# Patient Record
Sex: FEMALE | Race: WHITE | Employment: FULL TIME | URBAN - METROPOLITAN AREA
[De-identification: names, ages, dates, MRNs, and addresses within clinical notes are randomized per-mention and may not be internally consistent; named-entity substitution may affect disease eponyms.]

---

## 2017-02-21 ENCOUNTER — HOSPITAL ENCOUNTER (OUTPATIENT)
Age: 22
Discharge: HOME | End: 2017-02-21
Payer: COMMERCIAL

## 2017-02-21 VITALS
DIASTOLIC BLOOD PRESSURE: 77 MMHG | RESPIRATION RATE: 19 BRPM | HEIGHT: 66 IN | HEART RATE: 60 BPM | SYSTOLIC BLOOD PRESSURE: 125 MMHG | BODY MASS INDEX: 23.3 KG/M2 | OXYGEN SATURATION: 100 % | WEIGHT: 145 LBS | TEMPERATURE: 98 F

## 2017-02-21 DIAGNOSIS — B88.9 INFESTATION, MITES: ICD-10-CM

## 2017-02-21 DIAGNOSIS — B86 SCABIES: Primary | ICD-10-CM

## 2017-02-21 PROCEDURE — 99203 OFFICE O/P NEW LOW 30 MIN: CPT

## 2017-02-21 PROCEDURE — 99204 OFFICE O/P NEW MOD 45 MIN: CPT

## 2017-02-21 RX ORDER — PERMETHRIN 50 MG/100G
CREAM TOPICAL
Qty: 60 G | Refills: 0 | Status: SHIPPED | OUTPATIENT
Start: 2017-02-21

## 2017-02-22 NOTE — ED NOTES
PATIENT HAS MULTIPLE SCABBED OVER RED SORES ON BOTH ANKLE AREAS THAT SHE STATES STARTED ABOUT A MONTH AGO. SHE STATES SHE WAS SEEN BY HER DOCTOR WHO ORDERED HYDROCORTISONE CREAM TO THE AREAS. SHE STATES WHEN THEY START THEY [address] VERY BADLY.   SHE HAS SOME

## 2017-02-22 NOTE — ED INITIAL ASSESSMENT (HPI)
PATIENT IS HERE WITH RED SORES ON HER LOWER LEGS AND NOW STARTING ON HER ARMS.   SHE STATES IT STARTED ABOUT A MONTH AGO AND DID SEE HER MD.  HE   TOLD HER TO USE HYDROCORTISONE CREAM.

## 2017-02-22 NOTE — ED PROVIDER NOTES
Patient Seen in: [address]    History   Patient presents with:  Rash Skin Problem (integumentary)    Stated Complaint: rash     HPI    Is a 24-year-old female who presents to immediate care complaining of an itchy rash None (Room air)       Current:/77 mmHg  Pulse 60  Temp(Src) 97.7 °F (36.5 °C) (Oral)  Resp 19  Ht 167.6 cm (5' 6\")  Wt 65.772 kg  BMI 23.41 kg/m2  SpO2 100%  LMP 02/14/2017 (Approximate)        Physical Exam   Constitutional: She is oriented to pers

## 2019-09-25 ASSESSMENT — MIFFLIN-ST. JEOR: SCORE: 1377.71

## 2019-09-26 ENCOUNTER — SURGERY - HEALTHEAST (OUTPATIENT)
Dept: SURGERY | Facility: CLINIC | Age: 24
End: 2019-09-26

## 2019-09-26 ENCOUNTER — ANESTHESIA - HEALTHEAST (OUTPATIENT)
Dept: SURGERY | Facility: CLINIC | Age: 24
End: 2019-09-26

## 2019-09-27 ASSESSMENT — MIFFLIN-ST. JEOR: SCORE: 1364.11

## 2019-09-29 ENCOUNTER — COMMUNICATION - HEALTHEAST (OUTPATIENT)
Dept: FAMILY MEDICINE | Facility: CLINIC | Age: 24
End: 2019-09-29

## 2019-09-30 ENCOUNTER — RECORDS - HEALTHEAST (OUTPATIENT)
Dept: ADMINISTRATIVE | Facility: OTHER | Age: 24
End: 2019-09-30

## 2019-10-01 ENCOUNTER — OFFICE VISIT - HEALTHEAST (OUTPATIENT)
Dept: FAMILY MEDICINE | Facility: CLINIC | Age: 24
End: 2019-10-01

## 2019-10-01 ENCOUNTER — COMMUNICATION - HEALTHEAST (OUTPATIENT)
Dept: FAMILY MEDICINE | Facility: CLINIC | Age: 24
End: 2019-10-01

## 2019-10-01 DIAGNOSIS — R74.01 TRANSAMINITIS: ICD-10-CM

## 2019-10-01 DIAGNOSIS — R10.84 ABDOMINAL PAIN, GENERALIZED: ICD-10-CM

## 2019-10-01 DIAGNOSIS — Z23 NEED FOR INFLUENZA VACCINATION: ICD-10-CM

## 2019-10-01 ASSESSMENT — MIFFLIN-ST. JEOR: SCORE: 1427.61

## 2019-10-03 ENCOUNTER — COMMUNICATION - HEALTHEAST (OUTPATIENT)
Dept: HEALTH INFORMATION MANAGEMENT | Facility: CLINIC | Age: 24
End: 2019-10-03

## 2019-10-04 ENCOUNTER — COMMUNICATION - HEALTHEAST (OUTPATIENT)
Dept: ADMINISTRATIVE | Facility: CLINIC | Age: 24
End: 2019-10-04

## 2019-10-04 ENCOUNTER — COMMUNICATION - HEALTHEAST (OUTPATIENT)
Dept: FAMILY MEDICINE | Facility: CLINIC | Age: 24
End: 2019-10-04

## 2019-12-20 ENCOUNTER — COMMUNICATION - HEALTHEAST (OUTPATIENT)
Dept: SCHEDULING | Facility: CLINIC | Age: 24
End: 2019-12-20

## 2019-12-20 ENCOUNTER — COMMUNICATION - HEALTHEAST (OUTPATIENT)
Dept: FAMILY MEDICINE | Facility: CLINIC | Age: 24
End: 2019-12-20

## 2019-12-23 ENCOUNTER — RECORDS - HEALTHEAST (OUTPATIENT)
Dept: ADMINISTRATIVE | Facility: OTHER | Age: 24
End: 2019-12-23

## 2020-07-14 ENCOUNTER — COMMUNICATION - HEALTHEAST (OUTPATIENT)
Dept: FAMILY MEDICINE | Facility: CLINIC | Age: 25
End: 2020-07-14

## 2020-08-25 ENCOUNTER — OFFICE VISIT - HEALTHEAST (OUTPATIENT)
Dept: FAMILY MEDICINE | Facility: CLINIC | Age: 25
End: 2020-08-25

## 2020-08-25 DIAGNOSIS — F41.1 GENERALIZED ANXIETY DISORDER: ICD-10-CM

## 2020-08-25 DIAGNOSIS — R10.12 LEFT UPPER QUADRANT ABDOMINAL PAIN: ICD-10-CM

## 2020-08-25 DIAGNOSIS — Z00.00 ROUTINE MEDICAL EXAM: ICD-10-CM

## 2020-08-25 DIAGNOSIS — R74.01 TRANSAMINITIS: ICD-10-CM

## 2020-08-25 ASSESSMENT — ANXIETY QUESTIONNAIRES
7. FEELING AFRAID AS IF SOMETHING AWFUL MIGHT HAPPEN: MORE THAN HALF THE DAYS
IF YOU CHECKED OFF ANY PROBLEMS ON THIS QUESTIONNAIRE, HOW DIFFICULT HAVE THESE PROBLEMS MADE IT FOR YOU TO DO YOUR WORK, TAKE CARE OF THINGS AT HOME, OR GET ALONG WITH OTHER PEOPLE: VERY DIFFICULT
1. FEELING NERVOUS, ANXIOUS, OR ON EDGE: NEARLY EVERY DAY
GAD7 TOTAL SCORE: 19
5. BEING SO RESTLESS THAT IT IS HARD TO SIT STILL: NEARLY EVERY DAY
4. TROUBLE RELAXING: MORE THAN HALF THE DAYS
6. BECOMING EASILY ANNOYED OR IRRITABLE: NEARLY EVERY DAY
3. WORRYING TOO MUCH ABOUT DIFFERENT THINGS: NEARLY EVERY DAY
2. NOT BEING ABLE TO STOP OR CONTROL WORRYING: NEARLY EVERY DAY

## 2020-08-25 ASSESSMENT — PATIENT HEALTH QUESTIONNAIRE - PHQ9: SUM OF ALL RESPONSES TO PHQ QUESTIONS 1-9: 19

## 2020-08-25 ASSESSMENT — MIFFLIN-ST. JEOR: SCORE: 1438.72

## 2021-01-26 ENCOUNTER — COMMUNICATION - HEALTHEAST (OUTPATIENT)
Dept: FAMILY MEDICINE | Facility: CLINIC | Age: 26
End: 2021-01-26

## 2021-02-17 ENCOUNTER — COMMUNICATION - HEALTHEAST (OUTPATIENT)
Dept: FAMILY MEDICINE | Facility: CLINIC | Age: 26
End: 2021-02-17

## 2021-02-18 ENCOUNTER — OFFICE VISIT - HEALTHEAST (OUTPATIENT)
Dept: FAMILY MEDICINE | Facility: CLINIC | Age: 26
End: 2021-02-18

## 2021-02-18 DIAGNOSIS — R10.12 LEFT UPPER QUADRANT ABDOMINAL PAIN: ICD-10-CM

## 2021-02-18 DIAGNOSIS — F41.1 GENERALIZED ANXIETY DISORDER: ICD-10-CM

## 2021-02-18 DIAGNOSIS — R10.84 ABDOMINAL PAIN, GENERALIZED: ICD-10-CM

## 2021-02-18 RX ORDER — ONDANSETRON 4 MG/1
4 TABLET, ORALLY DISINTEGRATING ORAL EVERY 8 HOURS PRN
Qty: 30 TABLET | Refills: 2 | Status: SHIPPED | OUTPATIENT
Start: 2021-02-18 | End: 2023-06-17

## 2021-02-18 RX ORDER — SUMATRIPTAN 50 MG/1
TABLET, FILM COATED ORAL
Status: SHIPPED | COMMUNITY
Start: 2020-12-04 | End: 2023-06-17

## 2021-02-18 RX ORDER — MAGNESIUM OXIDE 400 MG/1
TABLET ORAL
Status: SHIPPED | COMMUNITY
Start: 2020-12-04 | End: 2023-06-17

## 2021-02-18 RX ORDER — AMITRIPTYLINE HYDROCHLORIDE 50 MG/1
50 TABLET ORAL
Qty: 90 TABLET | Refills: 3 | Status: SHIPPED | OUTPATIENT
Start: 2021-02-18 | End: 2023-06-17

## 2021-02-18 RX ORDER — BUPROPION HYDROCHLORIDE 100 MG/1
100 TABLET, EXTENDED RELEASE ORAL 2 TIMES DAILY
Qty: 60 TABLET | Refills: 2 | Status: SHIPPED | OUTPATIENT
Start: 2021-02-18 | End: 2023-06-17

## 2021-02-18 ASSESSMENT — MIFFLIN-ST. JEOR: SCORE: 1526.5

## 2021-02-18 ASSESSMENT — PATIENT HEALTH QUESTIONNAIRE - PHQ9: SUM OF ALL RESPONSES TO PHQ QUESTIONS 1-9: 18

## 2021-05-27 ASSESSMENT — PATIENT HEALTH QUESTIONNAIRE - PHQ9
SUM OF ALL RESPONSES TO PHQ QUESTIONS 1-9: 19
SUM OF ALL RESPONSES TO PHQ QUESTIONS 1-9: 18

## 2021-05-28 ASSESSMENT — ANXIETY QUESTIONNAIRES: GAD7 TOTAL SCORE: 19

## 2021-06-01 NOTE — ANESTHESIA PREPROCEDURE EVALUATION
Anesthesia Evaluation      No history of anesthetic complications     Airway   Mallampati: I  Neck ROM: full   Pulmonary - negative ROS    breath sounds clear to auscultation                         Cardiovascular   Exercise tolerance: > or = 4 METS  Rhythm: regular  Rate: normal,         Neuro/Psych    (+) anxiety/panic attacks,     Comments: Heavy EtOH intake (reportedly only for last month)      Endo/Other    (-) no obesity     GI/Hepatic/Renal      Comments:  admitted for biliary obstruction and jaundice with noted biliary duct dilatation on right upper quadrant ultrasound - s/f ERCP     Other findings: Results for ROXANE MARTINEZ (MRN [id number]) as of 9/26/2019 06:33    9/25/2019 10:58  Sodium: 137  Potassium: 3.9  Chloride: 95 (L)  CO2: 25  Anion Gap, Calculation: 17  BUN: 8  Creatinine: 0.65  GFR MDRD Af Amer: >60  GFR MDRD Non Af Amer: >60  Calcium: 10.0  AST: 846 (H)  ALT: 245 (H)  ALBUMIN: 3.5  Protein, Total: 7.0  Alkaline Phosphatase: 381 (H)  Bilirubin, Total: 10.9 (H)  Bilirubin, Direct: 6.3 (H)  Acetaminophen: <3.0 (L)  Alcohol, Blood: <10  Glucose: 83  Lactic Acid: 1.5  Lipase: 24  INR: 1.07  WBC: 5.0  RBC: 3.89  Hemoglobin: 14.0  Hematocrit: 38.9  MCV: 100  MCH: 36.0 (H)  MCHC: 36.0  RDW: 15.6 (H)  Platelets: 256  MPV: 11.9        Dental - normal exam                        Anesthesia Plan  Planned anesthetic: general endotracheal  GETA  Decadron 10, Zofran 4 for antiemesis   Versed PRN for s/sx of withdrawal   ASA 2   Induction: intravenous   Anesthetic plan and risks discussed with: patient  Anesthesia plan special considerations: antiemetics,   Post-op plan: routine recovery

## 2021-06-01 NOTE — ANESTHESIA POSTPROCEDURE EVALUATION
Patient: Tanika Chau  ENDOSCOPIC RETROGRADE CHOLANGIOPANCREATOGRAPHY AND PANCREATIC STENT PLACEMENT  Anesthesia type: general    Patient location: PACU  Last vitals:   Vitals Value Taken Time   /51 9/26/2019 12:59 PM   Temp 36.3  C (97.4  F) 9/26/2019 12:59 PM   Pulse 84 9/26/2019  1:31 PM   Resp 16 9/26/2019 12:59 PM   SpO2 97 % 9/26/2019 12:59 PM   Vitals shown include unvalidated device data.  Post vital signs: stable  Level of consciousness: awake and responds to simple questions  Post-anesthesia pain: pain controlled  Post-anesthesia nausea and vomiting: no  Pulmonary: unassisted, return to baseline  Cardiovascular: stable and blood pressure at baseline  Hydration: adequate  Anesthetic events: no    QCDR Measures:  ASA# 11 - Rachael-op Cardiac Arrest: ASA11B - Patient did NOT experience unanticipated cardiac arrest  ASA# 12 - Rachael-op Mortality Rate: ASA12B - Patient did NOT die  ASA# 13 - PACU Re-Intubation Rate: ASA13B - Patient did NOT require a new airway mgmt  ASA# 10 - Composite Anes Safety: ASA10A - No serious adverse event    Additional Notes:

## 2021-06-01 NOTE — ANESTHESIA CARE TRANSFER NOTE
Patient spontaneously breathing.  Tidal volumes > 300ml.  Following commands, suctioned and extubated with balloon down.  O2 via mask at 10L.  Monitors removed, significant redness noted where ekg patches had been on pts back. To PACU, VSS, SBAR report to RN per institutional handoff policy and procedure.  RN informed of ekg patch skin irritation.  Transfer of care.    Last vitals:   Vitals:    09/26/19 0843   BP: 113/83   Pulse: 80   Resp: 16   Temp: 36.6  C (97.8  F)   SpO2: 100%     Patient's level of consciousness is drowsy  Spontaneous respirations: yes  Maintains airway independently: yes  Dentition unchanged: yes  Oropharynx: oropharynx clear of all foreign objects    QCDR Measures:  ASA# 20 - Surgical Safety Checklist: WHO surgical safety checklist completed prior to induction    PQRS# 430 - Adult PONV Prevention: 4558F - Pt received => 2 anti-emetic agents (different classes) preop & intraop  ASA# 8 - Peds PONV Prevention: NA - Not pediatric patient, not GA or 2 or more risk factors NOT present  PQRS# 424 - Rachael-op Temp Management: 4559F - At least one body temp DOCUMENTED => 35.5C or 95.9F within required timeframe  PQRS# 426 - PACU Transfer Protocol: - Transfer of care checklist used  ASA# 14 - Acute Post-op Pain: ASA14B - Patient did NOT experience pain >= 7 out of 10

## 2021-06-02 NOTE — PROGRESS NOTES
ASSESSMENT:  1. Transaminitis    We will set her up with an appointment with gastroenterology to further ascertain was going on here.  She has seen them in the hospital and now needs a follow-up with them as she still is having issues we still do not really have a clear answer of what is going on.    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    She is also asking for a referral down to the [facility] to the gastroenterology program and if her insurance will cover it I am happy to help provide that assistance as well.  This is a strange situation without a clear answer in my mind at this point, so I think more opinions would be a good thing for this young lady who is pretty sick.    We will follow-up with her here as needed or to help coordinate her care going forward.  - Ambulatory referral to Gastroenterology    2. Abdominal pain, generalized    - Ambulatory referral to Gastroenterology    3. Need for influenza vaccination            PLAN:  There are no Patient Instructions on file for this visit.    Orders Placed This Encounter   Procedures     Ambulatory referral to Gastroenterology     Referral Priority:   Routine     Referral Type:   Consultation     Referral Reason:   Evaluation and Treatment     Requested Specialty:   Gastroenterology     Number of Visits Requested:   1     There are no discontinued medications.    No follow-ups on file.    CHIEF COMPLAINT:  Chief Complaint   Patient presents with     Follow-up     F/U D/C [facility] 09/27-Biliary obstruction - having constipation and was started on Miralax last night and no movement yet - Ate last night and threw up because she was so full       HISTORY OF PRESENT ILLNESS:  Tanika is a 23 y.o. female presenting to the clinic today as a new patient to our clinic to follow-up from a recent stay in the hospital.  She was seen for  jaundice and abdominal pain and significant increases in her ALT and AST as well as multiple other abnormalities in her labs.  She was seen and admitted both in August and then most recently in September from the 25th of the 27th.  We spent a good deal of time reviewing those labs and those admission notes and discharge notes from the hospital stay.  She has been seen by gastroenterology and has had some procedures done.  There was initially thought of biliary obstruction but there is no real evidence of that.  There was a pancreatic duct stent put in but it was more for preventative reasons.  There is not much on her imaging that we have seen other than fatty liver.  There is no evidence of stones.  There was some thought of alcoholic hepatitis from GIs notes, but she does not really drink all that much and had only had a few drinks before the first time she went into the hospital and really has not had any since then.  There is some testing done for copper excretion and metabolism but those do not seem to be 100% diagnostic.  She is concerned because she still is feeling lousy with a lot of abdominal pain.  She has not really been able to eat very much and she is clearly still icteric.  She like to have a referral to the [facility] and also get a referral to see GI as an outpatient to further elucidate what might be going on here.    As a new patient we reviewed her standard new patient questionnaires and went over her medical history which is been pretty unremarkable until now.    REVIEW OF SYSTEMS:     All other systems are negative.    PFSH:    Reviewed    Patient is new patient to the clinic. Please see past medical history, surgical history, social history and family history, all of which were completed in their entirety today.     TOBACCO USE:  Social History     Tobacco Use   Smoking Status Never Smoker   Smokeless Tobacco Never Used       VITALS:  Vitals:    10/01/19 1353   BP: (!) 86/58   Patient Site:  "Left Arm   Patient Position: Sitting   Cuff Size: Adult Regular   Pulse: 77   Temp: 98.4  F (36.9  C)   SpO2: 98%   Weight: 149 lb (67.6 kg)   Height: 5' 6\" (1.676 m)     Wt Readings from Last 3 Encounters:   10/01/19 149 lb (67.6 kg)   09/27/19 135 lb (61.2 kg)   09/25/19 138 lb 6 oz (62.8 kg)     Body mass index is 24.05 kg/m .    PHYSICAL EXAM:  Constitutional:  Well appearing patient in no acute distress.  Vitals:  Per nursing notes.    HEENT:  Normocephalic, atraumatic.  Ears are clear bilaterally, with no fluid or redness, and landmarks visible.  Pupils are equal and reactive to light, extraocular muscles intact, visual fields are full, but the conjunctiva are certainly yellowed I do not see any obvious Kayser-Fleischer rings..  Nose is normal, and oropharynx is clear without redness.    Neck is without lymphadenopathy.    Lungs:  Clear to auscultation bilaterally without wheezes, rales or rhonchi.   Cardiac:  Regular rate and rhythm without murmurs, rubs, or gallops.     Legs show no cyanosis, clubbing or edema.  Palpation of the distal pulses are normal and symmetric.    Neurologic:  Cranial nerves II-XII intact.   Normal and symmetric reflexes in extremities, with normal strength and sensation.  Psychiatric:  Mood appropriate, memory intact.       ADDITIONAL HISTORY SUMMARIZED (2): reviewed a good deal of notes from recent hospital stays  CARE EVERYWHERE/ EXTRA INFORMATION (1): None.   RADIOLOGY TESTS (1): reviewed   LABS (1): reviewed  CARDIOLOGY/MEDICINE TESTS (1):   INDEPENDENT REVIEW (2 each): None.     Total data points:4          MEDICATIONS:  Current Outpatient Medications   Medication Sig Dispense Refill     ibuprofen (ADVIL,MOTRIN) 400 MG tablet Take 1 tablet (400 mg total) by mouth every 6 (six) hours as needed. 30 tablet 0     multivitamin therapeutic tablet Take 1 tablet by mouth daily.       No current facility-administered medications for this visit.             "

## 2021-06-03 VITALS
OXYGEN SATURATION: 98 % | BODY MASS INDEX: 23.95 KG/M2 | SYSTOLIC BLOOD PRESSURE: 86 MMHG | HEIGHT: 66 IN | DIASTOLIC BLOOD PRESSURE: 58 MMHG | TEMPERATURE: 98.4 F | HEART RATE: 77 BPM | WEIGHT: 149 LBS

## 2021-06-03 VITALS — BODY MASS INDEX: 21.69 KG/M2 | WEIGHT: 135 LBS | HEIGHT: 66 IN

## 2021-06-04 VITALS
OXYGEN SATURATION: 98 % | DIASTOLIC BLOOD PRESSURE: 94 MMHG | HEIGHT: 66 IN | WEIGHT: 153.2 LBS | TEMPERATURE: 98.6 F | BODY MASS INDEX: 24.62 KG/M2 | HEART RATE: 94 BPM | SYSTOLIC BLOOD PRESSURE: 124 MMHG

## 2021-06-04 NOTE — TELEPHONE ENCOUNTER
"Patient calling, because she had sent a message to the MD regarding her \"white toes\".  She reports, I took a picture of my feet, and sent them to Dr. Stern, and he has not answered my email yet.   Could you check on that.  RN writer, looking in chart, found note from Dr. Stern, back to patient.    Yumiko Schroeder RN  [facility] Triage/refill nurse    "

## 2021-06-05 VITALS
SYSTOLIC BLOOD PRESSURE: 100 MMHG | OXYGEN SATURATION: 96 % | HEIGHT: 65 IN | DIASTOLIC BLOOD PRESSURE: 72 MMHG | WEIGHT: 174.3 LBS | BODY MASS INDEX: 29.04 KG/M2 | HEART RATE: 111 BPM

## 2021-06-15 NOTE — PROGRESS NOTES
"    Assessment & Plan     Left upper quadrant abdominal pain    The ondansetron that she had before did actually help some of the nausea that she gets so we will represcribed that for her.  This remains to be a very interesting difficult conundrum to figure out.  We reviewed the notes from [facility] at length today.  They seem to think it is a functional abdominal pain, which means to me that there is no obvious cause for it and they are attributing most of it to her anxiety.  The patient still seems to think that there is something going on so she like to see the OB/GYN which I did do a referral for down below.    We can follow-up with her and see how things go for her after all these referrals and with some of this medication that working to be doing.      - ondansetron (ZOFRAN ODT) 4 MG disintegrating tablet; Take 1 tablet (4 mg total) by mouth every 8 (eight) hours as needed.    Generalized anxiety disorder    I will try to prescribe that amitriptyline that the [facility] doctor recommended instead of the trazodone so we can see how well that works for her sleep and for anxiety.  We also did increase her bupropion up to twice a day and we can see how that goes for her as well.      - amitriptyline (ELAVIL) 50 MG tablet; Take 1 tablet (50 mg total) by mouth at bedtime as needed for pain.  - buPROPion (WELLBUTRIN SR) 100 MG 12 hr tablet; Take 1 tablet (100 mg total) by mouth 2 (two) times a day.    Abdominal pain, generalized    - Ambulatory referral to Obstetrics / Gynecology    Review of external notes as documented in note  30 minutes spent on the date of the encounter doing chart review, review of outside records, review of test results, interpretation of tests, patient visit and documentation        BMI:   Estimated body mass index is 29.01 kg/m  as calculated from the following:    Height as of this encounter: 5' 5\" (1.651 m).    Weight as of this encounter: 174 lb 4.8 oz (79.1 kg).       Depression " "Screening Follow Up    PHQ 2/18/2021   PHQ-9 Total Score 18   Q9: Thoughts of better off dead/self-harm past 2 weeks 0     PHQ-9 DEPRESSION SCALE 2/18/2021   Little interest or pleasure in doing things 3   Feeling down, depressed, or hopeless 2   Trouble falling or staying asleep, or sleeping too much 3   Feeling tired or having little energy 3   Poor appetite or overeating 3   Feeling bad about yourself - or that you are a failure or have let yourself or your family down 2   Trouble concentrating on things, such as reading the newspaper or watching television 2   Moving or speaking so slowly that other people could have noticed. Or the opposite - being so fidgety or restless that you have been moving around a lot more than usual 0   Thoughts that you would be better off dead, or of hurting yourself in some way 0   PHQ-9 Total Score 18   If you checked off any problems, how difficult have these problems made it for you to do your work, take care of things at home, or get along with other people? Extremely dIfficult   Some recent data might be hidden         Follow Up Actions Taken  Patient counseled, no additional follow up at this time.  Referred patient back to PCP   No follow-ups on file.    Tawanda Stern MD  [facility]   Tanika Chau is 25 y.o. and presents today for the following health issues   HPI     Patient here today for a follow-up visit.  She is been having this ongoing issue of abdominal discomfort and pain.  She went to [facility] this week for the stomach issues.  They did a bunch of tests including endoscopy, and ultrasound, even an MRI scan of her abdomen and nothing really was found at all.  The specialist down there called it a \"functional\" abdominal pain.  He thought that she needed counseling and is probably coming from her anxiety.  She is concerned that it possibly could be related to hormones that she like to see an OB/GYN and I can help her " "with that referral.  She knows that some of the depression anxiety that she has is causing her to have disrupted sleep and so there is a thought to possibly trying some amitriptyline but that might help a little with sleep as well as with anxiety and even pain.    Review of Systems        Objective    /72 (Patient Site: Left Arm, Patient Position: Sitting, Cuff Size: Adult Regular)   Pulse (!) 111   Ht 5' 5\" (1.651 m)   Wt 174 lb 4.8 oz (79.1 kg)   SpO2 96%   BMI 29.01 kg/m    Body mass index is 29.01 kg/m .  Physical Exam                "

## 2021-06-16 PROBLEM — K83.1 BILIARY OBSTRUCTION (H): Status: ACTIVE | Noted: 2019-09-25

## 2021-06-16 PROBLEM — F41.1 GENERALIZED ANXIETY DISORDER: Chronic | Status: ACTIVE | Noted: 2020-08-25

## 2021-06-19 NOTE — LETTER
Letter by Kartik Palencia at      Author: Kartik Palencia Service: -- Author Type: --    Filed:  Encounter Date: 10/3/2019 Status: Signed          October 3, 2019      Tanika Chau  [address] [facility] [address]      Dear Tanika Chau,    We have processed your request for proxy access to [email or web address]. If you did not make a request to ezekiel proxy access to an individual, please contact us immediately at [phone].    Through proxy access, your family member or other individual you approve, will be provided secure online access to information regarding your health. Through [employer], they will be able to review instructions from your health care provider, send a secure message to your provider, view test results, manage your appointments and more.    Again, thank you for registering for [employer]. Our team looks forward to partnering with you in managing your medical care and supporting healthy behaviors.     Thank you for choosing [employer].    Sincerely,    [facility]    If you have any further questions, please contact our [employer] Support Team by phone [phone] or email, [email or web address].

## 2021-06-27 ENCOUNTER — HEALTH MAINTENANCE LETTER (OUTPATIENT)
Age: 26
End: 2021-06-27

## 2021-06-29 NOTE — PROGRESS NOTES
Progress Notes by Tawanda Stern MD at 8/25/2020  1:00 PM     Author: Tawanda Stern MD Service: -- Author Type: Physician    Filed: 8/25/2020  2:55 PM Encounter Date: 8/25/2020 Status: Signed    : Tawanda Stern MD (Physician)       FEMALE PREVENTATIVE EXAM    Assessment and Plan:       1. Routine medical exam    Generally the patient is doing very well.  We discussed healthy lifestyles, including adequate exercise (3-4 times a week for 20-30 minutes), and a healthy diet.  Patient should return for annual physicals, and we can also see them here as needed.     We will have her come back in about 6 weeks to do the Pap since she is on her menses at this point.    2. Generalized anxiety disorder    Since she feels her anxiety is getting progressively worse and she is having a tough time just managing it through her weeks right now, she is hoping to start on some medication.  I will start her on some Lexapro 10 mg daily.  We discussed how this medication works and how it takes a bit of time for it to kick him.  We talked about potential side effects and other issues with the medications.    I did send her also in some alprazolam that she can use a couple of times a week as needed for severe panic attacks.  We talked about the controlled substance nature of this medication and that it is potentially addicting and something that I would not like to have her on daily long-term but it is more of a bridge until the other medication starts working better.    I like to see her again in about 6 weeks to make sure that she is doing well.      - escitalopram oxalate (LEXAPRO) 10 MG tablet; Take 1 tablet (10 mg total) by mouth daily.  Dispense: 30 tablet; Refill: 2  - ALPRAZolam (XANAX) 0.5 MG tablet; Take 1 tablet (0.5 mg total) by mouth 3 (three) times a day as needed for anxiety.  Dispense: 15 tablet; Refill: 0    3. Left upper quadrant abdominal pain    She will need to follow-up for GI from the recent ED visit.  She  also will need to follow-up with him on the ongoing transaminitis and that liver function tests elevations and chronic abdominal pain.  She also has a visit scheduled to go back down to the [facility] to finish up the work-up that was being started that was interrupted due to COVID.      - Ambulatory referral to Gastroenterology    4. Transaminitis          Next follow up:  No follow-ups on file.    Immunization Review  Adult Imm Review: No immunizations due today      I discussed the following with the patient:   Adult Healthy Living: Importance of regular exercise  Healthy nutrition  Getting adequate sleep  Stress management  Use of seat belts        Subjective:   Chief Complaint: Tanika Chau is an 24 y.o. female here for a preventative health visit.     HPI: Here today for a physical.  We will defer the Pap part of the physical today because she is on her menses.    Otherwise this is also serving as a hospital ED follow-up.  She went in with worsening left upper quadrant pain, which was different obviously than the chronic right upper quadrant pain that she has been having for a couple of years.  The pain started a few days before she went into the ED and got progressively worse.  She thought first it was something that she ate and when the pain became intolerable she went in.  They did quite a work-up on her including imaging of blood work and urine tests all of which were pretty much negative.  She has gotten slightly better from this still with no answer of what it actually was.  She was encouraged because the liver enzymes were elevated but not nearly as elevated as they were last year when we saw her and did some testing around that.  She is just eating a bland diet right now but has to schedule a follow-up with GI to see what they can do further.    She is very anxious at this point, and would like to start something for her anxiety.  She has been on sertraline before but that did not work well at  all for her.  She like to try something a little bit different.  She states that the anxiety has been very disabling especially during COVID, but she was actually anxious even before that.  Sometimes she just wants to stay in her house and avoid everybody and not get around any other anxiety provoking situations so this a bit of a agoraphobic element to this.  She is stating that it is getting worse and is really been difficult for her to do much of anything with work etc.  She does not really not depressed and has no suicidal or homicidal ideations and no delusionary or hallucinations.    She also was noted level work-up at the [facility] in regards to this chronic abdominal pain and elevated liver function tests when COVID had so she needs to call them and follow-up with them and complete the work-up that was started before COVID.    Healthy Habits  Are you taking a daily aspirin? No  Do you typically exercising at least 40 min, 3-4 times per week?  Yes  Do you usually eat at least 4 servings of fruit and vegetables a day, include whole grains and fiber and avoid regularly eating high fat foods? Yes  Have you had an eye exam in the past two years? NO  Do you see a dentist twice per year? NO  Do you have any concerns regarding sleep? YES, Trouble falling asleep and staying asleep        Review of Systems:  Please see above.  The rest of the review of systems are negative for all systems.     Pap History:   defer today due to menses  Cancer Screening       Status Date      PAP SMEAR Overdue 12/16/2014           Patient Care Team:  Provider, No Primary Care as PCP - General  Tawanda Stern MD as Assigned PCP        History     Reviewed By Date/Time Sections Reviewed    Tawanda Stern MD 8/25/2020  2:55 PM Medical, Surgical    Radha Shell CMA 8/25/2020  1:03 PM Tobacco            Objective:   Vital Signs:   Visit Vitals  BP (!) 124/94 (Patient Site: Left Arm, Patient Position: Sitting, Cuff Size: Adult Regular)  "  Pulse 94   Temp 98.6  F (37  C)   Ht 5' 5.5\" (1.664 m)   Wt 153 lb 3.2 oz (69.5 kg)   SpO2 98%   BMI 25.11 kg/m           PHYSICAL EXAM    Well developed, well nourished, no acute distress.  HEENT: normocephalic/atraumatic, PERRLA/EOMI, TMs: Gray, normal light reflex, no nasal discharge.  Oral mucosa: no erythema/exudate  Neck: No LAD/masses/thyromegaly/bruits  Lungs: clear bilaterally  Heart: regular rate and rhythm, no murmurs/gallops/rubs  Breasts: symmetric, no masses/skin changes, nipple discharge, or axillary LAD.  BSE reviewed.  Abdomen: Normal bowel sounds, soft, non-tender, non-distended, no masses, neg Mckeon's/McBurney's, no rebound/guarding  Lymphatics: no supraclavicular/axillary/epitrochlear/inguinal LAD. No edema.  Neuro: A&O x 3, CN II-XII intact, strength 5/5, reflexes symmetric, sensory intact to light touch.  Psych: Behavior appropriate, engaging.  Thought processes congruent, non-tangential.  Musculoskeletal: no gross deformities.  Skin: no rashes or lesions.            Medication List          Accurate as of August 25, 2020  2:55 PM. If you have any questions, ask your nurse or doctor.            START taking these medications    ALPRAZolam 0.5 MG tablet  Also known as:  XANAX  INSTRUCTIONS:  Take 1 tablet (0.5 mg total) by mouth 3 (three) times a day as needed for anxiety.  Started by:  Tawanda Stern MD        escitalopram oxalate 10 MG tablet  Also known as:  LEXAPRO  INSTRUCTIONS:  Take 1 tablet (10 mg total) by mouth daily.  Started by:  Tawanda Stern MD           CONTINUE taking these medications    ibuprofen 400 MG tablet  Also known as:  ADVIL,MOTRIN  INSTRUCTIONS:  Take 1 tablet (400 mg total) by mouth every 6 (six) hours as needed.        multivitamin therapeutic tablet  INSTRUCTIONS:  Take 1 tablet by mouth daily.        ondansetron 4 MG disintegrating tablet  Also known as:  Zofran ODT  INSTRUCTIONS:  Take 1 tablet (4 mg total) by mouth every 8 (eight) hours as needed.         "   STOP taking these medications    oxyCODONE 5 MG immediate release tablet  Also known as:  ROXICODONE  Stopped by:  Tawanda Stern MD           Where to Get Your Medications      These medications were sent to [id number] - [place], [place] - [address] AT [facility] [facility]  [address], [facility] [id number]    Hours:  24-hours Phone:  [phone]     ALPRAZolam 0.5 MG tablet    escitalopram oxalate 10 MG tablet         Additional Screenings Completed Today:

## 2021-10-17 ENCOUNTER — HEALTH MAINTENANCE LETTER (OUTPATIENT)
Age: 26
End: 2021-10-17

## 2021-11-13 ENCOUNTER — WALK IN (OUTPATIENT)
Dept: URGENT CARE | Age: 26
End: 2021-11-13

## 2021-11-13 VITALS
BODY MASS INDEX: 29.16 KG/M2 | TEMPERATURE: 98.2 F | HEIGHT: 65 IN | OXYGEN SATURATION: 98 % | RESPIRATION RATE: 16 BRPM | SYSTOLIC BLOOD PRESSURE: 120 MMHG | WEIGHT: 175 LBS | DIASTOLIC BLOOD PRESSURE: 65 MMHG | HEART RATE: 87 BPM

## 2021-11-13 DIAGNOSIS — H65.91 FLUID LEVEL BEHIND TYMPANIC MEMBRANE OF RIGHT EAR: ICD-10-CM

## 2021-11-13 DIAGNOSIS — R21 RASH AND NONSPECIFIC SKIN ERUPTION: ICD-10-CM

## 2021-11-13 DIAGNOSIS — J01.90 ACUTE SINUSITIS, RECURRENCE NOT SPECIFIED, UNSPECIFIED LOCATION: ICD-10-CM

## 2021-11-13 DIAGNOSIS — J02.0 STREP THROAT: Primary | ICD-10-CM

## 2021-11-13 LAB
INTERNAL PROCEDURAL CONTROLS ACCEPTABLE: YES
S PYO AG THROAT QL IA.RAPID: POSITIVE

## 2021-11-13 PROCEDURE — U0005 INFEC AGEN DETEC AMPLI PROBE: HCPCS | Performed by: PSYCHIATRY & NEUROLOGY

## 2021-11-13 PROCEDURE — 99203 OFFICE O/P NEW LOW 30 MIN: CPT | Performed by: NURSE PRACTITIONER

## 2021-11-13 PROCEDURE — 87880 STREP A ASSAY W/OPTIC: CPT | Performed by: NURSE PRACTITIONER

## 2021-11-13 PROCEDURE — U0003 INFECTIOUS AGENT DETECTION BY NUCLEIC ACID (DNA OR RNA); SEVERE ACUTE RESPIRATORY SYNDROME CORONAVIRUS 2 (SARS-COV-2) (CORONAVIRUS DISEASE [COVID-19]), AMPLIFIED PROBE TECHNIQUE, MAKING USE OF HIGH THROUGHPUT TECHNOLOGIES AS DESCRIBED BY CMS-2020-01-R: HCPCS | Performed by: PSYCHIATRY & NEUROLOGY

## 2021-11-13 RX ORDER — AMITRIPTYLINE HYDROCHLORIDE 50 MG/1
TABLET, FILM COATED ORAL
COMMUNITY

## 2021-11-13 RX ORDER — AMOXICILLIN 875 MG/1
875 TABLET, COATED ORAL 2 TIMES DAILY
Qty: 20 TABLET | Refills: 0 | Status: SHIPPED | OUTPATIENT
Start: 2021-11-13 | End: 2021-11-23

## 2021-11-13 RX ORDER — FLUTICASONE PROPIONATE 50 MCG
2 SPRAY, SUSPENSION (ML) NASAL DAILY
Qty: 16 G | Refills: 1 | COMMUNITY
Start: 2021-11-13 | End: 2021-11-20

## 2021-11-13 RX ORDER — CHLORAL HYDRATE 500 MG
CAPSULE ORAL
COMMUNITY

## 2021-11-13 ASSESSMENT — ENCOUNTER SYMPTOMS
DIAPHORESIS: 0
LIGHT-HEADEDNESS: 0
HEADACHES: 1
ABDOMINAL PAIN: 0
COUGH: 1
SLEEP DISTURBANCE: 0
CHEST TIGHTNESS: 0
DIZZINESS: 0
BACK PAIN: 0
SINUS PRESSURE: 1
VOICE CHANGE: 0
SHORTNESS OF BREATH: 0
ACTIVITY CHANGE: 0
SINUS PAIN: 1
BLOOD IN STOOL: 0
SORE THROAT: 1
ABDOMINAL DISTENTION: 0
DIARRHEA: 1
WHEEZING: 0
APPETITE CHANGE: 0
CHILLS: 0
VOMITING: 0
EYE REDNESS: 0
TROUBLE SWALLOWING: 0
FEVER: 0
NAUSEA: 1
RHINORRHEA: 1
WEAKNESS: 0

## 2021-11-14 ENCOUNTER — TELEPHONE (OUTPATIENT)
Dept: URGENT CARE | Age: 26
End: 2021-11-14

## 2021-11-14 LAB
SARS-COV-2 RNA RESP QL NAA+PROBE: NOT DETECTED
SERVICE CMNT-IMP: NORMAL
SERVICE CMNT-IMP: NORMAL

## 2021-11-16 ENCOUNTER — TELEPHONE (OUTPATIENT)
Dept: URGENT CARE | Age: 26
End: 2021-11-16

## 2022-06-21 ENCOUNTER — HOSPITAL ENCOUNTER (EMERGENCY)
Age: 27
Discharge: HOME | End: 2022-06-21
Attending: EMERGENCY MEDICINE

## 2022-06-21 ENCOUNTER — APPOINTMENT (OUTPATIENT)
Dept: CT IMAGING | Age: 27
End: 2022-06-21

## 2022-06-21 VITALS
HEART RATE: 75 BPM | TEMPERATURE: 98.1 F | RESPIRATION RATE: 18 BRPM | WEIGHT: 145.5 LBS | SYSTOLIC BLOOD PRESSURE: 113 MMHG | OXYGEN SATURATION: 98 % | DIASTOLIC BLOOD PRESSURE: 76 MMHG | BODY MASS INDEX: 24.24 KG/M2 | HEIGHT: 65 IN

## 2022-06-21 DIAGNOSIS — R19.7 VOMITING AND DIARRHEA: Primary | ICD-10-CM

## 2022-06-21 DIAGNOSIS — R11.10 VOMITING AND DIARRHEA: Primary | ICD-10-CM

## 2022-06-21 LAB
ALBUMIN SERPL-MCNC: 4.2 G/DL (ref 3.6–5.1)
ALBUMIN/GLOB SERPL: 1.1 {RATIO} (ref 1–2.4)
ALP SERPL-CCNC: 200 UNITS/L (ref 45–117)
ALT SERPL-CCNC: 81 UNITS/L
ANION GAP SERPL CALC-SCNC: 14 MMOL/L (ref 7–19)
AST SERPL-CCNC: 259 UNITS/L
BASOPHILS # BLD: 0 K/MCL (ref 0–0.3)
BASOPHILS NFR BLD: 1 %
BILIRUB SERPL-MCNC: 1 MG/DL (ref 0.2–1)
BUN SERPL-MCNC: 5 MG/DL (ref 6–20)
BUN/CREAT SERPL: 8 (ref 7–25)
CALCIUM SERPL-MCNC: 9.9 MG/DL (ref 8.4–10.2)
CHLORIDE SERPL-SCNC: 102 MMOL/L (ref 97–110)
CO2 SERPL-SCNC: 26 MMOL/L (ref 21–32)
CREAT SERPL-MCNC: 0.59 MG/DL (ref 0.51–0.95)
DEPRECATED RDW RBC: 55.5 FL (ref 39–50)
EOSINOPHIL # BLD: 0.1 K/MCL (ref 0–0.5)
EOSINOPHIL NFR BLD: 1 %
ERYTHROCYTE [DISTWIDTH] IN BLOOD: 13.4 % (ref 11–15)
ETHANOL SERPL-MCNC: NORMAL MG/DL
FASTING DURATION TIME PATIENT: ABNORMAL H
FLUAV RNA RESP QL NAA+PROBE: NOT DETECTED
FLUBV RNA RESP QL NAA+PROBE: NOT DETECTED
GFR SERPLBLD BASED ON 1.73 SQ M-ARVRAT: >90 ML/MIN
GLOBULIN SER-MCNC: 4 G/DL (ref 2–4)
GLUCOSE SERPL-MCNC: 95 MG/DL (ref 70–99)
HCT VFR BLD CALC: 40.8 % (ref 36–46.5)
HGB BLD-MCNC: 13.9 G/DL (ref 12–15.5)
IMM GRANULOCYTES # BLD AUTO: 0 K/MCL (ref 0–0.2)
IMM GRANULOCYTES # BLD: 0 %
LIPASE SERPL-CCNC: 67 UNITS/L (ref 73–393)
LYMPHOCYTES # BLD: 1 K/MCL (ref 1–4.8)
LYMPHOCYTES NFR BLD: 19 %
MCH RBC QN AUTO: 37.7 PG (ref 26–34)
MCHC RBC AUTO-ENTMCNC: 34.1 G/DL (ref 32–36.5)
MCV RBC AUTO: 110.6 FL (ref 78–100)
MONOCYTES # BLD: 0.3 K/MCL (ref 0.3–0.9)
MONOCYTES NFR BLD: 5 %
NEUTROPHILS # BLD: 3.9 K/MCL (ref 1.8–7.7)
NEUTROPHILS NFR BLD: 74 %
NRBC BLD MANUAL-RTO: 0 /100 WBC
PLATELET # BLD AUTO: 177 K/MCL (ref 140–450)
POTASSIUM SERPL-SCNC: 3.9 MMOL/L (ref 3.4–5.1)
PROT SERPL-MCNC: 8.2 G/DL (ref 6.4–8.2)
RAINBOW EXTRA TUBES HOLD SPECIMEN: NORMAL
RBC # BLD: 3.69 MIL/MCL (ref 4–5.2)
RSV AG NPH QL IA.RAPID: NOT DETECTED
SARS-COV-2 RNA RESP QL NAA+PROBE: NOT DETECTED
SERVICE CMNT-IMP: NORMAL
SERVICE CMNT-IMP: NORMAL
SODIUM SERPL-SCNC: 138 MMOL/L (ref 135–145)
WBC # BLD: 5.3 K/MCL (ref 4.2–11)

## 2022-06-21 PROCEDURE — 74177 CT ABD & PELVIS W/CONTRAST: CPT

## 2022-06-21 PROCEDURE — 96374 THER/PROPH/DIAG INJ IV PUSH: CPT

## 2022-06-21 PROCEDURE — C9803 HOPD COVID-19 SPEC COLLECT: HCPCS

## 2022-06-21 PROCEDURE — 96375 TX/PRO/DX INJ NEW DRUG ADDON: CPT

## 2022-06-21 PROCEDURE — 10002801 HB RX 250 W/O HCPCS: Performed by: PHYSICIAN ASSISTANT

## 2022-06-21 PROCEDURE — 96376 TX/PRO/DX INJ SAME DRUG ADON: CPT

## 2022-06-21 PROCEDURE — 82077 ASSAY SPEC XCP UR&BREATH IA: CPT | Performed by: PHYSICIAN ASSISTANT

## 2022-06-21 PROCEDURE — 80053 COMPREHEN METABOLIC PANEL: CPT | Performed by: EMERGENCY MEDICINE

## 2022-06-21 PROCEDURE — 0241U COVID/FLU/RSV PANEL: CPT | Performed by: PHYSICIAN ASSISTANT

## 2022-06-21 PROCEDURE — 99284 EMERGENCY DEPT VISIT MOD MDM: CPT

## 2022-06-21 PROCEDURE — 96361 HYDRATE IV INFUSION ADD-ON: CPT

## 2022-06-21 PROCEDURE — 83690 ASSAY OF LIPASE: CPT | Performed by: EMERGENCY MEDICINE

## 2022-06-21 PROCEDURE — G1004 CDSM NDSC: HCPCS

## 2022-06-21 PROCEDURE — 10002807 HB RX 258: Performed by: PHYSICIAN ASSISTANT

## 2022-06-21 PROCEDURE — 85025 COMPLETE CBC W/AUTO DIFF WBC: CPT | Performed by: EMERGENCY MEDICINE

## 2022-06-21 PROCEDURE — 10002805 HB CONTRAST AGENT: Performed by: PHYSICIAN ASSISTANT

## 2022-06-21 PROCEDURE — 10002800 HB RX 250 W HCPCS: Performed by: PHYSICIAN ASSISTANT

## 2022-06-21 RX ORDER — ONDANSETRON 2 MG/ML
4 INJECTION INTRAMUSCULAR; INTRAVENOUS ONCE
Status: COMPLETED | OUTPATIENT
Start: 2022-06-21 | End: 2022-06-21

## 2022-06-21 RX ORDER — FAMOTIDINE 10 MG/ML
20 INJECTION, SOLUTION INTRAVENOUS ONCE
Status: COMPLETED | OUTPATIENT
Start: 2022-06-21 | End: 2022-06-21

## 2022-06-21 RX ORDER — FAMOTIDINE 20 MG/1
20 TABLET, FILM COATED ORAL 2 TIMES DAILY
Qty: 28 TABLET | Refills: 0 | Status: SHIPPED | OUTPATIENT
Start: 2022-06-21

## 2022-06-21 RX ORDER — ONDANSETRON 4 MG/1
4 TABLET, ORALLY DISINTEGRATING ORAL EVERY 6 HOURS
Qty: 10 TABLET | Refills: 0 | Status: SHIPPED | OUTPATIENT
Start: 2022-06-21

## 2022-06-21 RX ADMIN — ONDANSETRON 4 MG: 2 INJECTION INTRAMUSCULAR; INTRAVENOUS at 10:52

## 2022-06-21 RX ADMIN — FENTANYL CITRATE 50 MCG: 50 INJECTION INTRAMUSCULAR; INTRAVENOUS at 13:15

## 2022-06-21 RX ADMIN — IOHEXOL 80 ML: 350 INJECTION, SOLUTION INTRAVENOUS at 14:32

## 2022-06-21 RX ADMIN — Medication 15 ML: at 10:54

## 2022-06-21 RX ADMIN — SODIUM CHLORIDE 1000 ML: 9 INJECTION, SOLUTION INTRAVENOUS at 10:30

## 2022-06-21 RX ADMIN — FAMOTIDINE 20 MG: 10 INJECTION INTRAVENOUS at 10:52

## 2022-06-21 RX ADMIN — ONDANSETRON 4 MG: 2 INJECTION INTRAMUSCULAR; INTRAVENOUS at 13:12

## 2022-06-21 ASSESSMENT — PAIN SCALES - GENERAL: PAINLEVEL_OUTOF10: 2

## 2022-10-01 ENCOUNTER — HEALTH MAINTENANCE LETTER (OUTPATIENT)
Age: 27
End: 2022-10-01

## 2023-02-05 ENCOUNTER — HEALTH MAINTENANCE LETTER (OUTPATIENT)
Age: 28
End: 2023-02-05

## 2023-02-16 ENCOUNTER — WALK IN (OUTPATIENT)
Dept: URGENT CARE | Age: 28
End: 2023-02-16
Attending: FAMILY MEDICINE

## 2023-02-16 VITALS
SYSTOLIC BLOOD PRESSURE: 146 MMHG | RESPIRATION RATE: 16 BRPM | TEMPERATURE: 97.6 F | OXYGEN SATURATION: 97 % | DIASTOLIC BLOOD PRESSURE: 103 MMHG | HEART RATE: 105 BPM

## 2023-02-16 DIAGNOSIS — R09.81 NASAL CONGESTION: ICD-10-CM

## 2023-02-16 DIAGNOSIS — H10.9 CONJUNCTIVITIS OF RIGHT EYE, UNSPECIFIED CONJUNCTIVITIS TYPE: ICD-10-CM

## 2023-02-16 DIAGNOSIS — J06.9 UPPER RESPIRATORY TRACT INFECTION, UNSPECIFIED TYPE: Primary | ICD-10-CM

## 2023-02-16 DIAGNOSIS — R11.0 NAUSEA: ICD-10-CM

## 2023-02-16 DIAGNOSIS — J02.9 SORE THROAT: ICD-10-CM

## 2023-02-16 LAB
S PYO DNA THROAT QL NAA+PROBE: NOT DETECTED
TEST LOT EXPIRATION DATE: NORMAL
TEST LOT NUMBER: NORMAL

## 2023-02-16 PROCEDURE — C9803 HOPD COVID-19 SPEC COLLECT: HCPCS

## 2023-02-16 PROCEDURE — 87651 STREP A DNA AMP PROBE: CPT | Performed by: FAMILY MEDICINE

## 2023-02-16 PROCEDURE — 0241U POCT COVID/FLU/RSV PANEL: CPT | Performed by: FAMILY MEDICINE

## 2023-02-16 PROCEDURE — G0463 HOSPITAL OUTPT CLINIC VISIT: HCPCS

## 2023-02-16 PROCEDURE — 99212 OFFICE O/P EST SF 10 MIN: CPT

## 2023-02-16 RX ORDER — ONDANSETRON 4 MG/1
4 TABLET, ORALLY DISINTEGRATING ORAL EVERY 8 HOURS PRN
Qty: 9 TABLET | Refills: 0 | Status: SHIPPED | OUTPATIENT
Start: 2023-02-16

## 2023-02-16 RX ORDER — POLYMYXIN B SULFATE AND TRIMETHOPRIM 1; 10000 MG/ML; [USP'U]/ML
1 SOLUTION OPHTHALMIC EVERY 6 HOURS
Qty: 10 ML | Refills: 0 | Status: SHIPPED | OUTPATIENT
Start: 2023-02-16

## 2023-02-16 ASSESSMENT — PAIN SCALES - GENERAL
PAINLEVEL_OUTOF10: 0
PAINLEVEL: 0

## 2023-02-17 ENCOUNTER — TELEPHONE (OUTPATIENT)
Dept: URGENT CARE | Age: 28
End: 2023-02-17

## 2023-02-17 LAB
FLUAV RNA RESP QL NAA+PROBE: NOT DETECTED
FLUBV RNA RESP QL NAA+PROBE: NOT DETECTED
RSV AG NPH QL IA.RAPID: NOT DETECTED
SARS-COV-2 RNA RESP QL NAA+PROBE: NOT DETECTED

## 2023-02-17 ASSESSMENT — ENCOUNTER SYMPTOMS
ADENOPATHY: 0
WEAKNESS: 0
EYE DISCHARGE: 1
FEVER: 0
COUGH: 0
SHORTNESS OF BREATH: 0
HEADACHES: 1
DIARRHEA: 1
VOMITING: 0
EYE REDNESS: 1
SINUS PRESSURE: 0
ABDOMINAL PAIN: 0
SORE THROAT: 1
DIZZINESS: 0
NAUSEA: 1
AGITATION: 0

## 2023-03-09 ENCOUNTER — WALK IN (OUTPATIENT)
Dept: URGENT CARE | Age: 28
End: 2023-03-09
Attending: FAMILY MEDICINE

## 2023-03-09 ENCOUNTER — HOSPITAL ENCOUNTER (OUTPATIENT)
Dept: ULTRASOUND IMAGING | Age: 28
Discharge: HOME | End: 2023-03-09
Attending: FAMILY MEDICINE

## 2023-03-09 VITALS
SYSTOLIC BLOOD PRESSURE: 128 MMHG | DIASTOLIC BLOOD PRESSURE: 88 MMHG | OXYGEN SATURATION: 98 % | TEMPERATURE: 98.4 F | RESPIRATION RATE: 16 BRPM | HEART RATE: 98 BPM

## 2023-03-09 DIAGNOSIS — Z76.0 ENCOUNTER FOR MEDICATION REFILL: ICD-10-CM

## 2023-03-09 DIAGNOSIS — T14.8XXA BRUISING: ICD-10-CM

## 2023-03-09 DIAGNOSIS — T14.8XXA BRUISING: Primary | ICD-10-CM

## 2023-03-09 PROCEDURE — 93971 EXTREMITY STUDY: CPT

## 2023-03-09 PROCEDURE — G0463 HOSPITAL OUTPT CLINIC VISIT: HCPCS

## 2023-03-09 PROCEDURE — 99212 OFFICE O/P EST SF 10 MIN: CPT

## 2023-03-09 RX ORDER — ONDANSETRON 4 MG/1
4 TABLET, ORALLY DISINTEGRATING ORAL EVERY 8 HOURS PRN
Qty: 9 TABLET | Refills: 0 | Status: SHIPPED | OUTPATIENT
Start: 2023-03-09

## 2023-03-09 ASSESSMENT — ENCOUNTER SYMPTOMS
VOMITING: 0
ADENOPATHY: 0
SHORTNESS OF BREATH: 0
AGITATION: 0
NAUSEA: 1
WEAKNESS: 0
COUGH: 0
ABDOMINAL PAIN: 0
FEVER: 0
NUMBNESS: 0

## 2023-03-09 ASSESSMENT — PAIN SCALES - GENERAL: PAINLEVEL_OUTOF10: 0

## 2023-03-31 ENCOUNTER — WALK IN (OUTPATIENT)
Dept: URGENT CARE | Age: 28
End: 2023-03-31
Attending: FAMILY MEDICINE

## 2023-03-31 VITALS
TEMPERATURE: 97.9 F | DIASTOLIC BLOOD PRESSURE: 84 MMHG | OXYGEN SATURATION: 95 % | HEART RATE: 89 BPM | SYSTOLIC BLOOD PRESSURE: 126 MMHG | RESPIRATION RATE: 16 BRPM

## 2023-03-31 DIAGNOSIS — R09.81 NASAL CONGESTION: ICD-10-CM

## 2023-03-31 DIAGNOSIS — J02.9 SORE THROAT: Primary | ICD-10-CM

## 2023-03-31 LAB
FLUAV RNA RESP QL NAA+PROBE: NOT DETECTED
FLUBV RNA RESP QL NAA+PROBE: NOT DETECTED
RSV AG NPH QL IA.RAPID: NOT DETECTED
S PYO DNA THROAT QL NAA+PROBE: NOT DETECTED
SARS-COV-2 RNA RESP QL NAA+PROBE: NOT DETECTED
TEST LOT EXPIRATION DATE: NORMAL
TEST LOT NUMBER: NORMAL

## 2023-03-31 PROCEDURE — 99212 OFFICE O/P EST SF 10 MIN: CPT

## 2023-03-31 PROCEDURE — 87651 STREP A DNA AMP PROBE: CPT | Performed by: FAMILY MEDICINE

## 2023-03-31 PROCEDURE — 0241U POCT COVID/FLU/RSV PANEL: CPT | Performed by: FAMILY MEDICINE

## 2023-03-31 PROCEDURE — G0463 HOSPITAL OUTPT CLINIC VISIT: HCPCS

## 2023-03-31 PROCEDURE — C9803 HOPD COVID-19 SPEC COLLECT: HCPCS

## 2023-03-31 ASSESSMENT — ENCOUNTER SYMPTOMS
NAUSEA: 0
ADENOPATHY: 0
AGITATION: 0
SHORTNESS OF BREATH: 0
EYE REDNESS: 0
FEVER: 0
HEADACHES: 0
DIARRHEA: 0
DIZZINESS: 0
VOMITING: 0
COUGH: 0
SINUS PRESSURE: 0
ABDOMINAL PAIN: 0
WEAKNESS: 0
SORE THROAT: 1

## 2023-03-31 ASSESSMENT — PAIN SCALES - GENERAL: PAINLEVEL_OUTOF10: 4

## 2023-06-17 ENCOUNTER — APPOINTMENT (OUTPATIENT)
Dept: CARDIOLOGY | Facility: CLINIC | Age: 28
DRG: 369 | End: 2023-06-17
Attending: NURSE PRACTITIONER
Payer: COMMERCIAL

## 2023-06-17 ENCOUNTER — HOSPITAL ENCOUNTER (INPATIENT)
Facility: CLINIC | Age: 28
LOS: 2 days | Discharge: HOME | DRG: 369 | End: 2023-06-19
Attending: EMERGENCY MEDICINE | Admitting: HOSPITALIST
Payer: COMMERCIAL

## 2023-06-17 ENCOUNTER — APPOINTMENT (OUTPATIENT)
Dept: ULTRASOUND IMAGING | Facility: CLINIC | Age: 28
DRG: 369 | End: 2023-06-17
Attending: NURSE PRACTITIONER
Payer: COMMERCIAL

## 2023-06-17 DIAGNOSIS — K92.0 HEMATEMESIS WITH NAUSEA: ICD-10-CM

## 2023-06-17 DIAGNOSIS — K70.30 ALCOHOLIC CIRRHOSIS, UNSPECIFIED WHETHER ASCITES PRESENT (H): ICD-10-CM

## 2023-06-17 DIAGNOSIS — D62 ANEMIA DUE TO BLOOD LOSS, ACUTE: ICD-10-CM

## 2023-06-17 LAB
ABO/RH(D): NORMAL
ALBUMIN SERPL BCG-MCNC: 4.4 G/DL (ref 3.5–5.2)
ALP SERPL-CCNC: 247 U/L (ref 35–104)
ALT SERPL W P-5'-P-CCNC: 32 U/L (ref 0–50)
ANION GAP SERPL CALCULATED.3IONS-SCNC: 18 MMOL/L (ref 7–15)
ANTIBODY SCREEN: NEGATIVE
AST SERPL W P-5'-P-CCNC: 120 U/L (ref 0–45)
BASOPHILS # BLD AUTO: 0.1 10E3/UL (ref 0–0.2)
BASOPHILS NFR BLD AUTO: 1 %
BILIRUB DIRECT SERPL-MCNC: 0.97 MG/DL (ref 0–0.3)
BILIRUB SERPL-MCNC: 2.4 MG/DL
BLD PROD TYP BPU: NORMAL
BLOOD COMPONENT TYPE: NORMAL
BUN SERPL-MCNC: 11.2 MG/DL (ref 6–20)
CALCIUM SERPL-MCNC: 10.6 MG/DL (ref 8.6–10)
CHLORIDE SERPL-SCNC: 101 MMOL/L (ref 98–107)
CODING SYSTEM: NORMAL
CREAT SERPL-MCNC: 0.38 MG/DL (ref 0.51–0.95)
CROSSMATCH: NORMAL
DEPRECATED HCO3 PLAS-SCNC: 21 MMOL/L (ref 22–29)
EOSINOPHIL # BLD AUTO: 0.3 10E3/UL (ref 0–0.7)
EOSINOPHIL NFR BLD AUTO: 4 %
ERYTHROCYTE [DISTWIDTH] IN BLOOD BY AUTOMATED COUNT: 13.6 % (ref 10–15)
ETHANOL SERPL-MCNC: <0.01 G/DL
GFR SERPL CREATININE-BSD FRML MDRD: >90 ML/MIN/1.73M2
GLUCOSE SERPL-MCNC: 118 MG/DL (ref 70–99)
HCG SERPL QL: NEGATIVE
HCT VFR BLD AUTO: 35.4 % (ref 35–47)
HGB BLD-MCNC: 11.4 G/DL (ref 11.7–15.7)
HGB BLD-MCNC: 9.1 G/DL (ref 11.7–15.7)
HGB BLD-MCNC: 9.6 G/DL (ref 11.7–15.7)
HGB BLD-MCNC: 9.7 G/DL (ref 11.7–15.7)
HGB BLD-MCNC: 9.9 G/DL (ref 11.7–15.7)
HOLD SPECIMEN: NORMAL
IMM GRANULOCYTES # BLD: 0 10E3/UL
IMM GRANULOCYTES NFR BLD: 0 %
INR PPP: 1.21 (ref 0.85–1.15)
LIPASE SERPL-CCNC: 36 U/L (ref 13–60)
LVEF ECHO: NORMAL
LYMPHOCYTES # BLD AUTO: 1.5 10E3/UL (ref 0.8–5.3)
LYMPHOCYTES NFR BLD AUTO: 22 %
MAGNESIUM SERPL-MCNC: 1.1 MG/DL (ref 1.7–2.3)
MCH RBC QN AUTO: 35.3 PG (ref 26.5–33)
MCHC RBC AUTO-ENTMCNC: 32.2 G/DL (ref 31.5–36.5)
MCV RBC AUTO: 110 FL (ref 78–100)
MONOCYTES # BLD AUTO: 0.5 10E3/UL (ref 0–1.3)
MONOCYTES NFR BLD AUTO: 8 %
NEUTROPHILS # BLD AUTO: 4.4 10E3/UL (ref 1.6–8.3)
NEUTROPHILS NFR BLD AUTO: 65 %
NRBC # BLD AUTO: 0 10E3/UL
NRBC BLD AUTO-RTO: 0 /100
PHOSPHATE SERPL-MCNC: 4 MG/DL (ref 2.5–4.5)
PLATELET # BLD AUTO: 147 10E3/UL (ref 150–450)
POTASSIUM SERPL-SCNC: 3.5 MMOL/L (ref 3.4–5.3)
PROT SERPL-MCNC: 8.1 G/DL (ref 6.4–8.3)
RBC # BLD AUTO: 3.23 10E6/UL (ref 3.8–5.2)
SODIUM SERPL-SCNC: 140 MMOL/L (ref 136–145)
SPECIMEN EXPIRATION DATE: NORMAL
UNIT ABO/RH: NORMAL
UNIT NUMBER: NORMAL
UNIT STATUS: NORMAL
UNIT TYPE ISBT: 5100
WBC # BLD AUTO: 6.7 10E3/UL (ref 4–11)

## 2023-06-17 PROCEDURE — 76700 US EXAM ABDOM COMPLETE: CPT

## 2023-06-17 PROCEDURE — 258N000003 HC RX IP 258 OP 636: Performed by: EMERGENCY MEDICINE

## 2023-06-17 PROCEDURE — 96375 TX/PRO/DX INJ NEW DRUG ADDON: CPT

## 2023-06-17 PROCEDURE — 250N000011 HC RX IP 250 OP 636: Performed by: NURSE PRACTITIONER

## 2023-06-17 PROCEDURE — 99418 PROLNG IP/OBS E/M EA 15 MIN: CPT | Performed by: NURSE PRACTITIONER

## 2023-06-17 PROCEDURE — 85610 PROTHROMBIN TIME: CPT | Performed by: EMERGENCY MEDICINE

## 2023-06-17 PROCEDURE — 84100 ASSAY OF PHOSPHORUS: CPT | Performed by: NURSE PRACTITIONER

## 2023-06-17 PROCEDURE — C9113 INJ PANTOPRAZOLE SODIUM, VIA: HCPCS | Performed by: NURSE PRACTITIONER

## 2023-06-17 PROCEDURE — 99285 EMERGENCY DEPT VISIT HI MDM: CPT | Mod: 25

## 2023-06-17 PROCEDURE — 99223 1ST HOSP IP/OBS HIGH 75: CPT | Performed by: NURSE PRACTITIONER

## 2023-06-17 PROCEDURE — 96374 THER/PROPH/DIAG INJ IV PUSH: CPT

## 2023-06-17 PROCEDURE — 86901 BLOOD TYPING SEROLOGIC RH(D): CPT | Performed by: EMERGENCY MEDICINE

## 2023-06-17 PROCEDURE — 250N000009 HC RX 250: Performed by: NURSE PRACTITIONER

## 2023-06-17 PROCEDURE — 86850 RBC ANTIBODY SCREEN: CPT | Performed by: EMERGENCY MEDICINE

## 2023-06-17 PROCEDURE — 93306 TTE W/DOPPLER COMPLETE: CPT | Mod: 26 | Performed by: INTERNAL MEDICINE

## 2023-06-17 PROCEDURE — 250N000013 HC RX MED GY IP 250 OP 250 PS 637: Performed by: NURSE PRACTITIONER

## 2023-06-17 PROCEDURE — 250N000011 HC RX IP 250 OP 636: Performed by: EMERGENCY MEDICINE

## 2023-06-17 PROCEDURE — 250N000011 HC RX IP 250 OP 636: Performed by: HOSPITALIST

## 2023-06-17 PROCEDURE — 99207 PR NO BILLABLE SERVICE THIS VISIT: CPT | Performed by: HOSPITALIST

## 2023-06-17 PROCEDURE — 84703 CHORIONIC GONADOTROPIN ASSAY: CPT | Performed by: EMERGENCY MEDICINE

## 2023-06-17 PROCEDURE — 85018 HEMOGLOBIN: CPT | Performed by: EMERGENCY MEDICINE

## 2023-06-17 PROCEDURE — C9113 INJ PANTOPRAZOLE SODIUM, VIA: HCPCS | Performed by: EMERGENCY MEDICINE

## 2023-06-17 PROCEDURE — 85018 HEMOGLOBIN: CPT | Performed by: NURSE PRACTITIONER

## 2023-06-17 PROCEDURE — 36415 COLL VENOUS BLD VENIPUNCTURE: CPT | Performed by: EMERGENCY MEDICINE

## 2023-06-17 PROCEDURE — 85025 COMPLETE CBC W/AUTO DIFF WBC: CPT | Performed by: EMERGENCY MEDICINE

## 2023-06-17 PROCEDURE — 83690 ASSAY OF LIPASE: CPT | Performed by: EMERGENCY MEDICINE

## 2023-06-17 PROCEDURE — 120N000001 HC R&B MED SURG/OB

## 2023-06-17 PROCEDURE — 258N000003 HC RX IP 258 OP 636: Performed by: NURSE PRACTITIONER

## 2023-06-17 PROCEDURE — 93306 TTE W/DOPPLER COMPLETE: CPT

## 2023-06-17 PROCEDURE — 36415 COLL VENOUS BLD VENIPUNCTURE: CPT | Performed by: NURSE PRACTITIONER

## 2023-06-17 PROCEDURE — 86923 COMPATIBILITY TEST ELECTRIC: CPT | Performed by: NURSE PRACTITIONER

## 2023-06-17 PROCEDURE — 83735 ASSAY OF MAGNESIUM: CPT | Performed by: NURSE PRACTITIONER

## 2023-06-17 PROCEDURE — 82248 BILIRUBIN DIRECT: CPT | Performed by: EMERGENCY MEDICINE

## 2023-06-17 PROCEDURE — 82077 ASSAY SPEC XCP UR&BREATH IA: CPT | Performed by: EMERGENCY MEDICINE

## 2023-06-17 PROCEDURE — 96361 HYDRATE IV INFUSION ADD-ON: CPT

## 2023-06-17 RX ORDER — ONDANSETRON 4 MG/1
4 TABLET, ORALLY DISINTEGRATING ORAL EVERY 6 HOURS PRN
Status: DISCONTINUED | OUTPATIENT
Start: 2023-06-17 | End: 2023-06-19 | Stop reason: HOSPADM

## 2023-06-17 RX ORDER — NALOXONE HYDROCHLORIDE 0.4 MG/ML
0.2 INJECTION, SOLUTION INTRAMUSCULAR; INTRAVENOUS; SUBCUTANEOUS
Status: DISCONTINUED | OUTPATIENT
Start: 2023-06-17 | End: 2023-06-19 | Stop reason: HOSPADM

## 2023-06-17 RX ORDER — OXYCODONE HYDROCHLORIDE 5 MG/1
5 TABLET ORAL EVERY 4 HOURS PRN
Status: DISCONTINUED | OUTPATIENT
Start: 2023-06-17 | End: 2023-06-19 | Stop reason: HOSPADM

## 2023-06-17 RX ORDER — ONDANSETRON 2 MG/ML
4 INJECTION INTRAMUSCULAR; INTRAVENOUS ONCE
Status: COMPLETED | OUTPATIENT
Start: 2023-06-17 | End: 2023-06-17

## 2023-06-17 RX ORDER — GABAPENTIN 300 MG/1
600 CAPSULE ORAL EVERY 8 HOURS
Status: DISCONTINUED | OUTPATIENT
Start: 2023-06-20 | End: 2023-06-19 | Stop reason: HOSPADM

## 2023-06-17 RX ORDER — AMITRIPTYLINE HYDROCHLORIDE 50 MG/1
50 TABLET ORAL
Status: DISCONTINUED | OUTPATIENT
Start: 2023-06-17 | End: 2023-06-17

## 2023-06-17 RX ORDER — DIAZEPAM 10 MG/2ML
5-10 INJECTION, SOLUTION INTRAMUSCULAR; INTRAVENOUS EVERY 30 MIN PRN
Status: DISCONTINUED | OUTPATIENT
Start: 2023-06-17 | End: 2023-06-19 | Stop reason: HOSPADM

## 2023-06-17 RX ORDER — OLANZAPINE 5 MG/1
5-10 TABLET, ORALLY DISINTEGRATING ORAL EVERY 6 HOURS PRN
Status: DISCONTINUED | OUTPATIENT
Start: 2023-06-17 | End: 2023-06-19 | Stop reason: HOSPADM

## 2023-06-17 RX ORDER — OCTREOTIDE ACETATE 50 UG/ML
50 INJECTION, SOLUTION INTRAVENOUS; SUBCUTANEOUS ONCE
Status: COMPLETED | OUTPATIENT
Start: 2023-06-17 | End: 2023-06-17

## 2023-06-17 RX ORDER — NALOXONE HYDROCHLORIDE 0.4 MG/ML
0.4 INJECTION, SOLUTION INTRAMUSCULAR; INTRAVENOUS; SUBCUTANEOUS
Status: DISCONTINUED | OUTPATIENT
Start: 2023-06-17 | End: 2023-06-19 | Stop reason: HOSPADM

## 2023-06-17 RX ORDER — HYDROMORPHONE HCL IN WATER/PF 6 MG/30 ML
0.2 PATIENT CONTROLLED ANALGESIA SYRINGE INTRAVENOUS EVERY 4 HOURS PRN
Status: DISCONTINUED | OUTPATIENT
Start: 2023-06-17 | End: 2023-06-19 | Stop reason: HOSPADM

## 2023-06-17 RX ORDER — HALOPERIDOL 5 MG/ML
2.5-5 INJECTION INTRAMUSCULAR EVERY 6 HOURS PRN
Status: DISCONTINUED | OUTPATIENT
Start: 2023-06-17 | End: 2023-06-19 | Stop reason: HOSPADM

## 2023-06-17 RX ORDER — GABAPENTIN 100 MG/1
300 CAPSULE ORAL EVERY 8 HOURS
Status: DISCONTINUED | OUTPATIENT
Start: 2023-06-22 | End: 2023-06-19 | Stop reason: HOSPADM

## 2023-06-17 RX ORDER — BUPROPION HYDROCHLORIDE 100 MG/1
100 TABLET, EXTENDED RELEASE ORAL 2 TIMES DAILY
Status: DISCONTINUED | OUTPATIENT
Start: 2023-06-17 | End: 2023-06-17

## 2023-06-17 RX ORDER — PROCHLORPERAZINE 25 MG
25 SUPPOSITORY, RECTAL RECTAL EVERY 12 HOURS PRN
Status: DISCONTINUED | OUTPATIENT
Start: 2023-06-17 | End: 2023-06-19 | Stop reason: HOSPADM

## 2023-06-17 RX ORDER — DIAZEPAM 10 MG
10 TABLET ORAL EVERY 30 MIN PRN
Status: DISCONTINUED | OUTPATIENT
Start: 2023-06-17 | End: 2023-06-19 | Stop reason: HOSPADM

## 2023-06-17 RX ORDER — MORPHINE SULFATE 4 MG/ML
4 INJECTION, SOLUTION INTRAMUSCULAR; INTRAVENOUS ONCE
Status: COMPLETED | OUTPATIENT
Start: 2023-06-17 | End: 2023-06-17

## 2023-06-17 RX ORDER — GABAPENTIN 600 MG/1
1200 TABLET ORAL ONCE
Status: COMPLETED | OUTPATIENT
Start: 2023-06-17 | End: 2023-06-17

## 2023-06-17 RX ORDER — PROCHLORPERAZINE MALEATE 5 MG
10 TABLET ORAL EVERY 6 HOURS PRN
Status: DISCONTINUED | OUTPATIENT
Start: 2023-06-17 | End: 2023-06-19 | Stop reason: HOSPADM

## 2023-06-17 RX ORDER — SODIUM CHLORIDE 9 MG/ML
INJECTION, SOLUTION INTRAVENOUS CONTINUOUS
Status: DISCONTINUED | OUTPATIENT
Start: 2023-06-17 | End: 2023-06-18

## 2023-06-17 RX ORDER — MORPHINE SULFATE 2 MG/ML
2 INJECTION, SOLUTION INTRAMUSCULAR; INTRAVENOUS EVERY 4 HOURS PRN
Status: DISCONTINUED | OUTPATIENT
Start: 2023-06-17 | End: 2023-06-19 | Stop reason: HOSPADM

## 2023-06-17 RX ORDER — MAGNESIUM SULFATE HEPTAHYDRATE 40 MG/ML
4 INJECTION, SOLUTION INTRAVENOUS ONCE
Status: COMPLETED | OUTPATIENT
Start: 2023-06-17 | End: 2023-06-18

## 2023-06-17 RX ORDER — ONDANSETRON 2 MG/ML
4 INJECTION INTRAMUSCULAR; INTRAVENOUS EVERY 6 HOURS PRN
Status: DISCONTINUED | OUTPATIENT
Start: 2023-06-17 | End: 2023-06-19 | Stop reason: HOSPADM

## 2023-06-17 RX ORDER — FLUMAZENIL 0.1 MG/ML
0.2 INJECTION, SOLUTION INTRAVENOUS
Status: DISCONTINUED | OUTPATIENT
Start: 2023-06-17 | End: 2023-06-19 | Stop reason: HOSPADM

## 2023-06-17 RX ORDER — LIDOCAINE 40 MG/G
CREAM TOPICAL
Status: DISCONTINUED | OUTPATIENT
Start: 2023-06-17 | End: 2023-06-19 | Stop reason: HOSPADM

## 2023-06-17 RX ORDER — GABAPENTIN 100 MG/1
100 CAPSULE ORAL EVERY 8 HOURS
Status: DISCONTINUED | OUTPATIENT
Start: 2023-06-24 | End: 2023-06-19 | Stop reason: HOSPADM

## 2023-06-17 RX ORDER — CLONIDINE HYDROCHLORIDE 0.1 MG/1
0.1 TABLET ORAL EVERY 8 HOURS
Status: DISCONTINUED | OUTPATIENT
Start: 2023-06-17 | End: 2023-06-18

## 2023-06-17 RX ORDER — GABAPENTIN 300 MG/1
900 CAPSULE ORAL EVERY 8 HOURS
Status: DISCONTINUED | OUTPATIENT
Start: 2023-06-17 | End: 2023-06-19 | Stop reason: HOSPADM

## 2023-06-17 RX ADMIN — OCTREOTIDE ACETATE 50 MCG: 50 INJECTION, SOLUTION INTRAVENOUS; SUBCUTANEOUS at 13:53

## 2023-06-17 RX ADMIN — ONDANSETRON 4 MG: 2 INJECTION INTRAMUSCULAR; INTRAVENOUS at 04:11

## 2023-06-17 RX ADMIN — MORPHINE SULFATE 4 MG: 4 INJECTION, SOLUTION INTRAMUSCULAR; INTRAVENOUS at 05:40

## 2023-06-17 RX ADMIN — SODIUM CHLORIDE 8 MG/HR: 9 INJECTION, SOLUTION INTRAVENOUS at 14:03

## 2023-06-17 RX ADMIN — MAGNESIUM SULFATE HEPTAHYDRATE 4 G: 40 INJECTION, SOLUTION INTRAVENOUS at 22:57

## 2023-06-17 RX ADMIN — SODIUM CHLORIDE: 9 INJECTION, SOLUTION INTRAVENOUS at 13:51

## 2023-06-17 RX ADMIN — SODIUM CHLORIDE 8 MG/HR: 9 INJECTION, SOLUTION INTRAVENOUS at 22:54

## 2023-06-17 RX ADMIN — MORPHINE SULFATE 2 MG: 2 INJECTION, SOLUTION INTRAMUSCULAR; INTRAVENOUS at 14:58

## 2023-06-17 RX ADMIN — FOLIC ACID: 5 INJECTION, SOLUTION INTRAMUSCULAR; INTRAVENOUS; SUBCUTANEOUS at 13:18

## 2023-06-17 RX ADMIN — ONDANSETRON 4 MG: 4 TABLET, ORALLY DISINTEGRATING ORAL at 21:24

## 2023-06-17 RX ADMIN — SODIUM CHLORIDE: 9 INJECTION, SOLUTION INTRAVENOUS at 22:16

## 2023-06-17 RX ADMIN — OXYCODONE HYDROCHLORIDE 5 MG: 5 TABLET ORAL at 17:35

## 2023-06-17 RX ADMIN — SODIUM CHLORIDE 1000 ML: 9 INJECTION, SOLUTION INTRAVENOUS at 04:11

## 2023-06-17 RX ADMIN — MORPHINE SULFATE 2 MG: 2 INJECTION, SOLUTION INTRAMUSCULAR; INTRAVENOUS at 21:24

## 2023-06-17 RX ADMIN — PANTOPRAZOLE SODIUM 40 MG: 40 INJECTION, POWDER, FOR SOLUTION INTRAVENOUS at 04:18

## 2023-06-17 ASSESSMENT — ACTIVITIES OF DAILY LIVING (ADL)
ADLS_ACUITY_SCORE: 35
ADLS_ACUITY_SCORE: 33
ADLS_ACUITY_SCORE: 20
DIFFICULTY_EATING/SWALLOWING: NO
ADLS_ACUITY_SCORE: 33
DRESSING/BATHING_DIFFICULTY: NO
HEARING_DIFFICULTY_OR_DEAF: NO
WALKING_OR_CLIMBING_STAIRS_DIFFICULTY: NO
ADLS_ACUITY_SCORE: 35
ADLS_ACUITY_SCORE: 35
ADLS_ACUITY_SCORE: 20
FALL_HISTORY_WITHIN_LAST_SIX_MONTHS: NO
CONCENTRATING,_REMEMBERING_OR_MAKING_DECISIONS_DIFFICULTY: NO
DOING_ERRANDS_INDEPENDENTLY_DIFFICULTY: NO
ADLS_ACUITY_SCORE: 37
DIFFICULTY_COMMUNICATING: NO
CHANGE_IN_FUNCTIONAL_STATUS_SINCE_ONSET_OF_CURRENT_ILLNESS/INJURY: NO
WEAR_GLASSES_OR_BLIND: NO
ADLS_ACUITY_SCORE: 35
TOILETING_ISSUES: NO
ADLS_ACUITY_SCORE: 35

## 2023-06-17 NOTE — ED TRIAGE NOTES
"Pt to ER with c/o abd pain pt with n/v  Pt states vomiting red blood, pt in [place] to go to [place] for \"liver issues\"      "

## 2023-06-17 NOTE — CONSULTS
[facility] Consultation     Tanika Chau  [address]  [facility] [address]  27 year old female     Admission Date/Time: 6/17/2023  Primary Care Provider: Rosanna Ref-Primary, Physician  Referring / Attending Physician:  [place]     We were asked to see the patient in consultation by Dr. Callahan for evaluation of hematemesis.    ASSESSMENT:    Acute etoh hepatitis  UGIB, likely Rupali-Garland tear    RECOMMENDATIONS:  PPI  Stop octreotide  Etoh withdrawal precautions  Diet as tolerated  Follow CBC, stool output - if she develops recurrent melena/hematemesis, EGD would be appropriate.     Thank you for involving us in this patient's care.  Please call me with any questions.    Total time spent in chart review, direct medical discussion, examination, and documentation was 30 minutes    Malachi Multani DO   [facility]  Cell [phone]    ________________________________________________________________________        CC: hematemesis     HPI:  Tanika Chau is a 27 year old female who we are asked to see for hematemesis after binge drinking 5 white claws around 3am.  She recalls awakening, feeling nauseated, diaphoretic, vomited - coffee ground like material.  Then additional emesis x 2 with red blood, followed by mild chest pressure.  Found to have liver chemistries consistent with etoh hepatitis.  Had a small, pellet-like stool earlier today, brown, no melena/hematochezia.     EGD 2019 revealed gastric tic, but otherwise normal.      ROS: A comprehensive ten point review of systems was negative aside from those in mentioned in the HPI.      PAST MEDICAL HISTORY:  Patient Active Problem List    Diagnosis Date Noted     Anemia due to blood loss, acute 06/17/2023     Priority: Medium     Hematemesis with nausea 06/17/2023     Priority: Medium     Alcoholic cirrhosis, unspecified whether ascites present (H) 06/17/2023     Priority: Medium     Generalized anxiety disorder 08/25/2020      Priority: Medium     Transaminitis      Priority: Medium     Biliary obstruction 09/25/2019     Priority: Medium     Steatohepatitis      Priority: Medium     Elevated liver function tests      Priority: Medium     High anion gap metabolic acidosis      Priority: Medium     SOCIAL HISTORY:  Social History     Tobacco Use     Smoking status: Never     Smokeless tobacco: Never   Substance Use Topics     Alcohol use: Yes     Drug use: Never     FAMILY HISTORY:  No family history on file.  ALLERGIES:   Allergies   Allergen Reactions     Acetaminophen Unknown     impaired liver function, Other reaction(s): Other (see comments), impaired liver function     Dilaudid [Hydromorphone] Nausea     MEDICATIONS:   Current Facility-Administered Medications   Medication     0.9% sodium chloride BOLUS     cloNIDine (CATAPRES) tablet 0.1 mg     diazepam (VALIUM) tablet 10 mg    Or     diazepam (VALIUM) injection 5-10 mg     flumazenil (ROMAZICON) injection 0.2 mg     [START ON 6/24/2023] gabapentin (NEURONTIN) capsule 100 mg     [START ON 6/22/2023] gabapentin (NEURONTIN) capsule 300 mg     [START ON 6/20/2023] gabapentin (NEURONTIN) capsule 600 mg     gabapentin (NEURONTIN) capsule 900 mg     gabapentin (NEURONTIN) tablet 1,200 mg     OLANZapine zydis (zyPREXA) ODT tab 5-10 mg    Or     haloperidol lactate (HALDOL) injection 2.5-5 mg     HYDROmorphone (DILAUDID) injection 0.2 mg     lidocaine (LMX4) cream     lidocaine 1 % 0.1-1 mL     melatonin tablet 1 mg     melatonin tablet 5 mg     morphine (PF) injection 2 mg     naloxone (NARCAN) injection 0.2 mg    Or     naloxone (NARCAN) injection 0.4 mg    Or     naloxone (NARCAN) injection 0.2 mg    Or     naloxone (NARCAN) injection 0.4 mg     octreotide (sandoSTATIN) 1,250 mcg in D5W 250 mL     ondansetron (ZOFRAN ODT) ODT tab 4 mg    Or     ondansetron (ZOFRAN) injection 4 mg     oxyCODONE (ROXICODONE) tablet 5 mg     pantoprazole (PROTONIX) 80 mg in sodium chloride 0.9 % 100 mL  infusion     prochlorperazine (COMPAZINE) injection 10 mg    Or     prochlorperazine (COMPAZINE) tablet 10 mg    Or     prochlorperazine (COMPAZINE) suppository 25 mg     sodium chloride (PF) 0.9% PF flush 3 mL     sodium chloride (PF) 0.9% PF flush 3 mL     sodium chloride 0.9 % 1,000 mL with Infuvite Adult 10 mL, thiamine 100 mg, folic acid 1 mg infusion     sodium chloride 0.9% infusion     PHYSICAL EXAM:   /75   Pulse 74   Temp 98.9  F (37.2  C) (Oral)   Resp 16   Wt 60.8 kg (134 lb 0.6 oz)   SpO2 99%   BMI 22.31 kg/m     GEN: Alert, oriented x3, communicative and in NAD.  LUH: AT, anicteric, OP without erythema, exudate, or ulcers.    NECK: Supple.    LYMPH: No LAD noted.  HRT: RRR  LUNGS: CTA  ABD: ND, +BS, no guarding or pain to palpation, no rebound, no HSM.  SKIN: No rash, jaundice or spider angiomata  MSKL: LE free of edema, strength 5/5 all 4 extrems  NEURO: CN grossly intact, sensation intact to light touch, toes downgoing.     ADDITIONAL DATA:   I reviewed the patient's new clinical lab test results.   Recent Labs   Lab Test 06/17/23  1411 06/17/23  0604 06/17/23  0401 09/07/20  1343 08/26/20  2150 08/17/20  0128 09/25/19  1058   WBC  --   --  6.7 6.6 6.5   < > 5.0   HGB 9.7* 9.9* 11.4* 14.3 14.5   < > 14.0   MCV  --   --  110* 105* 104*   < > 100   PLT  --   --  147* 338 265   < > 256   INR  --   --  1.21*  --   --   --  1.07    < > = values in this interval not displayed.     Recent Labs   Lab Test 06/17/23  0401 09/07/20  1344 08/26/20  2150   POTASSIUM 3.5 3.9 4.1   CHLORIDE 101 103 97*   CO2 21* 20* 21*   BUN 11.2 7* 13   ANIONGAP 18* 16 18     Recent Labs   Lab Test 06/17/23  0401 09/07/20  1344 08/26/20  2150 08/17/20  0128 08/17/20  0106 08/14/19  0612 08/13/19  1405   ALBUMIN 4.4 4.6 4.8   < >  --    < >  --    BILITOTAL 2.4* 1.0 1.2*   < >  --    < >  --    ALT 32 43 79*   < >  --    < >  --    * 60* 180*   < >  --    < >  --    PROTEIN  --   --   --   --  Negative  --  30  mg/dL*   LIPASE 36 <9 <9   < >  --    < >  --     < > = values in this interval not displayed.        I reviewed the patient's new imaging results.

## 2023-06-17 NOTE — ED PROVIDER NOTES
"  History     Chief Complaint:  Nausea & Vomiting and Hematemesis     HPI   Tanika Chau is a 27 year old female with a history of alcohol abuse and cirrhosis of the liver who presents with nausea, vomiting, and abdominal pain. Around 0300, 2 hours ago, the patient started vomiting blood. This has never happened to her before. She notes that the first time she vomited, it was dark brown in color. Since then, her vomit has been bright red. She describes her abdominal pain as a \"burning pain\". She has also had diarrhea. She had an endoscopy done at [facility] in 2019. She drank alcohol last night. She denies a history of pancreatitis.     Independent Historian:   None - Patient Only    Review of External Notes:   I reviewed the Endoscopy from 2019. No varices identified.    I reviewed the ER note from 6/2022 from [facility].     Medications:    amitriptyline (ELAVIL) 50 MG tablet  BIOTIN ORAL  buPROPion (WELLBUTRIN SR) 100 MG 12 hr tablet  cholecalciferol, vitamin D3, (VITAMIN D3 ORAL)  docosahexaenoic acid/epa (FISH OIL ORAL)  magnesium oxide (MAG-OX) 400 mg (241.3 mg magnesium) tablet  multivitamin therapeutic tablet  ondansetron (ZOFRAN ODT) 4 MG disintegrating tablet  SUMAtriptan (IMITREX) 50 MG tablet      Past Medical History:    Past Medical History:   Diagnosis Date     Alcohol abuse      Alcoholic cirrhosis of liver      Anxiety      Gastric diverticulum        Past Surgical History:    Past Surgical History:   Procedure Laterality Date     ENDOSCOPIC RETROGRADE CHOLANGIOPANCREATOGRAM N/A 09/26/2019    Procedure: ENDOSCOPIC RETROGRADE CHOLANGIOPANCREATOGRAPHY AND PANCREATIC STENT PLACEMENT;  Surgeon: Mak Golden MD;  [facility]: [facility];  Service: Gastroenterology     TONSILLECTOMY, ADENOIDECTOMY, COMBINED          Physical Exam     Patient Vitals for the past 24 hrs:   BP Temp Temp src Pulse Resp SpO2   06/17/23 0352 138/88 98.7  F (37.1  C) Temporal 105 20 98 %     Physical " Exam  Nursing note and vitals reviewed.  Constitutional: Cooperative. Uncomfortable appearing, sitting in chair in triage  HENT:   Mouth/Throat: Mucous membranes are normal.   Cardiovascular: Normal rate, regular rhythm and normal heart sounds.  No murmur.  Pulmonary/Chest: Effort normal and breath sounds normal. No respiratory distress. No wheezes. No rales.   Abdominal: Soft. Normal appearance. No distension. There is no rigidity and no guarding. Epigastric abdominal tenderness.   Neurological: Alert. Oriented x4  Skin: Skin is warm and dry.  Psychiatric: Normal mood and affect.      Emergency Department Course     Laboratory:  Labs Ordered and Resulted from Time of ED Arrival to Time of ED Departure   BASIC METABOLIC PANEL - Abnormal       Result Value    Sodium 140      Potassium 3.5      Chloride 101      Carbon Dioxide (CO2) 21 (*)     Anion Gap 18 (*)     Urea Nitrogen 11.2      Creatinine 0.38 (*)     Calcium 10.6 (*)     Glucose 118 (*)     GFR Estimate >90     HEPATIC FUNCTION PANEL - Abnormal    Protein Total 8.1      Albumin 4.4      Bilirubin Total 2.4 (*)     Alkaline Phosphatase 247 (*)      (*)     ALT 32      Bilirubin Direct 0.97 (*)    CBC WITH PLATELETS AND DIFFERENTIAL - Abnormal    WBC Count 6.7      RBC Count 3.23 (*)     Hemoglobin 11.4 (*)     Hematocrit 35.4       (*)     MCH 35.3 (*)     MCHC 32.2      RDW 13.6      Platelet Count 147 (*)     % Neutrophils 65      % Lymphocytes 22      % Monocytes 8      % Eosinophils 4      % Basophils 1      % Immature Granulocytes 0      NRBCs per 100 WBC 0      Absolute Neutrophils 4.4      Absolute Lymphocytes 1.5      Absolute Monocytes 0.5      Absolute Eosinophils 0.3      Absolute Basophils 0.1      Absolute Immature Granulocytes 0.0      Absolute NRBCs 0.0     INR - Abnormal    INR 1.21 (*)    HEMOGLOBIN - Abnormal, delta 2-hour level    Hemoglobin 9.9 (*)    LIPASE - Normal    Lipase 36     HCG QUALITATIVE PREGNANCY - Normal    hCG  Serum Qualitative Negative     ETHYL ALCOHOL LEVEL - Normal    Alcohol ethyl <0.01        Interventions:  Medications   0.9% sodium chloride BOLUS (1000 mLs Intravenous Stopped 23 0642)   ondansetron (ZOFRAN) injection 4 mg (4 mg Intravenous $Given 23 0411)   pantoprazole (PROTONIX) IV push injection 40 mg (40 mg Intravenous $Given 23 0418)   morphine (PF) injection 4 mg (4 mg Intravenous $Given 23 0540)      Assessments:  0400 I obtained history and examined the patient.   06 I rechecked the patient and explained findings.  Pain improved.  Discussed admission which patient agrees to    Independent Interpretation (X-rays, CTs, rhythm strip):  None    Consultations/Discussion of Management or Tests:  None       Social Determinants of Health affecting care:   Ongoing alcohol use complicating medical care    Disposition:  The patient was admitted to the hospital under the care of Dr. Callahan.     Impression & Plan      Medical Decision Makin-year-old female with alcoholic liver disease who presents with hematemesis.  She is hemodynamically stable but does show a drop in her hemoglobin from a baseline of 13-11.4 and with 2-hour recheck this dropped to 9.9.  Her most recent endoscopy from 2019 does not show varices though this is certainly possible.  Given her age and epigastric pain I would suspect likely gastric ulcer versus alcoholic gastritis.  She will be admitted for hemoglobin monitoring and consideration of upper endoscopy.  PPI administered.  We will hold on octreotide at this time given absence of varices though this may be reconsidered if she decompensates      Diagnosis:    ICD-10-CM    1. Hematemesis with nausea  K92.0       2. Anemia due to blood loss, acute  D62       3. Alcoholic cirrhosis, unspecified whether ascites present (H)  K70.30             Scribe Disclosure:  Wen CHRISTIANSEN, am serving as a scribe at 5:56 AM on 2023 to document services personally  performed by Charli Ma MD based on my observations and the provider's statements to me.   6/17/2023   Charli Ma MD Amdahl, John, MD  06/17/23 0751

## 2023-06-17 NOTE — H&P
[facility]    History and Physical - Hospitalist Service       Date of Admission:  6/17/2023    Assessment & Plan      Tanika Chau is a 27 year old female with PMH significant for alcohol abuse, cirrhosis, gastric diverticulum admitted on 6/17/2023 for upper GIB.    ED work up significant for Hgb drop from 11.4 to 9.9 in 2 hours.  Platelets are 147, INR mildly elevated at 1.21.  AST is 120, Bilirubin 2.4, and Alk Phos 247.  Her Blood alcohol level was negative.  Exam notable for mild epigastric tenderness and burning sensation and a systolic cardiac murmur.          #Upper GI Bleed  #Alcohol abuse  #Alcoholic Cirrhosis of the Liver  #Gastric Diverticulum  Last EGD in 2019 through [facility] significant for gastric diverticulum, but no varices.  She lives in [place] but follows through [facility] for her liver work up.  She is actually in [place] for follow up which was unfortunately cancelled due to insurance reasons.  Hemoglobin dropped from 11.4 to 9.9 in 2 hours in the ED concerning for ruptured varices vs bleeding gastric ulcer.  Her complaints of burning sensation would favor ulcer and reassuringly, she has not vomited since receiving Zofran in the ED.   -ASAP GI consult ordered  -Stat Type and Screen  -Strict NPO  -IVF @ 100ml/hr ordered  -Continue to trend Hgb Q4 hours or sooner if vitals decompensate  -Conditional unit of PRBC and Platelets ordered  -Protonix IV  -Will consider octreotide if she decompensates  -CIWA protocol ordered  -Potassium and Mag replacement ordered  -Abdominal duplex pending to evaluate cirrhosis.  -SW ordered for chemical dependency      #Systolic Murmur  She does not recall being told she has a murmur in the past.  Possible due to fluid volume deficit.  -Echocardiogram ordered.     #Depression  -Not currently on medication                     Diet:   NPO  DVT Prophylaxis: Pneumatic Compression Devices  Vegas Catheter: Not present  Lines: None     Cardiac  Monitoring: None  Code Status:   Full    Clinically Significant Risk Factors Present on Admission           # Hypercalcemia: Highest Ca = 10.6 mg/dL in last 2 days, will monitor as appropriate     # Coagulation Defect: INR = 1.21 (Ref range: 0.85 - 1.15) and/or PTT = N/A, will monitor for bleeding                  Disposition Plan      Expected Discharge Date: 06/19/2023                The patient's care was discussed with the Attending Physician, Dr. Callahan and Patient.    DIONICIO Miranda [facility]  Hospitalist Service  [facility]  Securely message with [employer] (more info)  Text page via [facility] Paging/Directory     ______________________________________________________________________    Chief Complaint   Upper GIB    History is obtained from the patient    History of Present Illness   Tanika Chau is a 27 year old female with PMH significant for alcohol abuse, cirrhosis, gastric diverticulum who lives in [place].  She previously followed through [place] for her liver work-up and last EGD was in 2019 showing gastric diverticulum but no esophageal varices.  She is actually in [place] currently staying with a friend as she was supposed to have follow-up at [facility] that was unfortunately canceled due to insurance issues.  She reports last night she had 5 white claws, woke up in the middle of the night and initially vomited dark brown-colored emesis.  She had 3 subsequent emesis that were bright red blood.  She also reports a few weeks ago after breaking up with her boyfriend that she was drinking quite heavily.  At that time, she did have some symptoms consistent with mild withdrawal including nausea, tremor, and headache.  At present she does not feel like she is having any current symptoms.  She describes epigastric pain that feels like a burning sensation.  Other than that she denies any other pain.  She does note that she has been bruising more easily lately.    During my exam I noted a  systolic cardiac murmur and she does not recall that she is ever been told she has a murmur in the past.      Past Medical History    Past Medical History:   Diagnosis Date     Alcohol abuse      Alcoholic cirrhosis of liver      Anxiety      Gastric diverticulum     Noted on EGD 2019       Past Surgical History   Past Surgical History:   Procedure Laterality Date     ENDOSCOPIC RETROGRADE CHOLANGIOPANCREATOGRAM N/A 09/26/2019    Procedure: ENDOSCOPIC RETROGRADE CHOLANGIOPANCREATOGRAPHY AND PANCREATIC STENT PLACEMENT;  Surgeon: Mak Golden MD;  Location: [facility];  Service: Gastroenterology     TONSILLECTOMY, ADENOIDECTOMY, COMBINED         Prior to Admission Medications   Prior to Admission Medications   Prescriptions Last Dose Informant Patient Reported? Taking?   BIOTIN ORAL   Yes No   Sig: [BIOTIN ORAL] Take by mouth.   SUMAtriptan (IMITREX) 50 MG tablet   Yes No   Sig: [SUMATRIPTAN (IMITREX) 50 MG TABLET]    amitriptyline (ELAVIL) 50 MG tablet   No No   Sig: [AMITRIPTYLINE (ELAVIL) 50 MG TABLET] Take 1 tablet (50 mg total) by mouth at bedtime as needed for pain.   buPROPion (WELLBUTRIN SR) 100 MG 12 hr tablet   No No   Sig: [BUPROPION (WELLBUTRIN SR) 100 MG 12 HR TABLET] Take 1 tablet (100 mg total) by mouth 2 (two) times a day.   cholecalciferol, vitamin D3, (VITAMIN D3 ORAL)   Yes No   Sig: [CHOLECALCIFEROL, VITAMIN D3, (VITAMIN D3 ORAL)] Take by mouth.   docosahexaenoic acid/epa (FISH OIL ORAL)   Yes No   Sig: [DOCOSAHEXAENOIC ACID/EPA (FISH OIL ORAL)] Take by mouth.   magnesium oxide (MAG-OX) 400 mg (241.3 mg magnesium) tablet   Yes No   Sig: [MAGNESIUM OXIDE (MAG-OX) 400 MG (241.3 MG MAGNESIUM) TABLET] TK 1 T PO NIGHTLY   multivitamin therapeutic tablet   Yes No   Sig: [MULTIVITAMIN THERAPEUTIC TABLET] Take 1 tablet by mouth daily.   ondansetron (ZOFRAN ODT) 4 MG disintegrating tablet   No No   Sig: [ONDANSETRON (ZOFRAN ODT) 4 MG DISINTEGRATING TABLET] Take 1 tablet (4 mg total) by mouth  every 8 (eight) hours as needed.      Facility-Administered Medications: None           Physical Exam   Vital Signs: Temp: 98.7  F (37.1  C) Temp src: Temporal BP: 111/71 Pulse: 82   Resp: 12 SpO2: 100 % O2 Device: None (Room air)    Weight: 0 lbs 0 oz    GENERAL: No apparent distress.  HEENT: Patient is normocephalic, atraumatic.  EYES: Anicteric without injection.  RESPIRATORY: Clear to auscultation bilaterally.  CARDIOVASCULAR: Regular rate and rhythm.  Normal S1, S2.  Systolic Murmur Present  GASTROINTESTINAL: The abdomen was normal in contour. Bowel sounds were present. Soft, without distension, mild epigastric tenderness  EXTREMITIES: Warm & well perfused. No edema  NEUROLOGIC: The patient was alert and conversation was appropriate. Grossly non-focal.  PSYCHIATRIC: Mood and affect congruent.  SKIN: Exposed skin is within normal limits.       Medical Decision Making       90 MINUTES SPENT BY ME on the date of service doing chart review, history, exam, documentation & further activities per the note.      Data     I have personally reviewed the following data over the past 24 hrs:    6.7  \   9.9 (L)   / 147 (L)     140 101 11.2 /  118 (H)   3.5 21 (L) 0.38 (L) \       ALT: 32 AST: 120 (H) AP: 247 (H) TBILI: 2.4 (H)   ALB: 4.4 TOT PROTEIN: 8.1 LIPASE: 36       INR:  1.21 (H) PTT:  N/A   D-dimer:  N/A Fibrinogen:  N/A

## 2023-06-17 NOTE — PHARMACY-ADMISSION MEDICATION HISTORY
Pharmacist Admission Medication History    Admission medication history is complete. The information provided in this note is only as accurate as the sources available at the time of the update.    Medication reconciliation/reorder completed by provider prior to medication history? Yes    Information Source(s): Patient via in-person    Pertinent Information: pt states has not take amitriptyline or bupropion since 2021.     Changes made to PTA medication list:    Added: None    Deleted: amitriptylin e, bupropion, Vit D3, fish oil, magox, zofran, and Imitrex    Changed: None    Medication Affordability:       Allergies reviewed with patient and updates made in EHR: yes    Medication History Completed By: Lynda Martinez RPH 6/17/2023 9:59 AM    Prior to Admission medications    Medication Sig Last Dose Taking? Auth Provider Long Term End Date   multivitamin therapeutic tablet [MULTIVITAMIN THERAPEUTIC TABLET] Take 1 tablet by mouth daily. Past Week Yes Provider, Historical

## 2023-06-17 NOTE — ED NOTES
"[facility]  ED Nurse Handoff Report    ED Chief complaint: Nausea & Vomiting and Hematemesis  . ED Diagnosis:   Final diagnoses:   Hematemesis with nausea   Anemia due to blood loss, acute   Alcoholic cirrhosis, unspecified whether ascites present (H)       Allergies:   Allergies   Allergen Reactions     Acetaminophen Unknown     impaired liver function, Other reaction(s): Other (see comments), impaired liver function     Dilaudid [Hydromorphone] Nausea       Code Status: Full Code    Activity level - Baseline/Home:  independent.  Activity Level - Current:   assist of 1.   Lift room needed: No.   Bariatric: No   Needed: No   Isolation: No.   Infection: Not Applicable.     Respiratory status: Room air    Vital Signs (within 30 minutes):   Vitals:    06/17/23 0352   BP: 138/88   Pulse: 105   Resp: 20   Temp: 98.7  F (37.1  C)   TempSrc: Temporal   SpO2: 98%       Cardiac Rhythm:  ,      Pain level:    Patient confused: No.   Patient Falls Risk: nonskid shoes/slippers when out of bed.   Elimination Status: Has voided     Patient Report - Initial Complaint: Pt to ER with c/o vomiting blood.   Focused Assessment: Pt to ER with mult episodes ov vomiting blood, pt in triage with bright red blood in emesis bag, pt also here in [place] having a work up done at [place] for \"liver Issues\"     Abnormal Results:   Labs Ordered and Resulted from Time of ED Arrival to Time of ED Departure   BASIC METABOLIC PANEL - Abnormal       Result Value    Sodium 140      Potassium 3.5      Chloride 101      Carbon Dioxide (CO2) 21 (*)     Anion Gap 18 (*)     Urea Nitrogen 11.2      Creatinine 0.38 (*)     Calcium 10.6 (*)     Glucose 118 (*)     GFR Estimate >90     HEPATIC FUNCTION PANEL - Abnormal    Protein Total 8.1      Albumin 4.4      Bilirubin Total 2.4 (*)     Alkaline Phosphatase 247 (*)      (*)     ALT 32      Bilirubin Direct 0.97 (*)    CBC WITH PLATELETS AND DIFFERENTIAL - Abnormal    WBC " Count 6.7      RBC Count 3.23 (*)     Hemoglobin 11.4 (*)     Hematocrit 35.4       (*)     MCH 35.3 (*)     MCHC 32.2      RDW 13.6      Platelet Count 147 (*)     % Neutrophils 65      % Lymphocytes 22      % Monocytes 8      % Eosinophils 4      % Basophils 1      % Immature Granulocytes 0      NRBCs per 100 WBC 0      Absolute Neutrophils 4.4      Absolute Lymphocytes 1.5      Absolute Monocytes 0.5      Absolute Eosinophils 0.3      Absolute Basophils 0.1      Absolute Immature Granulocytes 0.0      Absolute NRBCs 0.0     INR - Abnormal    INR 1.21 (*)    HEMOGLOBIN - Abnormal    Hemoglobin 9.9 (*)    LIPASE - Normal    Lipase 36     HCG QUALITATIVE PREGNANCY - Normal    hCG Serum Qualitative Negative     ETHYL ALCOHOL LEVEL - Normal    Alcohol ethyl <0.01          No orders to display       Treatments provided: Meds and fluids  Family Comments: Pt here by herself  OBS brochure/video discussed/provided to patient:  Yes  ED Medications:   Medications   0.9% sodium chloride BOLUS (1,000 mLs Intravenous $New Bag 6/17/23 0411)   ondansetron (ZOFRAN) injection 4 mg (4 mg Intravenous $Given 6/17/23 0291)   pantoprazole (PROTONIX) IV push injection 40 mg (40 mg Intravenous $Given 6/17/23 9812)   morphine (PF) injection 4 mg (4 mg Intravenous $Given 6/17/23 7329)       Drips infusing:  No  For the majority of the shift this patient was Green.   Interventions performed werN/A.    Sepsis treatment initiated: No    Cares/treatment/interventions/medications to be completed following ED care:See [facility]    ED Nurse Name: Мария Hernandez RN  6:38 AM     RECEIVING UNIT ED HANDOFF REVIEW    Above ED Nurse Handoff Report was reviewed: Yes  Reviewed by: Angel Turner RN on June 17, 2023 at 12:14 PM

## 2023-06-18 LAB
ALBUMIN SERPL BCG-MCNC: 3.8 G/DL (ref 3.5–5.2)
ALBUMIN UR-MCNC: NEGATIVE MG/DL
ALP SERPL-CCNC: 169 U/L (ref 35–104)
ALT SERPL W P-5'-P-CCNC: 27 U/L (ref 0–50)
ANION GAP SERPL CALCULATED.3IONS-SCNC: 10 MMOL/L (ref 7–15)
APPEARANCE UR: CLEAR
AST SERPL W P-5'-P-CCNC: 107 U/L (ref 0–45)
BACTERIA #/AREA URNS HPF: ABNORMAL /HPF
BILIRUB SERPL-MCNC: 1.8 MG/DL
BILIRUB UR QL STRIP: NEGATIVE
BUN SERPL-MCNC: 8.5 MG/DL (ref 6–20)
CALCIUM SERPL-MCNC: 8 MG/DL (ref 8.6–10)
CHLORIDE SERPL-SCNC: 106 MMOL/L (ref 98–107)
COLOR UR AUTO: YELLOW
CREAT SERPL-MCNC: 0.47 MG/DL (ref 0.51–0.95)
DEPRECATED HCO3 PLAS-SCNC: 25 MMOL/L (ref 22–29)
ERYTHROCYTE [DISTWIDTH] IN BLOOD BY AUTOMATED COUNT: 14 % (ref 10–15)
GFR SERPL CREATININE-BSD FRML MDRD: >90 ML/MIN/1.73M2
GLUCOSE SERPL-MCNC: 88 MG/DL (ref 70–99)
GLUCOSE UR STRIP-MCNC: NEGATIVE MG/DL
HCT VFR BLD AUTO: 29.3 % (ref 35–47)
HGB BLD-MCNC: 8.8 G/DL (ref 11.7–15.7)
HGB BLD-MCNC: 9.6 G/DL (ref 11.7–15.7)
HGB BLD-MCNC: 9.6 G/DL (ref 11.7–15.7)
HGB UR QL STRIP: NEGATIVE
INR PPP: 1.29 (ref 0.85–1.15)
KETONES UR STRIP-MCNC: NEGATIVE MG/DL
LEUKOCYTE ESTERASE UR QL STRIP: NEGATIVE
MAGNESIUM SERPL-MCNC: 1.8 MG/DL (ref 1.7–2.3)
MCH RBC QN AUTO: 36.2 PG (ref 26.5–33)
MCHC RBC AUTO-ENTMCNC: 32.8 G/DL (ref 31.5–36.5)
MCV RBC AUTO: 111 FL (ref 78–100)
NITRATE UR QL: NEGATIVE
PH UR STRIP: 6.5 [PH] (ref 5–7)
PLATELET # BLD AUTO: 113 10E3/UL (ref 150–450)
POTASSIUM SERPL-SCNC: 3.9 MMOL/L (ref 3.4–5.3)
PROT SERPL-MCNC: 6.7 G/DL (ref 6.4–8.3)
RBC # BLD AUTO: 2.65 10E6/UL (ref 3.8–5.2)
RBC URINE: <1 /HPF
SODIUM SERPL-SCNC: 141 MMOL/L (ref 136–145)
SP GR UR STRIP: 1.01 (ref 1–1.03)
SQUAMOUS EPITHELIAL: 7 /HPF
UROBILINOGEN UR STRIP-MCNC: NORMAL MG/DL
WBC # BLD AUTO: 4.1 10E3/UL (ref 4–11)
WBC URINE: 1 /HPF

## 2023-06-18 PROCEDURE — 250N000011 HC RX IP 250 OP 636: Performed by: NURSE PRACTITIONER

## 2023-06-18 PROCEDURE — 120N000001 HC R&B MED SURG/OB

## 2023-06-18 PROCEDURE — 85027 COMPLETE CBC AUTOMATED: CPT | Performed by: NURSE PRACTITIONER

## 2023-06-18 PROCEDURE — 250N000013 HC RX MED GY IP 250 OP 250 PS 637: Performed by: NURSE PRACTITIONER

## 2023-06-18 PROCEDURE — 250N000009 HC RX 250: Performed by: NURSE PRACTITIONER

## 2023-06-18 PROCEDURE — 80053 COMPREHEN METABOLIC PANEL: CPT | Performed by: NURSE PRACTITIONER

## 2023-06-18 PROCEDURE — 81001 URINALYSIS AUTO W/SCOPE: CPT | Performed by: HOSPITALIST

## 2023-06-18 PROCEDURE — 83735 ASSAY OF MAGNESIUM: CPT | Performed by: HOSPITALIST

## 2023-06-18 PROCEDURE — 85018 HEMOGLOBIN: CPT | Performed by: NURSE PRACTITIONER

## 2023-06-18 PROCEDURE — 250N000011 HC RX IP 250 OP 636: Performed by: HOSPITALIST

## 2023-06-18 PROCEDURE — 36415 COLL VENOUS BLD VENIPUNCTURE: CPT | Performed by: NURSE PRACTITIONER

## 2023-06-18 PROCEDURE — 85610 PROTHROMBIN TIME: CPT | Performed by: NURSE PRACTITIONER

## 2023-06-18 PROCEDURE — 258N000003 HC RX IP 258 OP 636: Performed by: NURSE PRACTITIONER

## 2023-06-18 PROCEDURE — 99233 SBSQ HOSP IP/OBS HIGH 50: CPT | Performed by: HOSPITALIST

## 2023-06-18 PROCEDURE — C9113 INJ PANTOPRAZOLE SODIUM, VIA: HCPCS | Performed by: NURSE PRACTITIONER

## 2023-06-18 RX ADMIN — MORPHINE SULFATE 2 MG: 2 INJECTION, SOLUTION INTRAMUSCULAR; INTRAVENOUS at 01:22

## 2023-06-18 RX ADMIN — SODIUM CHLORIDE: 9 INJECTION, SOLUTION INTRAVENOUS at 08:20

## 2023-06-18 RX ADMIN — MORPHINE SULFATE 2 MG: 2 INJECTION, SOLUTION INTRAMUSCULAR; INTRAVENOUS at 14:06

## 2023-06-18 RX ADMIN — MORPHINE SULFATE 2 MG: 2 INJECTION, SOLUTION INTRAMUSCULAR; INTRAVENOUS at 23:20

## 2023-06-18 RX ADMIN — SODIUM CHLORIDE 8 MG/HR: 9 INJECTION, SOLUTION INTRAVENOUS at 18:15

## 2023-06-18 RX ADMIN — MORPHINE SULFATE 2 MG: 2 INJECTION, SOLUTION INTRAMUSCULAR; INTRAVENOUS at 08:39

## 2023-06-18 RX ADMIN — OXYCODONE HYDROCHLORIDE 5 MG: 5 TABLET ORAL at 21:24

## 2023-06-18 RX ADMIN — FOLIC ACID: 5 INJECTION, SOLUTION INTRAMUSCULAR; INTRAVENOUS; SUBCUTANEOUS at 12:29

## 2023-06-18 RX ADMIN — SODIUM CHLORIDE 8 MG/HR: 9 INJECTION, SOLUTION INTRAVENOUS at 10:06

## 2023-06-18 RX ADMIN — MORPHINE SULFATE 2 MG: 2 INJECTION, SOLUTION INTRAMUSCULAR; INTRAVENOUS at 19:14

## 2023-06-18 ASSESSMENT — ACTIVITIES OF DAILY LIVING (ADL)
ADLS_ACUITY_SCORE: 20
ADLS_ACUITY_SCORE: 22
ADLS_ACUITY_SCORE: 20

## 2023-06-18 NOTE — PLAN OF CARE
End of Shift Summary  For vital signs and complete assessments, please see documentation flowsheets.     Pertinent assessments: VSS. Afebrile. LS clear. BS x4. Pt reporting abdominal pain, treated with Morphine x1. Denies nausea, stated Zofran worked well from prior shift. Clear Liquid diet, tolerating well. SBA to ambulate. PIV - IVF and Protonix drip infusing. CIWA scores of 0 and 0. Jaundiced throughout. A&O, alarm on bed for safety. Slept well.    Major Shift Events: Mag replaced. Refused gabapentin and clonidine.     Treatment Plan: Encourage activity, Monitor Hbg, Monitor for bleeding, Advance and tolerate diet, Discharge TBD.

## 2023-06-18 NOTE — PLAN OF CARE
Assessments:   A/O x4. VSS. Up SBA. Complains of epigastric pain radiating to sides, LUQ and RLQ, takes Q4 PRN IV morphine and oxycodone for pain level of about 7-8/10. Nausea x1, ODT zofran given. CIWA score zero throughout shift.     Major Shift Events:  >admitted to floor around 1300  >echocardiogram done  >magnesium replaced    Treatment Plan: Protonix drip, CIWA protocol, monitor Hgb / for bleeding, CIWA protocol    Bedside Nurse: Angel Turner RN

## 2023-06-18 NOTE — PROGRESS NOTES
[facility]    Medicine Progress Note - Hospitalist Service    Date of Admission:  6/17/2023    Assessment & Plan   Tanika Chau is a 27 year old female with PMH significant for alcohol abuse, cirrhosis, gastric diverticulum admitted on 6/17/2023 for upper GIB.     Hematemesis w/suspected desmond vaughan tear  Acute blood loss anemia  -presented with multiple episodes of hematemesis, mostly PTA. Hgb downtrended to low 9's but has now stabilized and hematesis has resolved.  -initially started on IV protonix drip and later octreotide which has been held  -advance diet today, plan to transition to oral protonix next 24 hours   -outpt follow-up with GI for outpt endoscopy  -cont to monitor Hgb, transfuse <7 or if active bleeding  -discussed complete cessation of alcohol and NSAIDS which she currently uses    Alcohol abuse  Alcoholic hepatitis  -Last EGD in 2019 through [facility] significant for gastric diverticulum, but no varices.  She lives in [place] but follows through [facility] for her liver work up.  She is actually in [place] for follow up which was unfortunately cancelled due to insurance reasons.     -US with diffuse increased hepatic parenchymal echogenicity  -recommend complete alcohol cessation    Dysuria  -check UA     possible PFO  -incidental finding on ECHO, asymptomatic. No further workup indicated     Depression  -Not currently on medication      Diet: Advance Diet as Tolerated: Clear Liquid Diet    DVT Prophylaxis: Pneumatic Compression Devices  Vegas Catheter: Not present  Lines: None     Cardiac Monitoring: None  Code Status: Full Code      Disposition Plan   Possible discharge tomorrow if continued improvement       Juan Luis Callahan,   Hospitalist Service  [facility]  Securely message with [employer] (more info)  Text page via [employer] Paging/Directory   ______________________________________________________________________    Interval History   Still with  some persistent abd discomfort, tolerating clear liquids well but hasn't advanced past that yet. Hgb is stable in mid to low 9's. No melena or other evidence of persistent bleeding. Call out to GI to further discuss plan. Does report some dysuria    Physical Exam   Vital Signs: Temp: 98.7  F (37.1  C) Temp src: Oral BP: 112/69 Pulse: 69   Resp: 16 SpO2: 100 % O2 Device: None (Room air)    Weight: 134 lbs .63 oz    Constitutional: awake, alert and cooperative, chronically ill appearing  Eyes: pupils equal, round and reactive to light and conjunctiva normal  ENT: normocepalic, without obvious abnormality, atramatic  Respiratory: no increased work of breathing, good air exchange and clear to auscultation  Cardiovascular: regular rate and rhythm and no murmur noted  GI: hypoactive bowel sounds, soft, non-distended and very tender mostly in epigastrium  Skin: faint jaundice appearance  Neurologic: alert, oriented, generally weak but no focal deficits noted    50 MINUTES SPENT BY ME on the date of service doing chart review, history, exam, documentation & further activities per the note.      Data   ------------------------- PAST 24 HR DATA REVIEWED -----------------------------------------------    I have personally reviewed the following data over the past 24 hrs:    4.1  \   9.6 (L); 9.6 (L)   / 113 (L)     141 106 8.5 /  88   3.9 25 0.47 (L) \       ALT: 27 AST: 107 (H) AP: 169 (H) TBILI: 1.8 (H)   ALB: 3.8 TOT PROTEIN: 6.7 LIPASE: N/A       INR:  1.29 (H) PTT:  N/A   D-dimer:  N/A Fibrinogen:  N/A       Imaging results reviewed over the past 24 hrs:   Recent Results (from the past 24 hour(s))   US Abdomen Complete w Doppler Complete    Narrative    EXAM: ULTRASOUND ABDOMEN COMPLETE WITH DOPPLER COMPLETE  LOCATION: [facility]  DATE: 06/17/2023    INDICATION: Evaluate liver, ETOH.  COMPARISON: Abdominal ultrasound on 08/26/2020 and CT abdomen and pelvis on 08/17/2020.  TECHNIQUE: Complete  abdominal ultrasound. Color flow with spectral Doppler and waveform analysis performed.     FINDINGS:    GALLBLADDER: No cholelithiasis, gallbladder wall thickening or pericholecystic fluid. Sonographic Mckeon's sign is negative.     BILE DUCTS: No biliary dilatation. The common duct measures 6 mm.    LIVER: Demonstrates diffuse increased parenchymal echogenicity. No focal mass.     RIGHT KIDNEY: Normal size. Normal echogenicity with no hydronephrosis or mass.     LEFT KIDNEY: Normal size. No hydronephrosis.    SPLEEN: The spleen is enlarged measuring 16.2 cm in craniocaudal dimension.    PANCREAS: The visualized portions are normal.    AORTA: Normal in caliber.    IVC: Normal where visualized.    Small ascites.    ABDOMINAL DUPLEX: The hepatic artery, hepatic veins, IVC, portal veins, and splenic vein are patent with flow in the normal direction.       Impression    IMPRESSION:  1.  Diffuse increased hepatic parenchymal echogenicity, likely due to chronic parenchymal liver disease including hepatic steatosis with or without fibrosis. No focal hepatic mass visualized.  2.  Splenomegaly.  3.  Trace volume ascites.  4.  Patent hepatic vasculature with normal directional flow.       Echocardiogram Complete   Result Value    LVEF  65-70%    [facility]    [id number]  [id number]  [id number]  [id number]^KATY^JEISON^MANJULA     [facility]  Echocardiography Laboratory  [address] [place] [address]     Name: ROXANE MARTINEZ  MRN: [id number]  : 1995  Study Date: 2023 03:09 PM  Age: 27 yrs  Gender: Female  Patient Location: [facility]  Reason For Study: Murmur  Ordering Physician: JEISON BURNS  Performed By: Boom Gallagher RDCS     BSA: 1.7 m2  Height: 65 in  Weight: 134 lb  HR: 92  BP: 113/72 mmHg  ______________________________________________________________________________  Procedure  Complete Portable Echo  Adult.  ______________________________________________________________________________  Interpretation Summary     The left ventricle is normal in structure, function and size.  The visual ejection fraction is 65-70%.  The right ventricle is normal in structure, function and size.  There is mild biatrial enlargement.  High velocity flow seen across AV valves  ______________________________________________________________________________  Left Ventricle  The left ventricle is normal in structure, function and size. There is normal  left ventricular wall thickness. Left ventricular systolic function is normal.  The visual ejection fraction is 65-70%. No regional wall motion abnormalities  noted.     Right Ventricle  The right ventricle is normal in structure, function and size.     Atria  There is mild biatrial enlargement. Cannot exlude small PFO/ASD.     Mitral Valve  The mitral valve is normal in structure and function. There is trace mitral  regurgitation.     Tricuspid Valve  The tricuspid valve is normal in structure and function. There is trace  tricuspid regurgitation. The right ventricular systolic pressure is  approximated at 24.1 mmHg plus the right atrial pressure.     Aortic Valve  The aortic valve is normal in structure and function.     Pulmonic Valve  The pulmonic valve is normal in structure and function.     Vessels  Normal size aorta.     Pericardium  There is no pericardial effusion.     Rhythm  Sinus rhythm was noted.  ______________________________________________________________________________  MMode/2D Measurements & Calculations     IVSd: 0.94 cm  LVIDd: 4.9 cm  LVIDs: 2.6 cm  LVPWd: 0.90 cm  IVC diam: 2.3 cm  FS: 47.5 %  LV mass(C)d: 155.1 grams  LV mass(C)dI: 93.0 grams/m2  Ao root diam: 2.7 cm  LA dimension: 3.8 cm  LA/Ao: 1.4  LA Volume (BP): 54.6 ml  LA Volume Index (BP): 32.7 ml/m2  RV Base: 3.9 cm  RWT: 0.37     TAPSE: 3.2 cm     Doppler Measurements & Calculations  MV E max chikis: 135.0  cm/sec  MV A max zachariah: 101.4 cm/sec  MV E/A: 1.3  MV dec time: 0.17 sec  Ao V2 max: 197.3 cm/sec  Ao max P.0 mmHg  LV V1 max P.7 mmHg  LV V1 max: 178.4 cm/sec  TR max zachariah: 245.4 cm/sec  TR max P.1 mmHg  AV Zachariah Ratio (DI): 0.90  E/E' av.6  Lateral E/e': 6.7  Medial E/e': 10.5  RV S Zachariah: 17.2 cm/sec     ______________________________________________________________________________  Report approved by: Zuleima Ma 2023 05:04 PM

## 2023-06-18 NOTE — PROGRESS NOTES
"[facility] - Digestive Health Progress Note     IMPRESSION:  Acute etoh hepatitis  UGIB, likely Rupali-Garland tear  Anemia, multifactorial with some blood loss and etoh BM suppression     LFTs improving, no additional bleeding.      RECOMMENDATIONS:  -Okay to advance diet, likely discharge today/tomorrow on 1mo of PPI.  -Needs to pursue sobriety, she expresses understanding of this.   -Suggested she follow up with GI near her home for further liver eval to also include EGD to exclude gastropathy etc.     Will sign off.     Total time spent in chart review, direct medical discussion, examination, and documentation was 15 minutes    Malachi Multani DO   [facility] Digestive [facility]  Cell [phone]    ________________________________________________________________________      SUBJECTIVE:  No BM.  Would like to advance diet.      OBJECTIVE:  /69 (BP Location: Right arm)   Pulse 69   Temp 98.7  F (37.1  C) (Oral)   Resp 16   Ht 1.676 m (5' 6\")   Wt 60.8 kg (134 lb 0.6 oz)   SpO2 100%   BMI 21.63 kg/m    Temp (24hrs), Av.7  F (37.1  C), Min:98.6  F (37  C), Max:98.8  F (37.1  C)    Patient Vitals for the past 72 hrs:   Weight   23 1307 60.8 kg (134 lb 0.6 oz)       Intake/Output Summary (Last 24 hours) at 2023 1532  Last data filed at 2023 0633  Gross per 24 hour   Intake 2140 ml   Output --   Net 2140 ml        PHYSICAL EXAM  GEN: Alert, oriented x3, communicative and in NAD.    ABD: ND, +BS, no guarding or pain to palpation, no rebound, no HSM.    Additional Data:  I have reviewed the patient's new clinical lab results:     Recent Labs   Lab Test 23  0614 23  0156 23  2159 23  0604 23  0401 20  1343 20  0128 19  1058   WBC 4.1  --   --   --  6.7 6.6   < > 5.0   HGB 9.6*  9.6* 8.8* 9.1*   < > 11.4* 14.3   < > 14.0   *  --   --   --  110* 105*   < > 100   *  --   --   --  147* 338   < > 256   INR 1.29*  --   --   --  1.21*  --   --  " 1.07    < > = values in this interval not displayed.     Recent Labs   Lab Test 06/18/23  0614 06/17/23  0401 09/07/20  1344   POTASSIUM 3.9 3.5 3.9   CHLORIDE 106 101 103   CO2 25 21* 20*   BUN 8.5 11.2 7*   ANIONGAP 10 18* 16     Recent Labs   Lab Test 06/18/23  1509 06/18/23  0614 06/17/23  0401 09/07/20  1344 08/26/20  2150 08/17/20  0128 08/17/20  0106 08/14/19  0612 08/13/19  1405   ALBUMIN  --  3.8 4.4 4.6 4.8   < >  --    < >  --    BILITOTAL  --  1.8* 2.4* 1.0 1.2*   < >  --    < >  --    ALT  --  27 32 43 79*   < >  --    < >  --    AST  --  107* 120* 60* 180*   < >  --    < >  --    PROTEIN Negative  --   --   --   --   --  Negative  --  30 mg/dL*   LIPASE  --   --  36 <9 <9   < >  --    < >  --     < > = values in this interval not displayed.

## 2023-06-19 VITALS
TEMPERATURE: 98.8 F | HEART RATE: 76 BPM | RESPIRATION RATE: 16 BRPM | SYSTOLIC BLOOD PRESSURE: 102 MMHG | BODY MASS INDEX: 21.54 KG/M2 | HEIGHT: 66 IN | OXYGEN SATURATION: 98 % | DIASTOLIC BLOOD PRESSURE: 52 MMHG | WEIGHT: 134.04 LBS

## 2023-06-19 LAB
ANION GAP SERPL CALCULATED.3IONS-SCNC: 8 MMOL/L (ref 7–15)
BUN SERPL-MCNC: 5 MG/DL (ref 6–20)
CALCIUM SERPL-MCNC: 8.5 MG/DL (ref 8.6–10)
CHLORIDE SERPL-SCNC: 105 MMOL/L (ref 98–107)
CREAT SERPL-MCNC: 0.5 MG/DL (ref 0.51–0.95)
DEPRECATED HCO3 PLAS-SCNC: 26 MMOL/L (ref 22–29)
ERYTHROCYTE [DISTWIDTH] IN BLOOD BY AUTOMATED COUNT: 13.8 % (ref 10–15)
GFR SERPL CREATININE-BSD FRML MDRD: >90 ML/MIN/1.73M2
GLUCOSE SERPL-MCNC: 99 MG/DL (ref 70–99)
HCT VFR BLD AUTO: 28.1 % (ref 35–47)
HGB BLD-MCNC: 9 G/DL (ref 11.7–15.7)
MCH RBC QN AUTO: 35.6 PG (ref 26.5–33)
MCHC RBC AUTO-ENTMCNC: 32 G/DL (ref 31.5–36.5)
MCV RBC AUTO: 111 FL (ref 78–100)
PLATELET # BLD AUTO: 114 10E3/UL (ref 150–450)
POTASSIUM SERPL-SCNC: 3.7 MMOL/L (ref 3.4–5.3)
RBC # BLD AUTO: 2.53 10E6/UL (ref 3.8–5.2)
SODIUM SERPL-SCNC: 139 MMOL/L (ref 136–145)
WBC # BLD AUTO: 4.5 10E3/UL (ref 4–11)

## 2023-06-19 PROCEDURE — 250N000013 HC RX MED GY IP 250 OP 250 PS 637: Performed by: HOSPITALIST

## 2023-06-19 PROCEDURE — 250N000011 HC RX IP 250 OP 636: Performed by: NURSE PRACTITIONER

## 2023-06-19 PROCEDURE — 80048 BASIC METABOLIC PNL TOTAL CA: CPT | Performed by: HOSPITALIST

## 2023-06-19 PROCEDURE — 85027 COMPLETE CBC AUTOMATED: CPT | Performed by: HOSPITALIST

## 2023-06-19 PROCEDURE — 258N000003 HC RX IP 258 OP 636: Performed by: NURSE PRACTITIONER

## 2023-06-19 PROCEDURE — 36415 COLL VENOUS BLD VENIPUNCTURE: CPT | Performed by: HOSPITALIST

## 2023-06-19 PROCEDURE — 250N000011 HC RX IP 250 OP 636: Performed by: HOSPITALIST

## 2023-06-19 PROCEDURE — 99239 HOSP IP/OBS DSCHRG MGMT >30: CPT | Performed by: HOSPITALIST

## 2023-06-19 PROCEDURE — C9113 INJ PANTOPRAZOLE SODIUM, VIA: HCPCS | Performed by: NURSE PRACTITIONER

## 2023-06-19 RX ORDER — ONDANSETRON 4 MG/1
4 TABLET, ORALLY DISINTEGRATING ORAL EVERY 6 HOURS PRN
Qty: 10 TABLET | Refills: 0 | Status: ON HOLD | OUTPATIENT
Start: 2023-06-19 | End: 2023-08-11

## 2023-06-19 RX ORDER — PANTOPRAZOLE SODIUM 40 MG/1
40 TABLET, DELAYED RELEASE ORAL
Status: DISCONTINUED | OUTPATIENT
Start: 2023-06-19 | End: 2023-06-19 | Stop reason: HOSPADM

## 2023-06-19 RX ORDER — PANTOPRAZOLE SODIUM 40 MG/1
40 TABLET, DELAYED RELEASE ORAL
Qty: 120 TABLET | Refills: 0 | Status: ON HOLD | OUTPATIENT
Start: 2023-06-19 | End: 2023-08-11

## 2023-06-19 RX ADMIN — MORPHINE SULFATE 2 MG: 2 INJECTION, SOLUTION INTRAMUSCULAR; INTRAVENOUS at 08:53

## 2023-06-19 RX ADMIN — SODIUM CHLORIDE 8 MG/HR: 9 INJECTION, SOLUTION INTRAVENOUS at 03:37

## 2023-06-19 RX ADMIN — PANTOPRAZOLE SODIUM 40 MG: 40 TABLET, DELAYED RELEASE ORAL at 08:53

## 2023-06-19 RX ADMIN — MORPHINE SULFATE 2 MG: 2 INJECTION, SOLUTION INTRAMUSCULAR; INTRAVENOUS at 03:32

## 2023-06-19 ASSESSMENT — ACTIVITIES OF DAILY LIVING (ADL)
ADLS_ACUITY_SCORE: 22

## 2023-06-19 NOTE — CONSULTS
Care Management Discharge Note    Discharge Date: 06/19/2023       Discharge Disposition:  Home    Discharge Services:  None    Discharge DME:  None    Discharge Transportation:  Family/Friend    Private pay costs discussed: Not applicable    Handoff Referral Completed: No    Additional Information:  Sw aware of consult for MI/CD resources.  Pt is from [place] and Sw does not have resources for the [place] area.  Per chart review, [facility] spoke with the pt about pursuing sobriety and she expressed understanding.    Sw updated the pt's AVS to include instructions to abstain from future alcohol use.    No additional Sw needs at this time.      PRUDENCIO Armstrong, [facility]  Inpatient Care Coordination  [facility]  [phone]

## 2023-06-19 NOTE — PLAN OF CARE
Assessments:   A/O x4. VSS. On regular diet, complained of abdominal pain gave Morphine x2 for pain relief. Hyperactive BS. No BM but patient stated she is passing gas. Denies nausea. SBA to ambulate. CIWA scores of 0 and 0.     Major Shift Events: Patient tried heat pad for abdominal pain relief.    Treatment Plan: Encourage activity, Monitor Hgb, Monitor for bleeding, possible discharge tomorrow    Bedside Nurse:Jethro Brizuela RN

## 2023-06-19 NOTE — DISCHARGE SUMMARY
[facility]  Hospitalist Discharge Summary      Date of Admission:  6/17/2023  Date of Discharge:  6/19/2023 12:28 PM  Discharging Provider: Juan Luis Callahan DO  Discharge Service: Hospitalist Service    Discharge Diagnoses   Hematemesis w/suspected desmond vaughan tear  Acute blood loss anemia  Alcohol dependence/abuse  Alcoholic hepatitis  Possible PFO  depression    Follow-ups Needed After Discharge   Follow-up Appointments     Follow-up and recommended labs and tests       Follow up with primary care provider, Physician No Ref-Primary, within 7   days for hospital follow- up.  No follow up labs or test are needed.    Follow up with GI in 2-4 weeks or as directed           Discharge Disposition   Discharged to home  Condition at discharge: Stable    Hospital Course   Tanika Chau is a 27 year old female with PMH significant for alcohol abuse, cirrhosis, gastric diverticulum admitted on 6/17/2023 for upper GIB.     Hematemesis w/suspected desmond vaughan tear  Acute blood loss anemia  -presented with multiple episodes of hematemesis, mostly PTA. Hgb downtrended to low 9's but has now stabilized and hematesis has resolved.  -initially started on IV protonix drip and later octreotide which was stopped  -discussed complete cessation of alcohol and NSAIDS which she currently uses  -IV protonix transitioned to oral 40 BID, will continue on discharge and follow-up with GI as outpt for EGD  -tolerating diet and medically stable for discharge home    Alcohol abuse  Alcoholic hepatitis  -Last EGD in 2019 through [facility] significant for gastric diverticulum, but no varices.  She lives in [place] but follows through [facility] for her liver work up.  She is actually in [place] for follow up which was unfortunately cancelled due to insurance reasons.     -US with diffuse increased hepatic parenchymal echogenicity  -recommend complete alcohol cessation    Dysuria  -UA without evidence of UTI     possible  PFO  -incidental finding on ECHO, asymptomatic. No further workup indicated     Depression  -Not currently on medication     Consultations This Hospital Stay   GASTROENTEROLOGY IP CONSULT  CARE MANAGEMENT / SOCIAL WORK IP CONSULT    Code Status   Full Code    Time Spent on this Encounter   I, Juan Luis Callahan DO, personally saw the patient today and spent greater than 30 minutes discharging this patient.       Juan Luis Callahan DO  [place] [address] MEDICAL SURGICAL  [address] [facility] [address]  Phone: [phone]  Fax: [phone]  ______________________________________________________________________    Physical Exam   Vital Signs: Temp: 98.8  F (37.1  C) Temp src: Oral BP: 102/52 Pulse: 76   Resp: 16 SpO2: 98 % O2 Device: None (Room air)    Weight: 134 lbs .63 oz  Face to face completed day of discharge       Primary Care Physician   Physician No Ref-Primary    Discharge Orders      Reason for your hospital stay    Admitted for GI bleeding, alcohol dependence. Suspected to have a desmond vaughan tear by GI, recommend oral protonix 40 BID and close outpt follow up for eventual EGD. Complete alcohol cessation     Follow-up and recommended labs and tests     Follow up with primary care provider, Physician No Ref-Primary, within 7 days for hospital follow- up.  No follow up labs or test are needed.    Follow up with GI in 2-4 weeks or as directed     Activity    Your activity upon discharge: activity as tolerated     Diet    Follow this diet upon discharge: Orders Placed This Encounter      Regular Diet Adult       Significant Results and Procedures   Most Recent 3 CBC's:Recent Labs   Lab Test 06/19/23  0746 06/18/23  0614 06/18/23  0156 06/17/23  0604 06/17/23  0401   WBC 4.5 4.1  --   --  6.7   HGB 9.0* 9.6*  9.6* 8.8*   < > 11.4*   * 111*  --   --  110*   * 113*  --   --  147*    < > = values in this interval not displayed.     Most Recent 3 BMP's:Recent Labs   Lab Test  06/19/23  0746 06/18/23  0614 06/17/23  0401    141 140   POTASSIUM 3.7 3.9 3.5   CHLORIDE 105 106 101   CO2 26 25 21*   BUN 5.0* 8.5 11.2   CR 0.50* 0.47* 0.38*   ANIONGAP 8 10 18*   NORA 8.5* 8.0* 10.6*   GLC 99 88 118*     Most Recent 2 LFT's:Recent Labs   Lab Test 06/18/23  0614 06/17/23  0401   * 120*   ALT 27 32   ALKPHOS 169* 247*   BILITOTAL 1.8* 2.4*     Most Recent 3 INR's:Recent Labs   Lab Test 06/18/23  0614 06/17/23  0401 09/25/19  1058   INR 1.29* 1.21* 1.07     Most Recent 3 Troponin's:No lab results found.  Most Recent 3 BNP's:No lab results found.  Most Recent D-dimer:No lab results found.,   Results for orders placed or performed during the hospital encounter of 06/17/23   US Abdomen Complete w Doppler Complete    Narrative    EXAM: ULTRASOUND ABDOMEN COMPLETE WITH DOPPLER COMPLETE  LOCATION: [facility]  DATE: 06/17/2023    INDICATION: Evaluate liver, ETOH.  COMPARISON: Abdominal ultrasound on 08/26/2020 and CT abdomen and pelvis on 08/17/2020.  TECHNIQUE: Complete abdominal ultrasound. Color flow with spectral Doppler and waveform analysis performed.     FINDINGS:    GALLBLADDER: No cholelithiasis, gallbladder wall thickening or pericholecystic fluid. Sonographic Mckeon's sign is negative.     BILE DUCTS: No biliary dilatation. The common duct measures 6 mm.    LIVER: Demonstrates diffuse increased parenchymal echogenicity. No focal mass.     RIGHT KIDNEY: Normal size. Normal echogenicity with no hydronephrosis or mass.     LEFT KIDNEY: Normal size. No hydronephrosis.    SPLEEN: The spleen is enlarged measuring 16.2 cm in craniocaudal dimension.    PANCREAS: The visualized portions are normal.    AORTA: Normal in caliber.    IVC: Normal where visualized.    Small ascites.    ABDOMINAL DUPLEX: The hepatic artery, hepatic veins, IVC, portal veins, and splenic vein are patent with flow in the normal direction.       Impression    IMPRESSION:  1.  Diffuse increased  hepatic parenchymal echogenicity, likely due to chronic parenchymal liver disease including hepatic steatosis with or without fibrosis. No focal hepatic mass visualized.  2.  Splenomegaly.  3.  Trace volume ascites.  4.  Patent hepatic vasculature with normal directional flow.       Echocardiogram Complete     Value    LVEF  65-70%    [facility]    [id number]  [id number]  [id number]  [id number]^KATY^JEISON^MANJULA     [facility]  Echocardiography Laboratory  [address] [place] [address]     Name: ROXANE MARTINEZ  MRN: [id number]  : 1995  Study Date: 2023 03:09 PM  Age: 27 yrs  Gender: Female  Patient Location: [facility]  Reason For Study: Murmur  Ordering Physician: JEISON BRUNS  Performed By: Boom Gallagher RDCS     BSA: 1.7 m2  Height: 65 in  Weight: 134 lb  HR: 92  BP: 113/72 mmHg  ______________________________________________________________________________  Procedure  Complete Portable Echo Adult.  ______________________________________________________________________________  Interpretation Summary     The left ventricle is normal in structure, function and size.  The visual ejection fraction is 65-70%.  The right ventricle is normal in structure, function and size.  There is mild biatrial enlargement.  High velocity flow seen across AV valves  ______________________________________________________________________________  Left Ventricle  The left ventricle is normal in structure, function and size. There is normal  left ventricular wall thickness. Left ventricular systolic function is normal.  The visual ejection fraction is 65-70%. No regional wall motion abnormalities  noted.     Right Ventricle  The right ventricle is normal in structure, function and size.     Atria  There is mild biatrial enlargement. Cannot exlude small PFO/ASD.     Mitral Valve  The mitral valve is normal in structure and function. There is trace mitral  regurgitation.     Tricuspid Valve  The tricuspid  valve is normal in structure and function. There is trace  tricuspid regurgitation. The right ventricular systolic pressure is  approximated at 24.1 mmHg plus the right atrial pressure.     Aortic Valve  The aortic valve is normal in structure and function.     Pulmonic Valve  The pulmonic valve is normal in structure and function.     Vessels  Normal size aorta.     Pericardium  There is no pericardial effusion.     Rhythm  Sinus rhythm was noted.  ______________________________________________________________________________  MMode/2D Measurements & Calculations     IVSd: 0.94 cm  LVIDd: 4.9 cm  LVIDs: 2.6 cm  LVPWd: 0.90 cm  IVC diam: 2.3 cm  FS: 47.5 %  LV mass(C)d: 155.1 grams  LV mass(C)dI: 93.0 grams/m2  Ao root diam: 2.7 cm  LA dimension: 3.8 cm  LA/Ao: 1.4  LA Volume (BP): 54.6 ml  LA Volume Index (BP): 32.7 ml/m2  RV Base: 3.9 cm  RWT: 0.37     TAPSE: 3.2 cm     Doppler Measurements & Calculations  MV E max zachariah: 135.0 cm/sec  MV A max zachariah: 101.4 cm/sec  MV E/A: 1.3  MV dec time: 0.17 sec  Ao V2 max: 197.3 cm/sec  Ao max P.0 mmHg  LV V1 max P.7 mmHg  LV V1 max: 178.4 cm/sec  TR max zachariah: 245.4 cm/sec  TR max P.1 mmHg  AV Zachariah Ratio (DI): 0.90  E/E' av.6  Lateral E/e': 6.7  Medial E/e': 10.5  RV S Zachariah: 17.2 cm/sec     ______________________________________________________________________________  Report approved by: Zuleima Ma 2023 05:04 PM               Discharge Medications   Discharge Medication List as of 2023 12:14 PM      START taking these medications    Details   ondansetron (ZOFRAN ODT) 4 MG ODT tab Take 1 tablet (4 mg) by mouth every 6 hours as needed for nausea or vomiting, Disp-10 tablet, R-0, E-Prescribe      pantoprazole (PROTONIX) 40 MG EC tablet Take 1 tablet (40 mg) by mouth 2 times daily (before meals) for 60 days, Disp-120 tablet, R-0, E-Prescribe         CONTINUE these medications which have NOT CHANGED    Details   multivitamin therapeutic tablet  [MULTIVITAMIN THERAPEUTIC TABLET] Take 1 tablet by mouth daily., Historical           Allergies   Allergies   Allergen Reactions     Acetaminophen Unknown     impaired liver function, Other reaction(s): Other (see comments), impaired liver function     Dilaudid [Hydromorphone] Nausea

## 2023-06-19 NOTE — PLAN OF CARE
Assessments:   A/O x4. VSS. On regular diet, complained of abdominal pain after eating dinner, gave Morphine x1, oxycodone x1. Denies nausea. SBA to ambulate. CIWA scores of 0 and 0.     Major Shift Events: patient tried little bit of Mac and cheese, pizza, cottage cheese which did not go well per patient    Treatment Plan: Encourage activity, Monitor Hgb, Monitor for bleeding, possible discharge tomorrow    Bedside Nurse: Angel Turner RN

## 2023-06-19 NOTE — DISCHARGE SUMMARY
Discharge Note    Patient discharged to home via private vehicle  accompanied by no family/friend .  IV: Discontinued  Prescriptions printed and given to patient/family.   Belongings reviewed and sent with patient.   Home medications returned to patient: NA  Equipment sent with: patient, N/A.   patient verbalizes understanding of discharge instructions. AVS given to patient.  Additional education completed? NA

## 2023-08-10 ENCOUNTER — HOSPITAL ENCOUNTER (INPATIENT)
Facility: CLINIC | Age: 28
LOS: 2 days | Discharge: HOME | DRG: 432 | End: 2023-08-13
Attending: EMERGENCY MEDICINE | Admitting: INTERNAL MEDICINE
Payer: COMMERCIAL

## 2023-08-10 DIAGNOSIS — K92.0 HEMATEMESIS WITH NAUSEA: ICD-10-CM

## 2023-08-10 DIAGNOSIS — F10.10 ALCOHOL ABUSE: ICD-10-CM

## 2023-08-10 DIAGNOSIS — K70.30 ALCOHOLIC CIRRHOSIS, UNSPECIFIED WHETHER ASCITES PRESENT (H): Primary | ICD-10-CM

## 2023-08-10 DIAGNOSIS — D62 ANEMIA DUE TO BLOOD LOSS, ACUTE: ICD-10-CM

## 2023-08-10 DIAGNOSIS — K92.1 MELENA: ICD-10-CM

## 2023-08-10 LAB
ABO/RH(D): NORMAL
ALBUMIN SERPL BCG-MCNC: 4 G/DL (ref 3.5–5.2)
ALP SERPL-CCNC: 306 U/L (ref 35–104)
ALT SERPL W P-5'-P-CCNC: 32 U/L (ref 0–50)
ANION GAP SERPL CALCULATED.3IONS-SCNC: 17 MMOL/L (ref 7–15)
ANTIBODY SCREEN: NEGATIVE
AST SERPL W P-5'-P-CCNC: 162 U/L (ref 0–45)
BASOPHILS # BLD AUTO: 0.1 10E3/UL (ref 0–0.2)
BASOPHILS NFR BLD AUTO: 1 %
BILIRUB SERPL-MCNC: 4.1 MG/DL
BUN SERPL-MCNC: 12.3 MG/DL (ref 6–20)
CALCIUM SERPL-MCNC: 9.7 MG/DL (ref 8.6–10)
CHLORIDE SERPL-SCNC: 97 MMOL/L (ref 98–107)
CREAT SERPL-MCNC: 0.37 MG/DL (ref 0.51–0.95)
DEPRECATED HCO3 PLAS-SCNC: 26 MMOL/L (ref 22–29)
EOSINOPHIL # BLD AUTO: 0 10E3/UL (ref 0–0.7)
EOSINOPHIL NFR BLD AUTO: 0 %
ERYTHROCYTE [DISTWIDTH] IN BLOOD BY AUTOMATED COUNT: 17.9 % (ref 10–15)
ETHANOL SERPL-MCNC: <0.01 G/DL
GFR SERPL CREATININE-BSD FRML MDRD: >90 ML/MIN/1.73M2
GLUCOSE SERPL-MCNC: 86 MG/DL (ref 70–99)
HCG SERPL QL: NEGATIVE
HCT VFR BLD AUTO: 32.7 % (ref 35–47)
HGB BLD-MCNC: 10.9 G/DL (ref 11.7–15.7)
HOLD SPECIMEN: NORMAL
IMM GRANULOCYTES # BLD: 0.1 10E3/UL
IMM GRANULOCYTES NFR BLD: 1 %
LACTATE SERPL-SCNC: 1.5 MMOL/L (ref 0.7–2)
LIPASE SERPL-CCNC: 37 U/L (ref 13–60)
LYMPHOCYTES # BLD AUTO: 0.9 10E3/UL (ref 0.8–5.3)
LYMPHOCYTES NFR BLD AUTO: 13 %
MAGNESIUM SERPL-MCNC: 1.5 MG/DL (ref 1.7–2.3)
MCH RBC QN AUTO: 33.7 PG (ref 26.5–33)
MCHC RBC AUTO-ENTMCNC: 33.3 G/DL (ref 31.5–36.5)
MCV RBC AUTO: 101 FL (ref 78–100)
MONOCYTES # BLD AUTO: 0.7 10E3/UL (ref 0–1.3)
MONOCYTES NFR BLD AUTO: 9 %
NEUTROPHILS # BLD AUTO: 5.3 10E3/UL (ref 1.6–8.3)
NEUTROPHILS NFR BLD AUTO: 76 %
NRBC # BLD AUTO: 0 10E3/UL
NRBC BLD AUTO-RTO: 0 /100
PLATELET # BLD AUTO: 112 10E3/UL (ref 150–450)
POTASSIUM SERPL-SCNC: 3.8 MMOL/L (ref 3.4–5.3)
PROT SERPL-MCNC: 8.2 G/DL (ref 6.4–8.3)
RBC # BLD AUTO: 3.23 10E6/UL (ref 3.8–5.2)
SODIUM SERPL-SCNC: 140 MMOL/L (ref 136–145)
SPECIMEN EXPIRATION DATE: NORMAL
WBC # BLD AUTO: 7 10E3/UL (ref 4–11)

## 2023-08-10 PROCEDURE — 83605 ASSAY OF LACTIC ACID: CPT | Performed by: EMERGENCY MEDICINE

## 2023-08-10 PROCEDURE — 99285 EMERGENCY DEPT VISIT HI MDM: CPT | Mod: 25

## 2023-08-10 PROCEDURE — 36415 COLL VENOUS BLD VENIPUNCTURE: CPT | Performed by: EMERGENCY MEDICINE

## 2023-08-10 PROCEDURE — 83735 ASSAY OF MAGNESIUM: CPT | Performed by: EMERGENCY MEDICINE

## 2023-08-10 PROCEDURE — 82077 ASSAY SPEC XCP UR&BREATH IA: CPT | Performed by: EMERGENCY MEDICINE

## 2023-08-10 PROCEDURE — 96374 THER/PROPH/DIAG INJ IV PUSH: CPT

## 2023-08-10 PROCEDURE — 99223 1ST HOSP IP/OBS HIGH 75: CPT | Performed by: INTERNAL MEDICINE

## 2023-08-10 PROCEDURE — 250N000011 HC RX IP 250 OP 636: Mod: JZ | Performed by: EMERGENCY MEDICINE

## 2023-08-10 PROCEDURE — 96361 HYDRATE IV INFUSION ADD-ON: CPT

## 2023-08-10 PROCEDURE — 80053 COMPREHEN METABOLIC PANEL: CPT | Performed by: EMERGENCY MEDICINE

## 2023-08-10 PROCEDURE — 86901 BLOOD TYPING SEROLOGIC RH(D): CPT | Performed by: EMERGENCY MEDICINE

## 2023-08-10 PROCEDURE — 258N000003 HC RX IP 258 OP 636: Performed by: EMERGENCY MEDICINE

## 2023-08-10 PROCEDURE — 96375 TX/PRO/DX INJ NEW DRUG ADDON: CPT

## 2023-08-10 PROCEDURE — 85025 COMPLETE CBC W/AUTO DIFF WBC: CPT | Performed by: EMERGENCY MEDICINE

## 2023-08-10 PROCEDURE — 83690 ASSAY OF LIPASE: CPT | Performed by: EMERGENCY MEDICINE

## 2023-08-10 PROCEDURE — 96365 THER/PROPH/DIAG IV INF INIT: CPT

## 2023-08-10 PROCEDURE — C9113 INJ PANTOPRAZOLE SODIUM, VIA: HCPCS | Mod: JZ | Performed by: EMERGENCY MEDICINE

## 2023-08-10 PROCEDURE — 82010 KETONE BODYS QUAN: CPT | Performed by: INTERNAL MEDICINE

## 2023-08-10 PROCEDURE — 86850 RBC ANTIBODY SCREEN: CPT | Performed by: EMERGENCY MEDICINE

## 2023-08-10 PROCEDURE — 84703 CHORIONIC GONADOTROPIN ASSAY: CPT | Performed by: EMERGENCY MEDICINE

## 2023-08-10 RX ORDER — ONDANSETRON 2 MG/ML
4 INJECTION INTRAMUSCULAR; INTRAVENOUS EVERY 30 MIN PRN
Status: DISCONTINUED | OUTPATIENT
Start: 2023-08-10 | End: 2023-08-11

## 2023-08-10 RX ORDER — MORPHINE SULFATE 4 MG/ML
4 INJECTION, SOLUTION INTRAMUSCULAR; INTRAVENOUS
Status: COMPLETED | OUTPATIENT
Start: 2023-08-10 | End: 2023-08-10

## 2023-08-10 RX ORDER — MAGNESIUM SULFATE HEPTAHYDRATE 40 MG/ML
2 INJECTION, SOLUTION INTRAVENOUS ONCE
Status: COMPLETED | OUTPATIENT
Start: 2023-08-10 | End: 2023-08-11

## 2023-08-10 RX ADMIN — MAGNESIUM SULFATE HEPTAHYDRATE 2 G: 2 INJECTION, SOLUTION INTRAVENOUS at 23:58

## 2023-08-10 RX ADMIN — SODIUM CHLORIDE 8 MG/HR: 9 INJECTION, SOLUTION INTRAVENOUS at 22:35

## 2023-08-10 RX ADMIN — ONDANSETRON 4 MG: 2 INJECTION INTRAMUSCULAR; INTRAVENOUS at 22:24

## 2023-08-10 RX ADMIN — MORPHINE SULFATE 4 MG: 4 INJECTION, SOLUTION INTRAMUSCULAR; INTRAVENOUS at 22:30

## 2023-08-10 RX ADMIN — SODIUM CHLORIDE 1000 ML: 9 INJECTION, SOLUTION INTRAVENOUS at 22:25

## 2023-08-10 RX ADMIN — PANTOPRAZOLE SODIUM 40 MG: 40 INJECTION, POWDER, FOR SOLUTION INTRAVENOUS at 22:24

## 2023-08-10 ASSESSMENT — ACTIVITIES OF DAILY LIVING (ADL): ADLS_ACUITY_SCORE: 35

## 2023-08-11 ENCOUNTER — ANESTHESIA EVENT (OUTPATIENT)
Dept: SURGERY | Facility: CLINIC | Age: 28
DRG: 432 | End: 2023-08-11
Payer: COMMERCIAL

## 2023-08-11 ENCOUNTER — ANESTHESIA (OUTPATIENT)
Dept: SURGERY | Facility: CLINIC | Age: 28
DRG: 432 | End: 2023-08-11
Payer: COMMERCIAL

## 2023-08-11 ENCOUNTER — APPOINTMENT (OUTPATIENT)
Dept: ULTRASOUND IMAGING | Facility: CLINIC | Age: 28
DRG: 432 | End: 2023-08-11
Attending: INTERNAL MEDICINE
Payer: COMMERCIAL

## 2023-08-11 LAB
ALBUMIN SERPL BCG-MCNC: 3.5 G/DL (ref 3.5–5.2)
ALP SERPL-CCNC: 241 U/L (ref 35–104)
ALT SERPL W P-5'-P-CCNC: 24 U/L (ref 0–50)
ANION GAP SERPL CALCULATED.3IONS-SCNC: 17 MMOL/L (ref 7–15)
AST SERPL W P-5'-P-CCNC: 125 U/L (ref 0–45)
B-OH-BUTYR SERPL-SCNC: 3 MMOL/L
BILIRUB SERPL-MCNC: 3.3 MG/DL
BUN SERPL-MCNC: 11.1 MG/DL (ref 6–20)
CALCIUM SERPL-MCNC: 8.3 MG/DL (ref 8.6–10)
CHLORIDE SERPL-SCNC: 99 MMOL/L (ref 98–107)
CREAT SERPL-MCNC: 0.42 MG/DL (ref 0.51–0.95)
DEPRECATED HCO3 PLAS-SCNC: 23 MMOL/L (ref 22–29)
ERYTHROCYTE [DISTWIDTH] IN BLOOD BY AUTOMATED COUNT: 17.8 % (ref 10–15)
GFR SERPL CREATININE-BSD FRML MDRD: >90 ML/MIN/1.73M2
GLUCOSE BLDC GLUCOMTR-MCNC: 133 MG/DL (ref 70–99)
GLUCOSE SERPL-MCNC: 55 MG/DL (ref 70–99)
HCT VFR BLD AUTO: 29 % (ref 35–47)
HGB BLD-MCNC: 9.4 G/DL (ref 11.7–15.7)
HGB BLD-MCNC: 9.6 G/DL (ref 11.7–15.7)
HGB BLD-MCNC: 9.6 G/DL (ref 11.7–15.7)
MCH RBC QN AUTO: 33.5 PG (ref 26.5–33)
MCHC RBC AUTO-ENTMCNC: 32.4 G/DL (ref 31.5–36.5)
MCV RBC AUTO: 103 FL (ref 78–100)
PLATELET # BLD AUTO: 80 10E3/UL (ref 150–450)
POTASSIUM SERPL-SCNC: 3.7 MMOL/L (ref 3.4–5.3)
PROT SERPL-MCNC: 6.8 G/DL (ref 6.4–8.3)
RBC # BLD AUTO: 2.81 10E6/UL (ref 3.8–5.2)
SODIUM SERPL-SCNC: 139 MMOL/L (ref 136–145)
UPPER GI ENDOSCOPY: NORMAL
WBC # BLD AUTO: 5.4 10E3/UL (ref 4–11)

## 2023-08-11 PROCEDURE — C9113 INJ PANTOPRAZOLE SODIUM, VIA: HCPCS | Mod: JZ | Performed by: INTERNAL MEDICINE

## 2023-08-11 PROCEDURE — 250N000011 HC RX IP 250 OP 636: Performed by: NURSE ANESTHETIST, CERTIFIED REGISTERED

## 2023-08-11 PROCEDURE — 96376 TX/PRO/DX INJ SAME DRUG ADON: CPT

## 2023-08-11 PROCEDURE — 250N000009 HC RX 250: Performed by: NURSE ANESTHETIST, CERTIFIED REGISTERED

## 2023-08-11 PROCEDURE — 258N000003 HC RX IP 258 OP 636: Performed by: ANESTHESIOLOGY

## 2023-08-11 PROCEDURE — 80053 COMPREHEN METABOLIC PANEL: CPT | Performed by: INTERNAL MEDICINE

## 2023-08-11 PROCEDURE — 36415 COLL VENOUS BLD VENIPUNCTURE: CPT | Performed by: INTERNAL MEDICINE

## 2023-08-11 PROCEDURE — 710N000009 HC RECOVERY PHASE 1, LEVEL 1, PER MIN: Performed by: INTERNAL MEDICINE

## 2023-08-11 PROCEDURE — 258N000003 HC RX IP 258 OP 636: Performed by: INTERNAL MEDICINE

## 2023-08-11 PROCEDURE — 96366 THER/PROPH/DIAG IV INF ADDON: CPT

## 2023-08-11 PROCEDURE — 250N000013 HC RX MED GY IP 250 OP 250 PS 637: Performed by: INTERNAL MEDICINE

## 2023-08-11 PROCEDURE — 370N000017 HC ANESTHESIA TECHNICAL FEE, PER MIN: Performed by: INTERNAL MEDICINE

## 2023-08-11 PROCEDURE — G0378 HOSPITAL OBSERVATION PER HR: HCPCS

## 2023-08-11 PROCEDURE — 999N000141 HC STATISTIC PRE-PROCEDURE NURSING ASSESSMENT: Performed by: INTERNAL MEDICINE

## 2023-08-11 PROCEDURE — 272N000001 HC OR GENERAL SUPPLY STERILE: Performed by: INTERNAL MEDICINE

## 2023-08-11 PROCEDURE — 96367 TX/PROPH/DG ADDL SEQ IV INF: CPT

## 2023-08-11 PROCEDURE — 360N000075 HC SURGERY LEVEL 2, PER MIN: Performed by: INTERNAL MEDICINE

## 2023-08-11 PROCEDURE — 258N000001 HC RX 258: Performed by: ANESTHESIOLOGY

## 2023-08-11 PROCEDURE — 250N000011 HC RX IP 250 OP 636: Mod: JZ | Performed by: INTERNAL MEDICINE

## 2023-08-11 PROCEDURE — 250N000011 HC RX IP 250 OP 636: Mod: JZ | Performed by: EMERGENCY MEDICINE

## 2023-08-11 PROCEDURE — 99233 SBSQ HOSP IP/OBS HIGH 50: CPT | Performed by: INTERNAL MEDICINE

## 2023-08-11 PROCEDURE — 06L38CZ OCCLUSION OF ESOPHAGEAL VEIN WITH EXTRALUMINAL DEVICE, VIA NATURAL OR ARTIFICIAL OPENING ENDOSCOPIC: ICD-10-PCS | Performed by: INTERNAL MEDICINE

## 2023-08-11 PROCEDURE — 96375 TX/PRO/DX INJ NEW DRUG ADDON: CPT

## 2023-08-11 PROCEDURE — 85018 HEMOGLOBIN: CPT | Performed by: INTERNAL MEDICINE

## 2023-08-11 PROCEDURE — 76705 ECHO EXAM OF ABDOMEN: CPT

## 2023-08-11 PROCEDURE — 120N000001 HC R&B MED SURG/OB

## 2023-08-11 PROCEDURE — 258N000003 HC RX IP 258 OP 636: Performed by: NURSE ANESTHETIST, CERTIFIED REGISTERED

## 2023-08-11 PROCEDURE — 250N000011 HC RX IP 250 OP 636: Performed by: ANESTHESIOLOGY

## 2023-08-11 PROCEDURE — 250N000025 HC SEVOFLURANE, PER MIN: Performed by: INTERNAL MEDICINE

## 2023-08-11 PROCEDURE — 82962 GLUCOSE BLOOD TEST: CPT

## 2023-08-11 PROCEDURE — 85027 COMPLETE CBC AUTOMATED: CPT | Performed by: INTERNAL MEDICINE

## 2023-08-11 RX ORDER — NALOXONE HYDROCHLORIDE 0.4 MG/ML
0.2 INJECTION, SOLUTION INTRAMUSCULAR; INTRAVENOUS; SUBCUTANEOUS
Status: DISCONTINUED | OUTPATIENT
Start: 2023-08-11 | End: 2023-08-13 | Stop reason: HOSPADM

## 2023-08-11 RX ORDER — DEXTROSE MONOHYDRATE 25 G/50ML
25 INJECTION, SOLUTION INTRAVENOUS ONCE
Status: COMPLETED | OUTPATIENT
Start: 2023-08-11 | End: 2023-08-11

## 2023-08-11 RX ORDER — CEFTRIAXONE 1 G/1
1 INJECTION, POWDER, FOR SOLUTION INTRAMUSCULAR; INTRAVENOUS EVERY 24 HOURS
Status: DISCONTINUED | OUTPATIENT
Start: 2023-08-11 | End: 2023-08-13 | Stop reason: HOSPADM

## 2023-08-11 RX ORDER — HYDROMORPHONE HCL IN WATER/PF 6 MG/30 ML
0.2 PATIENT CONTROLLED ANALGESIA SYRINGE INTRAVENOUS EVERY 5 MIN PRN
Status: DISCONTINUED | OUTPATIENT
Start: 2023-08-11 | End: 2023-08-11 | Stop reason: HOSPADM

## 2023-08-11 RX ORDER — LIDOCAINE 40 MG/G
CREAM TOPICAL
Status: CANCELLED | OUTPATIENT
Start: 2023-08-11

## 2023-08-11 RX ORDER — SODIUM CHLORIDE, SODIUM LACTATE, POTASSIUM CHLORIDE, CALCIUM CHLORIDE 600; 310; 30; 20 MG/100ML; MG/100ML; MG/100ML; MG/100ML
INJECTION, SOLUTION INTRAVENOUS CONTINUOUS PRN
Status: DISCONTINUED | OUTPATIENT
Start: 2023-08-11 | End: 2023-08-11

## 2023-08-11 RX ORDER — ONDANSETRON 2 MG/ML
4 INJECTION INTRAMUSCULAR; INTRAVENOUS EVERY 30 MIN PRN
Status: DISCONTINUED | OUTPATIENT
Start: 2023-08-11 | End: 2023-08-11 | Stop reason: HOSPADM

## 2023-08-11 RX ORDER — ONDANSETRON 4 MG/1
4 TABLET, ORALLY DISINTEGRATING ORAL EVERY 30 MIN PRN
Status: DISCONTINUED | OUTPATIENT
Start: 2023-08-11 | End: 2023-08-11 | Stop reason: HOSPADM

## 2023-08-11 RX ORDER — SODIUM CHLORIDE, SODIUM LACTATE, POTASSIUM CHLORIDE, CALCIUM CHLORIDE 600; 310; 30; 20 MG/100ML; MG/100ML; MG/100ML; MG/100ML
500 INJECTION, SOLUTION INTRAVENOUS CONTINUOUS
Status: CANCELLED | OUTPATIENT
Start: 2023-08-11

## 2023-08-11 RX ORDER — NALOXONE HYDROCHLORIDE 0.4 MG/ML
0.4 INJECTION, SOLUTION INTRAMUSCULAR; INTRAVENOUS; SUBCUTANEOUS
Status: DISCONTINUED | OUTPATIENT
Start: 2023-08-11 | End: 2023-08-13 | Stop reason: HOSPADM

## 2023-08-11 RX ORDER — OXYCODONE HYDROCHLORIDE 5 MG/1
5 TABLET ORAL EVERY 4 HOURS PRN
Status: DISCONTINUED | OUTPATIENT
Start: 2023-08-11 | End: 2023-08-13

## 2023-08-11 RX ORDER — GLYCOPYRROLATE 0.2 MG/ML
INJECTION, SOLUTION INTRAMUSCULAR; INTRAVENOUS PRN
Status: DISCONTINUED | OUTPATIENT
Start: 2023-08-11 | End: 2023-08-11

## 2023-08-11 RX ORDER — FENTANYL CITRATE 50 UG/ML
50 INJECTION, SOLUTION INTRAMUSCULAR; INTRAVENOUS EVERY 5 MIN PRN
Status: DISCONTINUED | OUTPATIENT
Start: 2023-08-11 | End: 2023-08-11 | Stop reason: HOSPADM

## 2023-08-11 RX ORDER — HYDROMORPHONE HCL IN WATER/PF 6 MG/30 ML
0.4 PATIENT CONTROLLED ANALGESIA SYRINGE INTRAVENOUS EVERY 5 MIN PRN
Status: DISCONTINUED | OUTPATIENT
Start: 2023-08-11 | End: 2023-08-11 | Stop reason: HOSPADM

## 2023-08-11 RX ORDER — LANOLIN ALCOHOL/MO/W.PET/CERES
3 CREAM (GRAM) TOPICAL
Status: DISCONTINUED | OUTPATIENT
Start: 2023-08-11 | End: 2023-08-13 | Stop reason: HOSPADM

## 2023-08-11 RX ORDER — METOPROLOL TARTRATE 1 MG/ML
1-2 INJECTION, SOLUTION INTRAVENOUS EVERY 5 MIN PRN
Status: DISCONTINUED | OUTPATIENT
Start: 2023-08-11 | End: 2023-08-11 | Stop reason: HOSPADM

## 2023-08-11 RX ORDER — HYDROMORPHONE HCL IN WATER/PF 6 MG/30 ML
0.2 PATIENT CONTROLLED ANALGESIA SYRINGE INTRAVENOUS EVERY 4 HOURS PRN
Status: DISCONTINUED | OUTPATIENT
Start: 2023-08-11 | End: 2023-08-13

## 2023-08-11 RX ORDER — KETOROLAC TROMETHAMINE 15 MG/ML
15 INJECTION, SOLUTION INTRAMUSCULAR; INTRAVENOUS
Status: DISCONTINUED | OUTPATIENT
Start: 2023-08-11 | End: 2023-08-11 | Stop reason: HOSPADM

## 2023-08-11 RX ORDER — PROPOFOL 10 MG/ML
INJECTION, EMULSION INTRAVENOUS PRN
Status: DISCONTINUED | OUTPATIENT
Start: 2023-08-11 | End: 2023-08-11

## 2023-08-11 RX ORDER — DEXAMETHASONE SODIUM PHOSPHATE 4 MG/ML
INJECTION, SOLUTION INTRA-ARTICULAR; INTRALESIONAL; INTRAMUSCULAR; INTRAVENOUS; SOFT TISSUE PRN
Status: DISCONTINUED | OUTPATIENT
Start: 2023-08-11 | End: 2023-08-11

## 2023-08-11 RX ORDER — FLUMAZENIL 0.1 MG/ML
0.2 INJECTION, SOLUTION INTRAVENOUS
Status: ACTIVE | OUTPATIENT
Start: 2023-08-11 | End: 2023-08-12

## 2023-08-11 RX ORDER — ONDANSETRON 2 MG/ML
INJECTION INTRAMUSCULAR; INTRAVENOUS PRN
Status: DISCONTINUED | OUTPATIENT
Start: 2023-08-11 | End: 2023-08-11

## 2023-08-11 RX ORDER — DOCUSATE SODIUM 100 MG/1
100 CAPSULE, LIQUID FILLED ORAL 2 TIMES DAILY
Status: DISCONTINUED | OUTPATIENT
Start: 2023-08-11 | End: 2023-08-13 | Stop reason: HOSPADM

## 2023-08-11 RX ORDER — DEXAMETHASONE SODIUM PHOSPHATE 4 MG/ML
4 INJECTION, SOLUTION INTRA-ARTICULAR; INTRALESIONAL; INTRAMUSCULAR; INTRAVENOUS; SOFT TISSUE
Status: DISCONTINUED | OUTPATIENT
Start: 2023-08-11 | End: 2023-08-11 | Stop reason: HOSPADM

## 2023-08-11 RX ORDER — ONDANSETRON 4 MG/1
4 TABLET, ORALLY DISINTEGRATING ORAL EVERY 6 HOURS PRN
Status: DISCONTINUED | OUTPATIENT
Start: 2023-08-11 | End: 2023-08-13 | Stop reason: HOSPADM

## 2023-08-11 RX ORDER — PROCHLORPERAZINE 25 MG
25 SUPPOSITORY, RECTAL RECTAL EVERY 12 HOURS PRN
Status: DISCONTINUED | OUTPATIENT
Start: 2023-08-11 | End: 2023-08-13 | Stop reason: HOSPADM

## 2023-08-11 RX ORDER — PANTOPRAZOLE SODIUM 40 MG/1
40 TABLET, DELAYED RELEASE ORAL 2 TIMES DAILY
Status: DISCONTINUED | OUTPATIENT
Start: 2023-08-11 | End: 2023-08-13 | Stop reason: HOSPADM

## 2023-08-11 RX ORDER — HYDRALAZINE HYDROCHLORIDE 20 MG/ML
2.5-5 INJECTION INTRAMUSCULAR; INTRAVENOUS EVERY 10 MIN PRN
Status: DISCONTINUED | OUTPATIENT
Start: 2023-08-11 | End: 2023-08-11 | Stop reason: HOSPADM

## 2023-08-11 RX ORDER — SODIUM CHLORIDE, SODIUM LACTATE, POTASSIUM CHLORIDE, CALCIUM CHLORIDE 600; 310; 30; 20 MG/100ML; MG/100ML; MG/100ML; MG/100ML
INJECTION, SOLUTION INTRAVENOUS CONTINUOUS
Status: DISCONTINUED | OUTPATIENT
Start: 2023-08-11 | End: 2023-08-11 | Stop reason: HOSPADM

## 2023-08-11 RX ORDER — OCTREOTIDE ACETATE 50 UG/ML
50 INJECTION, SOLUTION INTRAVENOUS; SUBCUTANEOUS ONCE
Status: COMPLETED | OUTPATIENT
Start: 2023-08-11 | End: 2023-08-11

## 2023-08-11 RX ORDER — LIDOCAINE HYDROCHLORIDE 20 MG/ML
INJECTION, SOLUTION INFILTRATION; PERINEURAL PRN
Status: DISCONTINUED | OUTPATIENT
Start: 2023-08-11 | End: 2023-08-11

## 2023-08-11 RX ORDER — FENTANYL CITRATE 50 UG/ML
25 INJECTION, SOLUTION INTRAMUSCULAR; INTRAVENOUS EVERY 5 MIN PRN
Status: DISCONTINUED | OUTPATIENT
Start: 2023-08-11 | End: 2023-08-11 | Stop reason: HOSPADM

## 2023-08-11 RX ORDER — HYDROMORPHONE HCL IN WATER/PF 6 MG/30 ML
0.4 PATIENT CONTROLLED ANALGESIA SYRINGE INTRAVENOUS EVERY 4 HOURS PRN
Status: DISCONTINUED | OUTPATIENT
Start: 2023-08-11 | End: 2023-08-13

## 2023-08-11 RX ORDER — ONDANSETRON 2 MG/ML
4 INJECTION INTRAMUSCULAR; INTRAVENOUS EVERY 6 HOURS PRN
Status: DISCONTINUED | OUTPATIENT
Start: 2023-08-11 | End: 2023-08-13 | Stop reason: HOSPADM

## 2023-08-11 RX ORDER — PROCHLORPERAZINE MALEATE 5 MG
10 TABLET ORAL EVERY 6 HOURS PRN
Status: DISCONTINUED | OUTPATIENT
Start: 2023-08-11 | End: 2023-08-13 | Stop reason: HOSPADM

## 2023-08-11 RX ORDER — FENTANYL CITRATE 50 UG/ML
INJECTION, SOLUTION INTRAMUSCULAR; INTRAVENOUS PRN
Status: DISCONTINUED | OUTPATIENT
Start: 2023-08-11 | End: 2023-08-11

## 2023-08-11 RX ADMIN — ONDANSETRON 4 MG: 2 INJECTION INTRAMUSCULAR; INTRAVENOUS at 14:28

## 2023-08-11 RX ADMIN — PROCHLORPERAZINE EDISYLATE 10 MG: 5 INJECTION INTRAMUSCULAR; INTRAVENOUS at 18:52

## 2023-08-11 RX ADMIN — CEFTRIAXONE 1 G: 1 INJECTION, POWDER, FOR SOLUTION INTRAMUSCULAR; INTRAVENOUS at 02:45

## 2023-08-11 RX ADMIN — DEXTROSE MONOHYDRATE 25 ML: 25 INJECTION, SOLUTION INTRAVENOUS at 12:54

## 2023-08-11 RX ADMIN — GLYCOPYRROLATE 0.2 MG: 0.2 INJECTION, SOLUTION INTRAMUSCULAR; INTRAVENOUS at 14:24

## 2023-08-11 RX ADMIN — OXYCODONE HYDROCHLORIDE 5 MG: 5 TABLET ORAL at 18:52

## 2023-08-11 RX ADMIN — OCTREOTIDE ACETATE 50 MCG/HR: 200 INJECTION, SOLUTION INTRAVENOUS; SUBCUTANEOUS at 03:17

## 2023-08-11 RX ADMIN — OXYCODONE HYDROCHLORIDE 5 MG: 5 TABLET ORAL at 04:40

## 2023-08-11 RX ADMIN — PROPOFOL 150 MG: 10 INJECTION, EMULSION INTRAVENOUS at 14:24

## 2023-08-11 RX ADMIN — OXYCODONE HYDROCHLORIDE 5 MG: 5 TABLET ORAL at 09:53

## 2023-08-11 RX ADMIN — Medication 100 MG: at 14:24

## 2023-08-11 RX ADMIN — HYDROMORPHONE HYDROCHLORIDE 0.4 MG: 0.2 INJECTION, SOLUTION INTRAMUSCULAR; INTRAVENOUS; SUBCUTANEOUS at 11:18

## 2023-08-11 RX ADMIN — HYDROMORPHONE HYDROCHLORIDE 0.4 MG: 0.2 INJECTION, SOLUTION INTRAMUSCULAR; INTRAVENOUS; SUBCUTANEOUS at 06:34

## 2023-08-11 RX ADMIN — FENTANYL CITRATE 50 MCG: 50 INJECTION, SOLUTION INTRAMUSCULAR; INTRAVENOUS at 15:24

## 2023-08-11 RX ADMIN — SODIUM CHLORIDE 8 MG/HR: 9 INJECTION, SOLUTION INTRAVENOUS at 08:47

## 2023-08-11 RX ADMIN — ONDANSETRON 4 MG: 2 INJECTION INTRAMUSCULAR; INTRAVENOUS at 11:19

## 2023-08-11 RX ADMIN — ONDANSETRON 4 MG: 2 INJECTION INTRAMUSCULAR; INTRAVENOUS at 14:57

## 2023-08-11 RX ADMIN — ONDANSETRON 4 MG: 2 INJECTION INTRAMUSCULAR; INTRAVENOUS at 16:49

## 2023-08-11 RX ADMIN — PROPOFOL 50 MG: 10 INJECTION, EMULSION INTRAVENOUS at 14:39

## 2023-08-11 RX ADMIN — OCTREOTIDE ACETATE 50 MCG: 50 INJECTION, SOLUTION INTRAVENOUS; SUBCUTANEOUS at 03:17

## 2023-08-11 RX ADMIN — HYDROMORPHONE HYDROCHLORIDE 0.4 MG: 0.2 INJECTION, SOLUTION INTRAMUSCULAR; INTRAVENOUS; SUBCUTANEOUS at 02:01

## 2023-08-11 RX ADMIN — HYDROMORPHONE HYDROCHLORIDE 0.4 MG: 0.2 INJECTION, SOLUTION INTRAMUSCULAR; INTRAVENOUS; SUBCUTANEOUS at 20:55

## 2023-08-11 RX ADMIN — SODIUM CHLORIDE, POTASSIUM CHLORIDE, SODIUM LACTATE AND CALCIUM CHLORIDE: 600; 310; 30; 20 INJECTION, SOLUTION INTRAVENOUS at 16:24

## 2023-08-11 RX ADMIN — PANTOPRAZOLE SODIUM 40 MG: 40 TABLET, DELAYED RELEASE ORAL at 20:55

## 2023-08-11 RX ADMIN — LIDOCAINE HYDROCHLORIDE 50 MG: 20 INJECTION, SOLUTION INFILTRATION; PERINEURAL at 14:24

## 2023-08-11 RX ADMIN — DOCUSATE SODIUM 100 MG: 100 CAPSULE, LIQUID FILLED ORAL at 20:55

## 2023-08-11 RX ADMIN — SODIUM CHLORIDE, POTASSIUM CHLORIDE, SODIUM LACTATE AND CALCIUM CHLORIDE: 600; 310; 30; 20 INJECTION, SOLUTION INTRAVENOUS at 13:29

## 2023-08-11 RX ADMIN — DEXAMETHASONE SODIUM PHOSPHATE 8 MG: 4 INJECTION, SOLUTION INTRA-ARTICULAR; INTRALESIONAL; INTRAMUSCULAR; INTRAVENOUS; SOFT TISSUE at 14:24

## 2023-08-11 RX ADMIN — ONDANSETRON 4 MG: 2 INJECTION INTRAMUSCULAR; INTRAVENOUS at 05:13

## 2023-08-11 RX ADMIN — ONDANSETRON 4 MG: 2 INJECTION INTRAMUSCULAR; INTRAVENOUS at 00:04

## 2023-08-11 RX ADMIN — HYDROMORPHONE HYDROCHLORIDE 0.4 MG: 0.2 INJECTION, SOLUTION INTRAMUSCULAR; INTRAVENOUS; SUBCUTANEOUS at 16:49

## 2023-08-11 RX ADMIN — FENTANYL CITRATE 50 MCG: 50 INJECTION, SOLUTION INTRAMUSCULAR; INTRAVENOUS at 15:36

## 2023-08-11 RX ADMIN — FENTANYL CITRATE 100 MCG: 50 INJECTION, SOLUTION INTRAMUSCULAR; INTRAVENOUS at 14:24

## 2023-08-11 ASSESSMENT — ACTIVITIES OF DAILY LIVING (ADL)
ADLS_ACUITY_SCORE: 32
ADLS_ACUITY_SCORE: 31
ADLS_ACUITY_SCORE: 32
ADLS_ACUITY_SCORE: 35
ADLS_ACUITY_SCORE: 32
ADLS_ACUITY_SCORE: 31
ADLS_ACUITY_SCORE: 35
ADLS_ACUITY_SCORE: 32
ADLS_ACUITY_SCORE: 35
ADLS_ACUITY_SCORE: 35

## 2023-08-11 NOTE — PLAN OF CARE
Goal Outcome Evaluation:      Plan of Care Reviewed With: patient    Overall Patient Progress: improvingOverall Patient Progress: improving    /67 (BP Location: Right arm)   Pulse 85   Temp 98.3  F (36.8  C) (Oral)   Resp 16   Wt 62.2 kg (137 lb 3.2 oz)   SpO2 100%   BMI 22.14 kg/m      On RA.    PRIMARY DIAGNOSIS: GI BLEED    OUTPATIENT/OBSERVATION GOALS TO BE MET BEFORE DISCHARGE  Orthostatic performed: N/A    Stable Hgb No, trending downward.   Recent Labs   Lab Test 08/11/23  0720 08/10/23  2156 06/19/23  0746   HGB 9.4* 10.9* 9.0*       Resolved or declined bleeding episodes: Yes Last episode: PTA to floor.     Appropriate testing complete: No, EGD today.     Cleared for discharge by consultants (if involved): No, GI following. EGD today.    Safe discharge environment identified: Yes    Discharge Planner Nurse   Safe discharge environment identified: Yes  Barriers to discharge: Yes       Entered by: Nga Wolf RN 08/11/2023 12:11 PM     Please review provider order for any additional goals.   Nurse to notify provider when observation goals have been met and patient is ready for discharge.

## 2023-08-11 NOTE — UTILIZATION REVIEW
Admission Status; Secondary Review Determination         Under the authority of the Utilization Management Committee, the utilization review process indicated a secondary review on the above patient.  The review outcome is based on review of the medical records, discussions with staff, and applying clinical experience noted on the date of the review.        (x)      Inpatient Status Appropriate - This patient's medical care is consistent with medical management for inpatient care and reasonable inpatient medical practice.     RATIONALE FOR DETERMINATION   The patient is a 27-year-old female admitted on 8/10/2023.  She has a history of alcoholic cirrhosis and continues alcohol abuse.  She came to the ED and was admitted because of hematemesis and explosive dark melanotic stool with right upper quadrant abdominal pain.  She is currently visiting from [place].  She was thought to have a Rupali-Garland tear during an admission in June 2023.  She relapsed with alcohol intake 2 days ago.  She is being taken for endoscopy after GI consultation.  Concern is for possible esophageal varices.  She was supposed to have a EGD done after discharge in June.  She has positive serum ketones consistent with starvation.  Based on severity of acute GI bleed and alcoholic cirrhosis with possible varices and need for endoscopy, recommend that she be advanced from observation to inpatient status.  Anna Carrasquillo MD will be notified via [employer] text of this recommendation.  Current hemoglobin is 9.4 and platelets of going down to 80.      The severity of illness, intensity of service provided, expected LOS and risk for adverse outcome make the care complex, high risk and appropriate for hospital admission.        The information on this document is developed by the utilization review team in order for the business office to ensure compliance.  This only denotes the appropriateness of proper admission status and does not reflect  the quality of care rendered.         The definitions of Inpatient Status and Observation Status used in making the determination above are those provided in the CMS Coverage Manual, Chapter 1 and Chapter 6, section 70.4.      Sincerely,     Marco Kent MD  Physician Advisor  Utilization Review/ Case Management  [facility].

## 2023-08-11 NOTE — PLAN OF CARE
Goal Outcome Evaluation:      Plan of Care Reviewed With: patient    Overall Patient Progress: improvingOverall Patient Progress: improving    PRIMARY DIAGNOSIS: GI BLEED    OUTPATIENT/OBSERVATION GOALS TO BE MET BEFORE DISCHARGE  Orthostatic performed: N/A    Stable Hgb No, trending downward.   Recent Labs   Lab Test 08/11/23  0720 08/10/23  2156 06/19/23  0746   HGB 9.4* 10.9* 9.0*       Resolved or declined bleeding episodes: Yes Last episode: PTA    Appropriate testing complete: No, scheduled for EGD this afternoon.     Cleared for discharge by consultants (if involved): No    Safe discharge environment identified: Yes    Discharge Planner Nurse   Safe discharge environment identified: Yes  Barriers to discharge: Yes       Entered by: Nga Wolf RN 08/11/2023 8:59 AM     Please review provider order for any additional goals.   Nurse to notify provider when observation goals have been met and patient is ready for discharge.

## 2023-08-11 NOTE — PLAN OF CARE
Patient Transfer Information  Patient connected to monitoring equipment on arrival: N/A     Patient connected to wall oxygen on arrival: N/A    Belongings: Transferred with patient    Safety check completed: Yes

## 2023-08-11 NOTE — ED TRIAGE NOTES
Black stool and coffee ground emesis since 1000. Has happened before, about 2 months ago. Was hospitalized for this in the past. Lightheaded. Severe abdominal pain.

## 2023-08-11 NOTE — CARE PLAN
Patient Transfer Information  Patient connected to monitoring equipment on arrival: yes Capnography     Patient connected to wall oxygen on arrival: N/A    Belongings: No belongings present    Safety check completed: Yes

## 2023-08-11 NOTE — ANESTHESIA POSTPROCEDURE EVALUATION
Patient: Tanika Chau    Procedure: Procedure(s):  ESOPHAGOGASTRODUODENOSCOPY with banding x 5 bands       Anesthesia Type:  General    Note:  Disposition: Inpatient   Postop Pain Control: Uneventful            Sign Out: Well controlled pain   PONV: No   Neuro/Psych: Uneventful            Sign Out: Acceptable/Baseline neuro status   Airway/Respiratory: Uneventful            Sign Out: Acceptable/Baseline resp. status   CV/Hemodynamics: Uneventful            Sign Out: Acceptable CV status; No obvious hypovolemia; No obvious fluid overload   Other NRE: NONE   DID A NON-ROUTINE EVENT OCCUR? No           Last vitals:  Vitals Value Taken Time   /98 08/11/23 1536   Temp 97.4  F (36.3  C) 08/11/23 1500   Pulse 84 08/11/23 1538   Resp 8 08/11/23 1538   SpO2 100 % 08/11/23 1532   Vitals shown include unvalidated device data.    Electronically Signed By: Uri Austin MD  August 11, 2023  3:39 PM

## 2023-08-11 NOTE — ANESTHESIA PREPROCEDURE EVALUATION
Anesthesia Pre-Procedure Evaluation    Patient: Tanika Chau   MRN: [id number] : 1995        Procedure : Procedure(s):  ESOPHAGOGASTRODUODENOSCOPY          Past Medical History:   Diagnosis Date    Alcohol abuse     Alcoholic cirrhosis of liver     Anxiety     Gastric diverticulum     Noted on EGD       Past Surgical History:   Procedure Laterality Date    ENDOSCOPIC RETROGRADE CHOLANGIOPANCREATOGRAM N/A 2019    Procedure: ENDOSCOPIC RETROGRADE CHOLANGIOPANCREATOGRAPHY AND PANCREATIC STENT PLACEMENT;  Surgeon: Mak Golden MD;  Location: [facility];  Service: Gastroenterology    TONSILLECTOMY, ADENOIDECTOMY, COMBINED        Allergies   Allergen Reactions    Acetaminophen Unknown     impaired liver function, Other reaction(s): Other (see comments), impaired liver function    Dilaudid [Hydromorphone] Nausea      Social History     Tobacco Use    Smoking status: Some Days     Types: Vaping Device    Smokeless tobacco: Never   Substance Use Topics    Alcohol use: Yes     Comment: trying to maintain sobriety but will occasionally relapse      Wt Readings from Last 1 Encounters:   23 62.2 kg (137 lb 3.2 oz)        Anesthesia Evaluation   Pt has had prior anesthetic. Type: General.        ROS/MED HX  ENT/Pulmonary:  - neg pulmonary ROS     Neurologic:  - neg neurologic ROS     Cardiovascular:  - neg cardiovascular ROS     METS/Exercise Tolerance:     Hematologic: Comments: Lab Test        08/11/23     08/11/23     08/10/23     06/19/23     06/18/23     06/17/23     06/17/23     08/17/20     09/25/19                       1206          0720          2156          0746          0614          0604          0401          0128          1058          WBC           --          5.4          7.0          4.5          4.1           --          6.7            < >        5.0           HGB          9.6*         9.4*         10.9*        9.0*         9.6*  9.6*    < >        11.4*          < >         14.0          MCV           --          103*         101*         111*         111*          --          110*           < >        100           PLT           --          80*          112*         114*         113*          --          147*           < >        256           INR           --           --           --           --          1.29*         --          1.21*         --          1.07           < > = values in this interval not displayed.                  Lab Test        08/11/23     08/10/23     06/19/23                       0720          2156          0746          NA           139          140          139           POTASSIUM    3.7          3.8          3.7           CHLORIDE     99           97*          105           CO2          23           26           26            BUN          11.1         12.3         5.0*          CR           0.42*        0.37*        0.50*         ANIONGAP     17*          17*          8             NORA          8.3*         9.7          8.5*          GLC          55*          86           99                (+)      anemia,          Musculoskeletal:  - neg musculoskeletal ROS     GI/Hepatic: Comment: Hematemesis with nausea    Alcoholic cirrhosis, unspecified whether ascites present       (+)           hepatitis  liver disease,       Renal/Genitourinary:  - neg Renal ROS     Endo:  - neg endo ROS     Psychiatric/Substance Use:     (+)   alcohol abuse      Infectious Disease:  - neg infectious disease ROS     Malignancy:  - neg malignancy ROS     Other:  - neg other ROS          Physical Exam    Airway        Mallampati: II   TM distance: > 3 FB   Neck ROM: full   Mouth opening: > 3 cm    Respiratory Devices and Support         Dental           Cardiovascular   cardiovascular exam normal          Pulmonary   pulmonary exam normal                OUTSIDE LABS:  CBC:   Lab Results   Component Value Date    WBC 5.4 08/11/2023    WBC 7.0 08/10/2023    HGB 9.6 (L) 08/11/2023     HGB 9.4 (L) 08/11/2023    HCT 29.0 (L) 08/11/2023    HCT 32.7 (L) 08/10/2023    PLT 80 (L) 08/11/2023     (L) 08/10/2023     BMP:   Lab Results   Component Value Date     08/11/2023     08/10/2023    POTASSIUM 3.7 08/11/2023    POTASSIUM 3.8 08/10/2023    CHLORIDE 99 08/11/2023    CHLORIDE 97 (L) 08/10/2023    CO2 23 08/11/2023    CO2 26 08/10/2023    BUN 11.1 08/11/2023    BUN 12.3 08/10/2023    CR 0.42 (L) 08/11/2023    CR 0.37 (L) 08/10/2023    GLC 55 (L) 08/11/2023    GLC 86 08/10/2023     COAGS:   Lab Results   Component Value Date    INR 1.29 (H) 06/18/2023     POC:   Lab Results   Component Value Date    HCG Negative 09/26/2019    HCGS Negative 08/10/2023     HEPATIC:   Lab Results   Component Value Date    ALBUMIN 3.5 08/11/2023    PROTTOTAL 6.8 08/11/2023    ALT 24 08/11/2023     (H) 08/11/2023    ALKPHOS 241 (H) 08/11/2023    BILITOTAL 3.3 (H) 08/11/2023    SHERYL 59 (H) 08/14/2019     OTHER:   Lab Results   Component Value Date    LACT 1.5 08/10/2023    NORA 8.3 (L) 08/11/2023    PHOS 4.0 06/17/2023    MAG 1.5 (L) 08/10/2023    LIPASE 37 08/10/2023    CRP <0.1 08/17/2020       Anesthesia Plan    ASA Status:  3       Anesthesia Type: General.     - Airway: ETT   Induction: Intravenous, Propofol.   Maintenance: Balanced.        Consents    Anesthesia Plan(s) and associated risks, benefits, and realistic alternatives discussed. Questions answered and patient/representative(s) expressed understanding.     - Discussed:     - Discussed with:  Patient      - Extended Intubation/Ventilatory Support Discussed: No.      - Patient is DNR/DNI Status: No     Use of blood products discussed: No .     Postoperative Care    Pain management: IV analgesics.   PONV prophylaxis: Ondansetron (or other 5HT-3), Dexamethasone or Solumedrol     Comments:                Romie Lema MD

## 2023-08-11 NOTE — ED NOTES
[facility]  ED Nurse Handoff Report    ED Chief complaint: Hematemesis and Rectal Bleeding  . ED Diagnosis:   Final diagnoses:   None       Allergies:   Allergies   Allergen Reactions    Acetaminophen Unknown     impaired liver function, Other reaction(s): Other (see comments), impaired liver function    Dilaudid [Hydromorphone] Nausea       Code Status: Full Code    Activity level - Baseline/Home:  independent.  Activity Level - Current:   independent.   Lift room needed: No.   Bariatric: No   Needed: No   Isolation: no  Infection: Not Applicable.     Respiratory status: Room air    Vital Signs (within 30 minutes):   Vitals:    08/10/23 2219 08/10/23 2230 08/10/23 2235 08/10/23 2300   BP: 117/82 111/73 111/73 110/78   Pulse: 92 93  95   Resp: 17      Temp:       SpO2: 96% 98% 97% 98%       Cardiac Rhythm:  ,      Pain level:    Patient confused: No.   Patient Falls Risk: toileting schedule implemented.   Elimination Status: Has voided     Patient Report - Initial Complaint: Pt has Black stool since yesterday and start to have large volume of coffee ground emesis since 1000. Has happened before, about 2 months ago. Was hospitalized for this in the past. Feeling Lightheaded. Severe abdominal pain around epigatric area .   Focused Assessment: epigastric pain with nauseous      Abnormal Results:   Labs Ordered and Resulted from Time of ED Arrival to Time of ED Departure   COMPREHENSIVE METABOLIC PANEL - Abnormal       Result Value    Sodium 140      Potassium 3.8      Chloride 97 (*)     Carbon Dioxide (CO2) 26      Anion Gap 17 (*)     Urea Nitrogen 12.3      Creatinine 0.37 (*)     Calcium 9.7      Glucose 86      Alkaline Phosphatase 306 (*)      (*)     ALT 32      Protein Total 8.2      Albumin 4.0      Bilirubin Total 4.1 (*)     GFR Estimate >90     CBC WITH PLATELETS AND DIFFERENTIAL - Abnormal    WBC Count 7.0      RBC Count 3.23 (*)     Hemoglobin 10.9 (*)      Hematocrit 32.7 (*)      (*)     MCH 33.7 (*)     MCHC 33.3      RDW 17.9 (*)     Platelet Count 112 (*)     % Neutrophils 76      % Lymphocytes 13      % Monocytes 9      % Eosinophils 0      % Basophils 1      % Immature Granulocytes 1      NRBCs per 100 WBC 0      Absolute Neutrophils 5.3      Absolute Lymphocytes 0.9      Absolute Monocytes 0.7      Absolute Eosinophils 0.0      Absolute Basophils 0.1      Absolute Immature Granulocytes 0.1      Absolute NRBCs 0.0     MAGNESIUM - Abnormal    Magnesium 1.5 (*)    HCG QUALITATIVE PREGNANCY - Normal    hCG Serum Qualitative Negative     LIPASE - Normal    Lipase 37     LACTIC ACID WHOLE BLOOD - Normal    Lactic Acid 1.5     ETHYL ALCOHOL LEVEL - Normal    Alcohol ethyl <0.01     TYPE AND SCREEN, ADULT    ABO/RH(D) O POS      Antibody Screen Negative      SPECIMEN EXPIRATION DATE [id number]     ABO/RH TYPE AND SCREEN        No orders to display       Treatments provided: see MAR  Family Comments: na  OBS brochure/video discussed/provided to patient:  N/A  ED Medications:   Medications   0.9% sodium chloride BOLUS (1,000 mLs Intravenous $New Bag 8/10/23 2225)   ondansetron (ZOFRAN) injection 4 mg (4 mg Intravenous $Given 8/10/23 2224)   pantoprazole (PROTONIX) 80 mg in sodium chloride 0.9 % 100 mL infusion (8 mg/hr Intravenous $New Bag 8/10/23 2235)   magnesium sulfate 2 g in 50 mL sterile water intermittent infusion (has no administration in time range)   pantoprazole (PROTONIX) IV push injection 40 mg (40 mg Intravenous $Given 8/10/23 2224)   morphine (PF) injection 4 mg (4 mg Intravenous $Given 8/10/23 2230)       Drips infusing:  Yes, protonix 10ml/hr  For the majority of the shift this patient was Green.   Interventions performed were see MAR.    Sepsis treatment initiated: No    Cares/treatment/interventions/medications to be completed following ED care: Pt can have ice chip and otherwise NPO  Chronic alcoholic, sober for 28days, relapse on Tuesday,  had 5 drinks, neg CIWA score     ED Nurse Name: Theodore Florian RN  11:17 PM     RECEIVING UNIT ED HANDOFF REVIEW    Above ED Nurse Handoff Report was reviewed: Yes  Reviewed by: Theodora Neil RN on August 11, 2023 at 6:04 AM

## 2023-08-11 NOTE — ANESTHESIA CARE TRANSFER NOTE
Patient: Tanika Chau    Procedure: Procedure(s):  ESOPHAGOGASTRODUODENOSCOPY with banding x 5 bands       Diagnosis: Hematemesis with nausea [K92.0]  Alcoholic cirrhosis, unspecified whether ascites present (H) [K70.30]  Anemia due to blood loss, acute [D62]  Diagnosis Additional Information: No value filed.    Anesthesia Type:   General     Note:    Oropharynx: oropharynx clear of all foreign objects and spontaneously breathing  Level of Consciousness: awake  Oxygen Supplementation: face mask    Independent Airway: airway patency satisfactory and stable  Dentition: dentition unchanged  Vital Signs Stable: post-procedure vital signs reviewed and stable  Report to RN Given: handoff report given  Patient transferred to: PACU  Comments: Report to RN.  Handoff Report: Identifed the Patient, Identified the Reponsible Provider, Reviewed the pertinent medical history, Discussed the surgical course, Reviewed Intra-OP anesthesia mangement and issues during anesthesia, Set expectations for post-procedure period and Allowed opportunity for questions and acknowledgement of understanding      Vitals:  Vitals Value Taken Time   /108 08/11/23 1451   Temp     Pulse 119 08/11/23 1451   Resp 25 08/11/23 1452   SpO2 100 % 08/11/23 1453   Vitals shown include unvalidated device data.    Electronically Signed By: DIONICIO Tripp CRNA  August 11, 2023  2:54 PM

## 2023-08-11 NOTE — H&P
History and Physical     Tanika Chau MRN# [id number]   YOB: 1995 Age: 27 year old      Date of Admission:  8/10/2023    Primary care provider: No Ref-Primary, Physician          Assessment and Plan:     Summary of Stay: Tanika Chau is a 27 year old female with a history of alcoholic cirrhosis without e/o varices by EGD 2019, gastric diverticulum, not on any medications admitted on 8/10/2023 with n/v with hematemesis and dark stools and ruq abdominal pain    She is here from [place] visiting her boyfriend.    She was hospitalized here 6/17-6/19/2023 for hematemesis suspected due to MW tear.      She states she relapsed 2 days ago then woke up with a hangover yesterday.  At around 1030 am she had a dark stool with coffee like splatter that she was concerned was blood.  This was followed by multiple rounds of vomiting, initially her vomit was bright red in color then changing to dark brown with mucoid clots.  She noted bilateral abdominal pain described as burning in nature.  She rarely takes ibuprofen but took some yesterday.  She denies any fevers    ER eval VS stable and she is afebrile   CMP with Alk phos 306 bili 4.1, AST//32, gap is 17  CBC with macrocytic anemia 11 range, and chronic thrombocytopenia 100's    Exam is notable for ruq and epigastric ttp     Problem List:   Hematemesis  Started with bright red blood so desmond vaughan tear seems less likely.  Last EGD 2019 and did not show any e/o of varices at that time. She was noted to have a gastric diverticulum.  At discharge it was recommended that she follow-up with [facility] for EGD but apparently that never happened. Suspect gastritis although may have developed varices by now   -PPI/Octreotide drip   -[facility] consultation  -serial hgb, check T and C   -empiric ceftriaxone     RUQ abdominal pain with elevated alk phos and bili  Alk phos and bili up compared with baseline and with ruq abdominal pain so AUS ordered and GB is negative  but liver with cirrhotic features and steatohepatitis so wonder about inflammatory enlargement and visceral  pain   -prn low dose oxy and hydromorphone ordered for discomfort    Elevated AG with normal CO2  Likely starvation ketosis  -ck ketones    Cirrhosis alcoholic with small amount of ascites   Alcohol misuse  Reports being sober for 28 days then fell relapsed 2 days pta.  She's aware that this damages her already vulnerable liver.  She reports a good support network in [place] with family and friends, but also attends meetings regularly and has a sponsor    Thrombocytopenia/Macrocytic anemia  Chronic, likely due to liver disease, stable     Hypomag  Replacement protocol    DVT Prophylaxis: Ambulate every shift  Code Status: Full Code  Functional Status: independent  Vegas: not needed  Access:   PIV            Time spent 65 minutes reviewing epic including notes/labs/prior hx, current medications.  In addition to interviewing and examining the patient, updated patient and family regarding plan of care          Chief Complaint:     Hematemesis        History of Present Illness:   Tanika Chau is a 27 year old female with a history of alcoholic cirrhosis without e/o varices by EGD 2019, gastric diverticulum, not on any medications admitted on 8/10/2023 with n/v with hematemesis and dark stools and ruq abdominal pain    She is here from [place] visiting her boyfriend.    She was hospitalized here 6/17-6/19/2023 for hematemesis suspected due to MW tear.      She states she relapsed 2 days ago then woke up with a hangover yesterday.  At around 1030 am she had a dark stool with coffee like splatter that she was concerned was blood.  This was followed by multiple rounds of vomiting, initially her vomit was bright red in color then changing to dark brown with mucoid clots.  She noted bilateral abdominal pain described as burning in nature.  She rarely takes ibuprofen but took some yesterday.  She denies any  fevers    ER eval VS stable and she is afebrile   CMP with Alk phos 306 bili 4.1, AST//32, gap is 17  CBC with macrocytic anemia 11 range, and chronic thrombocytopenia 100's    Exam is notable for ruq and epigastric ttp       The history is obtained in discussion with the ER provider Praveen Appiah MD and the patient with good reliability      Epic and Care everywhere were extensively reviewed        Past Medical History:     Past Medical History:   Diagnosis Date    Alcohol abuse     Alcoholic cirrhosis of liver     Anxiety     Gastric diverticulum     Noted on EGD 2019             Past Surgical History:     Past Surgical History:   Procedure Laterality Date    ENDOSCOPIC RETROGRADE CHOLANGIOPANCREATOGRAM N/A 09/26/2019    Procedure: ENDOSCOPIC RETROGRADE CHOLANGIOPANCREATOGRAPHY AND PANCREATIC STENT PLACEMENT;  Surgeon: Mak Golden MD;  Location: [facility];  Service: Gastroenterology    TONSILLECTOMY, ADENOIDECTOMY, COMBINED               Social History:     Social History     Tobacco Use    Smoking status: Some Days     Types: Vaping Device    Smokeless tobacco: Never   Substance Use Topics    Alcohol use: Yes     Comment: trying to maintain sobriety but will occasionally relapse             Family History:   I have reviewed this patient's family history         Allergies:     Allergies   Allergen Reactions    Acetaminophen Unknown     impaired liver function, Other reaction(s): Other (see comments), impaired liver function    Dilaudid [Hydromorphone] Nausea             Medications:   She states no medications        Review of Systems:     A Comprehensive greater than 10 system review of systems was carried out.  Pertinent positives and negatives are noted above.  Otherwise negative for contributory information.           Physical Exam:   Blood pressure 116/77, pulse 91, temperature 98.3  F (36.8  C), resp. rate 17, SpO2 98 %.  Exam:    General:  Pleasant nad looks stated age  HEENT:   Head nc/at sclera clear PERRL O/P:  Moist mucus membranes no posterior pharyngeal erythema or exudate.  Neck is supple  Lungs: cta b nl effort   CV:  RRR no m/r/g no le edema  Abd:  soft but with discrete pain ruq and epigastric region with guarding   Neuro:  Cn 2-12 grossly intact and quijano  Alert and oriented affect appropriate   Skin:  W/d no c/c               Data:     Results for orders placed or performed during the hospital encounter of 08/10/23   US Abdomen Limited     Status: None (Preliminary result)    Narrative    EXAM: US ABDOMEN LIMITED  LOCATION: [facility]  DATE: 8/11/2023    INDICATION: Hematemesis.  COMPARISON: 06/17/2023.  TECHNIQUE: Limited abdominal ultrasound.    FINDINGS:    GALLBLADDER: Normal. No gallstones, wall thickening, or pericholecystic fluid. Negative sonographic Mckeon's sign.    BILE DUCTS: No biliary dilatation. The common duct measures 4 mm.    LIVER: Fatty infiltration of the liver. There is also heterogeneous hepatic echotexture with nodular contour compatible with underlying hepatic parenchymal disease. No focal hepatic lesions.    RIGHT KIDNEY: No hydronephrosis.    PANCREAS: The visualized portions are normal.    Trace amount of ascites in the right upper quadrant.      Impression    IMPRESSION:  1.  Gallbladder is negative. No biliary dilatation.    2.  Cirrhotic appearance of the liver with diffuse fatty infiltration.    3.  Trace amount of ascites in the right upper quadrant.   Comprehensive metabolic panel     Status: Abnormal   Result Value Ref Range    Sodium 140 136 - 145 mmol/L    Potassium 3.8 3.4 - 5.3 mmol/L    Chloride 97 (L) 98 - 107 mmol/L    Carbon Dioxide (CO2) 26 22 - 29 mmol/L    Anion Gap 17 (H) 7 - 15 mmol/L    Urea Nitrogen 12.3 6.0 - 20.0 mg/dL    Creatinine 0.37 (L) 0.51 - 0.95 mg/dL    Calcium 9.7 8.6 - 10.0 mg/dL    Glucose 86 70 - 99 mg/dL    Alkaline Phosphatase 306 (H) 35 - 104 U/L     (H) 0 - 45 U/L    ALT 32 0 - 50  U/L    Protein Total 8.2 6.4 - 8.3 g/dL    Albumin 4.0 3.5 - 5.2 g/dL    Bilirubin Total 4.1 (H) <=1.2 mg/dL    GFR Estimate >90 >60 mL/min/1.73m2   Extra Tube ([facility] Draw)     Status: None    Narrative    The following orders were created for panel order Extra Tube ([facility] Draw).  Procedure                               Abnormality         Status                     ---------                               -----------         ------                     Extra Blue Top Tube[[id number]]                              Final result                 Please view results for these tests on the individual orders.   HCG QUALitative pregnancy (blood)     Status: Normal   Result Value Ref Range    hCG Serum Qualitative Negative Negative   CBC with platelets and differential     Status: Abnormal   Result Value Ref Range    WBC Count 7.0 4.0 - 11.0 10e3/uL    RBC Count 3.23 (L) 3.80 - 5.20 10e6/uL    Hemoglobin 10.9 (L) 11.7 - 15.7 g/dL    Hematocrit 32.7 (L) 35.0 - 47.0 %     (H) 78 - 100 fL    MCH 33.7 (H) 26.5 - 33.0 pg    MCHC 33.3 31.5 - 36.5 g/dL    RDW 17.9 (H) 10.0 - 15.0 %    Platelet Count 112 (L) 150 - 450 10e3/uL    % Neutrophils 76 %    % Lymphocytes 13 %    % Monocytes 9 %    % Eosinophils 0 %    % Basophils 1 %    % Immature Granulocytes 1 %    NRBCs per 100 WBC 0 <1 /100    Absolute Neutrophils 5.3 1.6 - 8.3 10e3/uL    Absolute Lymphocytes 0.9 0.8 - 5.3 10e3/uL    Absolute Monocytes 0.7 0.0 - 1.3 10e3/uL    Absolute Eosinophils 0.0 0.0 - 0.7 10e3/uL    Absolute Basophils 0.1 0.0 - 0.2 10e3/uL    Absolute Immature Granulocytes 0.1 <=0.4 10e3/uL    Absolute NRBCs 0.0 10e3/uL   Extra Blue Top Tube     Status: None   Result Value Ref Range    Hold Specimen JIC    Lipase     Status: Normal   Result Value Ref Range    Lipase 37 13 - 60 U/L   Lactic acid whole blood     Status: Normal   Result Value Ref Range    Lactic Acid 1.5 0.7 - 2.0 mmol/L   Magnesium     Status: Abnormal   Result Value Ref Range    Magnesium  1.5 (L) 1.7 - 2.3 mg/dL   Ethyl Alcohol Level     Status: Normal   Result Value Ref Range    Alcohol ethyl <0.01 <=0.01 g/dL   Adult Type and Screen     Status: None   Result Value Ref Range    ABO/RH(D) O POS     Antibody Screen Negative Negative    SPECIMEN EXPIRATION DATE [id number]    CBC with Platelets & Differential     Status: Abnormal    Narrative    The following orders were created for panel order CBC with Platelets & Differential.  Procedure                               Abnormality         Status                     ---------                               -----------         ------                     CBC with platelets and d...[[id number]]  Abnormal            Final result                 Please view results for these tests on the individual orders.   ABO/Rh type and screen     Status: None    Narrative    The following orders were created for panel order ABO/Rh type and screen.  Procedure                               Abnormality         Status                     ---------                               -----------         ------                     Adult Type and Screen[[id number]]                            Edited Result - FINAL        Please view results for these tests on the individual orders.

## 2023-08-11 NOTE — CONSULTS
GASTROENTEROLOGY CONSULTATION      Tanika Chau  [address] [id number]  27 year old female     Admission Date/Time: 8/10/2023  Primary Care Provider: No Ref-Primary, Physician     We were asked to see the patient in consultation by Dr. Retana for evaluation of melena, hematemesis.    CC: hematemesis     HPI:  Tanika Chau is a 27 year old female with past medical history significant for alcohol abuse, ETOH hepatitis, presumed Rupali Garland tear/UGI bleed in June 2023, admitted 8/10 with symptoms of melena, hematemesis, abdominal pain with evidence of macrocytic anemia, thrombocytopenia, elevated LFTs/bilirubin, and ultrasound concerning for liver cirrhosis.     Patient was recently admitted in June 2023 with dark emesis and melena in the setting of binge drinking thought to be secondary to Rupali Garland tear. A follow up outpatient upper endoscopy was recommended but patient did not schedule. She reports taking twice daily PPI for 6 weeks after that. She was sober for 28 days and then had 5 white claws on 8/9. She had a BM that was initially brown but stool following that was explosive and black in color. Later, she had initially bright red bloody emesis followed by multiple emesis that were dark in color and just prior to coming to the hospital she thinks she saw blood clots. She has had 2 emesis this morning that were clear in the color. No further stools. She mentions diffuse abdominal pain but more focal tenderness in the epigastrium and RUQ. She has had some lightheadedness but no dizziness or SOB.     Had Aleve 2 days ago but otherwise denies NSAIDs. EGD in 2019 with [facility] for dyspepsia showed gastric diverticulum but was otherwise normal.  Of note, patient does live in [place] but visits frequently as her boyfriend lives here.    Labs on admission showed hemoglobin of 10.9, previously 9 on June 19 when she was admitted for upper GI bleed.  MCV elevated at 101, platelets low  112,000, normal white blood cell count 7.  CMP showed elevated total bilirubin at 4.1, elevated , normal ALT at 32, elevated alkaline phosphatase 306, normal creatinine 0.37, normal BUN 12.3, normal electrolytes.  Total bilirubin improved to 3.3 this morning and AST trending down at 125, alkaline phosphatase 241. Of note, hepatitis A, B, C serologies were negative in August 2019.    Right upper quadrant ultrasound showed fatty infiltration of the liver, heterogenous hepatic echotexture with nodular contour compatible with underlying liver cirrhosis, no focal liver lesions, trace ascites in the right upper quadrant, normal gallbladder without bile duct dilation.  This is similar to findings from ultrasound in June of this year.    PAST MEDICAL HISTORY:  Patient Active Problem List    Diagnosis Date Noted    Melena 08/10/2023     Priority: Medium    Alcohol abuse 08/10/2023     Priority: Medium    Anemia due to blood loss, acute 06/17/2023     Priority: Medium    Hematemesis with nausea 06/17/2023     Priority: Medium    Alcoholic cirrhosis, unspecified whether ascites present (H) 06/17/2023     Priority: Medium    Generalized anxiety disorder 08/25/2020     Priority: Medium    Transaminitis      Priority: Medium    Biliary obstruction 09/25/2019     Priority: Medium    Steatohepatitis      Priority: Medium    Elevated liver function tests      Priority: Medium    High anion gap metabolic acidosis      Priority: Medium          ROS: A comprehensive ten point review of systems was negative aside from those in mentioned in the HPI.       MEDICATIONS:   Prior to Admission medications    Medication Sig Start Date End Date Taking? Authorizing Provider   multivitamin therapeutic tablet [MULTIVITAMIN THERAPEUTIC TABLET] Take 1 tablet by mouth daily. 8/13/19   Provider, Historical   ondansetron (ZOFRAN ODT) 4 MG ODT tab Take 1 tablet (4 mg) by mouth every 6 hours as needed for nausea or vomiting 6/19/23   Juan Luis Callahan  A, DO   pantoprazole (PROTONIX) 40 MG EC tablet Take 1 tablet (40 mg) by mouth 2 times daily (before meals) for 60 days 6/19/23 8/18/23  Juan Luis Callahan DO        ALLERGIES:   Allergies   Allergen Reactions    Acetaminophen Unknown     impaired liver function, Other reaction(s): Other (see comments), impaired liver function    Dilaudid [Hydromorphone] Nausea        SOCIAL HISTORY:  Social History     Tobacco Use    Smoking status: Some Days     Types: Vaping Device    Smokeless tobacco: Never   Substance Use Topics    Alcohol use: Yes     Comment: trying to maintain sobriety but will occasionally relapse    Drug use: Never        FAMILY HISTORY:  No family history on file.     PHYSICAL EXAM:   /73 (BP Location: Right arm)   Pulse 86   Temp 97.6  F (36.4  C) (Oral)   Resp 18   Wt 62.2 kg (137 lb 3.2 oz)   SpO2 98%   BMI 22.14 kg/m       PHYSICAL EXAM:  General: alert, oriented, NAD  SKIN: +mild jaundice, no suspicious lesions, rashes, or spider angiomas  HEAD: Normocephalic. No masses, lesions, tenderness or abnormalities  NECK: Neck supple. No adenopathy. Thyroid symmetric, normal size.  EYES: No scleral icterus  ENT: ENT exam normal, no neck nodes or sinus tenderness  RESPIRATORY: negative, Good diaphragmatic excursion. Lungs clear  CARDIOVASCULAR: negative, PMI normal. No lifts, heaves, or thrills. RRR. No murmurs, clicks gallops or rub  GASTROINTESTINAL: +BS, soft, moderate epigastric/RUQ tenderness, diffuse mild tenderness ND, no HSM, no masses/guarding/rebound  JOINT/EXTREMITIES: extremities normal- no gross deformities noted, gait normal and normal muscle tone  NEURO: Reflexes grossly normal and symmetric. Sensation grossly WNL.  PSYCH: no abnormal anxiety/depression  LYMPH: No anterior cervical, posterior cervical, or supraclavicular adenopathy.  Rectal exam: normal, dark brown/green stool without overt blood present.      LABS:  I reviewed the patient's new clinical lab test results.   Recent  Labs   Lab Test 08/11/23  0720 08/10/23  2156 06/19/23  0746 06/18/23  0614 06/17/23  0604 06/17/23  0401 08/17/20  0128 09/25/19  1058   WBC 5.4 7.0 4.5 4.1  --  6.7   < > 5.0   HGB 9.4* 10.9* 9.0* 9.6*  9.6*   < > 11.4*   < > 14.0   * 101* 111* 111*  --  110*   < > 100   PLT 80* 112* 114* 113*  --  147*   < > 256   INR  --   --   --  1.29*  --  1.21*  --  1.07    < > = values in this interval not displayed.     Recent Labs   Lab Test 08/11/23  0720 08/10/23  2156 06/19/23  0746    140 139   POTASSIUM 3.7 3.8 3.7   CHLORIDE 99 97* 105   CO2 23 26 26   BUN 11.1 12.3 5.0*   ANIONGAP 17* 17* 8   NORA 8.3* 9.7 8.5*     Recent Labs   Lab Test 08/11/23  0720 08/10/23  2156 06/18/23  1509 06/18/23  0614 06/17/23  0401 09/07/20  1344 09/07/20  1344 08/17/20  0128 08/17/20  0106 08/14/19  0612 08/13/19  1405   ALBUMIN 3.5 4.0  --  3.8 4.4   < > 4.6   < >  --    < >  --    BILITOTAL 3.3* 4.1*  --  1.8* 2.4*  --  1.0   < >  --    < >  --    ALT 24 32  --  27 32  --  43   < >  --    < >  --    * 162*  --  107* 120*  --  60*   < >  --    < >  --    ALKPHOS 241* 306*  --  169* 247*  --  137*   < >  --    < >  --    PROTEIN  --   --  Negative  --   --   --   --   --  Negative  --  30 mg/dL*   LIPASE  --  37  --   --  36  --  <9   < >  --    < >  --     < > = values in this interval not displayed.        IMAGING  I personally reviewed the patient's new imaging results.    EXAM: US ABDOMEN LIMITED  LOCATION: [facility]  DATE: 8/11/2023     INDICATION: Hematemesis.  COMPARISON: 06/17/2023.  TECHNIQUE: Limited abdominal ultrasound.     FINDINGS:     GALLBLADDER: Normal. No gallstones, wall thickening, or pericholecystic fluid. Negative sonographic Mckeon's sign.     BILE DUCTS: No biliary dilatation. The common duct measures 4 mm.     LIVER: Fatty infiltration of the liver. There is also heterogeneous hepatic echotexture with nodular contour compatible with underlying hepatic parenchymal  disease. No focal hepatic lesions.     RIGHT KIDNEY: No hydronephrosis.     PANCREAS: The visualized portions are normal.     Trace amount of ascites in the right upper quadrant.                                                                      IMPRESSION:  1.  Gallbladder is negative. No biliary dilatation.     2.  Cirrhotic appearance of the liver with diffuse fatty infiltration.     3.  Trace amount of ascites in the right upper quadrant.     CONSULTATION ASSESSMENT AND PLAN:    27 year old female with past medical history significant for alcohol abuse, ETOH hepatitis, presumed Rupali Garland tear/UGI bleed in June 2023, admitted 8/10 with symptoms of melena, hematemesis, abdominal pain with evidence of macrocytic anemia, thrombocytopenia, elevated LFTs/bilirubin, and ultrasound concerning for liver cirrhosis.     Melena, hematemesis. Variceal bleed considered given alcohol use and evidence of liver cirrhosis. She did not have varices at the time of EGD in 2019. Differential includes PUD, Rupali Garland tear, AVMs, portal gastropathy/gastritis, and less likely malignancy. She has been started on octreotide and PPI drips. HGB at baseline this morning. Rectal exam shows dark brown/green stool.   --EGD today.  --Continue octreotide and PPI drip.   --Monitor HGB and transfuse prn.   --NPO.     2. ETOH hepatitis/Liver cirrhosis. MELD-na 12 in June (no updated INR).  Imaging suggestive of liver cirrhosis without any concerning liver lesions. She has evidence of thrombocytopenia, elevated INR (in June), macrocytic anemia all consistent with chronic liver disease. Previous hepatitis A/B/C serologies negative in 2019. Total bili, alk phos, and AST trending down today. Steroids for treatment of ETOH hepatitis relatively contraindicated in GI bleed. Trace ascites on ultrasound, no evidence of encephalopathy.  --Chem dep consult recommended. Needs to avoid alcohol.   --Trend LFTs/bili/INR.   --Thiamine, folate.   --Will  need outpatient liver clinic follow up in [place].     Discussed with Dr. Ordoñez.     Total time spent:  45 minutes was spent providing patient care, including patient evaluation, reviewing documentation/test results, and . Thank you for asking us to participate in the care of this patient.    ZEFERINO Morrison  [facility] ([facility])    --------------------------  I agree with the assessment and plan of Cristina Gagnon PA-C.  Patient reports hematemesis, melena, and abdominal pain.  ON exam she is jaundiced, abd is tender to palpation, and rectal exam shows dark green stool with possible black specks.  A/P- alcoholic liver disease/cirrhosis with hematemesis and melena, EGD today in the OR.  Further recommendations to follow afterwards.    Wilder Ordoñez MD    This was a shared visit and I spent about 20 minutes in the care of this patient.

## 2023-08-11 NOTE — ED PROVIDER NOTES
History     Chief Complaint:  Hematemesis and Rectal Bleeding       HPI   Tanika Chau is a 27 year old female with history of alcohol abuse who presents with hematemesis and black stools that began this morning after the patient woke up. She reports not being able to eat and experiencing epigastric pain. The patient reports having a day of drinking two days ago after 28 days of being sober. She reports last taking Ibuprofen yesterday, but not since these symptoms began. She reports being lightheaded, but denies withdrawal symptoms.    Of note, 2019 was the last time the patient had a scope or endoscopy.    Independent Historian:    The patient provided the history noted above.    Review of External Notes:  Reviewed hospital admission from 6/17/2023 in which the patient was admitted for hematemesis and anemia.  No EGD was done at that time.  Patient was treated conservatively.    Medications:    Elavil  Flexeril  Pepcid  Macrobid  Zofran  Protonix    Past Medical History:    Alcohol abuse  Alcoholic cirrhosis of liver  Anxiety  Gastric diverticulum  Cellulitis  Depression     Past Surgical History:    Endoscopic retrograde cholangiopancreatogram  Tonsillectomy, adenoidectomy, combined     Physical Exam   Patient Vitals for the past 24 hrs:   BP Temp Pulse Resp SpO2   08/10/23 2300 110/78 -- 95 -- 98 %   08/10/23 2235 111/73 -- -- -- 97 %   08/10/23 2230 111/73 -- 93 -- 98 %   08/10/23 2219 117/82 -- 92 17 96 %   08/10/23 2146 (!) 133/91 98.3  F (36.8  C) 98 18 99 %      Physical Exam  General: Patient is awake, alert  Head: The scalp, face, and head appear normal  Eyes: The pupils are equal, round, and reactive to light. Conjunctivae and sclerae are normal  ENT: External acoustic canals are normal. The oropharynx is normal without erythema. Uvula is in the midline  Neck: Normal range of motion.   CV: Regular rate and rhythm.   Resp: Lungs are clear without wheezes or rales. No respiratory distress.   GI:  epigastric tenderness without guarding or rebound     MS: Normal tone.   Skin: No rash or lesions noted. Normal capillary refill noted  Neuro: Speech is normal and fluent. Face is symmetric. Moving all extremities.   Psych:  Anxious. Appropriate interactions.    Emergency Department Course     Laboratory:  Labs Ordered and Resulted from Time of ED Arrival to Time of ED Departure   COMPREHENSIVE METABOLIC PANEL - Abnormal       Result Value    Sodium 140      Potassium 3.8      Chloride 97 (*)     Carbon Dioxide (CO2) 26      Anion Gap 17 (*)     Urea Nitrogen 12.3      Creatinine 0.37 (*)     Calcium 9.7      Glucose 86      Alkaline Phosphatase 306 (*)      (*)     ALT 32      Protein Total 8.2      Albumin 4.0      Bilirubin Total 4.1 (*)     GFR Estimate >90     CBC WITH PLATELETS AND DIFFERENTIAL - Abnormal    WBC Count 7.0      RBC Count 3.23 (*)     Hemoglobin 10.9 (*)     Hematocrit 32.7 (*)      (*)     MCH 33.7 (*)     MCHC 33.3      RDW 17.9 (*)     Platelet Count 112 (*)     % Neutrophils 76      % Lymphocytes 13      % Monocytes 9      % Eosinophils 0      % Basophils 1      % Immature Granulocytes 1      NRBCs per 100 WBC 0      Absolute Neutrophils 5.3      Absolute Lymphocytes 0.9      Absolute Monocytes 0.7      Absolute Eosinophils 0.0      Absolute Basophils 0.1      Absolute Immature Granulocytes 0.1      Absolute NRBCs 0.0     MAGNESIUM - Abnormal    Magnesium 1.5 (*)    HCG QUALITATIVE PREGNANCY - Normal    hCG Serum Qualitative Negative     LIPASE - Normal    Lipase 37     LACTIC ACID WHOLE BLOOD - Normal    Lactic Acid 1.5     ETHYL ALCOHOL LEVEL - Normal    Alcohol ethyl <0.01     TYPE AND SCREEN, ADULT    ABO/RH(D) O POS      Antibody Screen Negative      SPECIMEN EXPIRATION DATE [id number]     ABO/RH TYPE AND SCREEN      Emergency Department Course & Assessments:       Interventions:  Medications   ondansetron (ZOFRAN) injection 4 mg (4 mg Intravenous $Given 8/10/23 5259)    pantoprazole (PROTONIX) 80 mg in sodium chloride 0.9 % 100 mL infusion (8 mg/hr Intravenous $New Bag 8/10/23 2235)   magnesium sulfate 2 g in 50 mL sterile water intermittent infusion (has no administration in time range)   0.9% sodium chloride BOLUS (1,000 mLs Intravenous $New Bag 8/10/23 2225)   pantoprazole (PROTONIX) IV push injection 40 mg (40 mg Intravenous $Given 8/10/23 2224)   morphine (PF) injection 4 mg (4 mg Intravenous $Given 8/10/23 2230)        Assessments:  2203 I obtained history and examined the patient as noted above.     Independent Interpretation (X-rays, CTs, rhythm strip):  None    Consultations/Discussion of Management or Tests:  2348 I spoke with hospitalist Dr. Retana about the patient's presentation, findings, and plan of care.      Social Determinants of Health affecting care:  Stress/Adjustment Disorders     Disposition:  The patient was admitted to the hospital under the care of Dr. Retana.     Impression & Plan    CMS Diagnoses: None    Medical Decision Making:  Patient is a 27-year-old with past medical history of upper GI bleed and alcohol abuse who presents to the emergency department today with hematemesis and melena that began today.  Patient reports she recently fell off the wagon and had a drinking binge.  She is also intermittently been using ibuprofen.  Today she began having bloody emesis and melena.  She also reports epigastric pain and dizziness.  Upon initial evaluation here she is hemodynamically stable with no vital signs.  She is afebrile and oxygenating well on room air.  Physical exam she appears uncomfortable and quite anxious.  Workup was initiated to investigate her likely upper GI bleed.  At this time her hemoglobin is actually above her baseline.  Type and screen was completed but no indication for transfusion at this juncture.  Patient was started on IV fluids and a Protonix infusion after Protonix bolus was given.  Patient will be admitted for EGD and further  monitoring of her hemoglobin.  No indication for octreotide at this time.    Diagnosis:    ICD-10-CM    1. Hematemesis with nausea  K92.0       2. Melena  K92.1       3. Alcohol abuse  F10.10          Scribe Disclosure:  ЕЛЕНА, Ralf Leahy, am serving as a scribe at 10:04 PM on 8/10/2023 to document services personally performed by Praveen Appiah MD based on my observations and the provider's statements to me.               Praveen Appiah MD  08/11/23 0459

## 2023-08-11 NOTE — PROGRESS NOTES
[facility]    Hospitalist Progress Note  Provider : Anna Carrasquillo MD, MD  Date of Service (when I saw the patient): 08/11/2023    Summary of Stay: Tanika Chau is a 27 year old female with a history of alcoholic cirrhosis without e/o varices by EGD 2019, gastric diverticulum, not on any medications admitted on 8/10/2023 with n/v with hematemesis and dark stools and ruq abdominal pain     She is here from [place] visiting her boyfriend.     She was hospitalized here 6/17-6/19/2023 for hematemesis suspected due to MW tear.       She states she relapsed 2 days ago then woke up with a hangover yesterday.  At around 1030 am she had a dark stool with coffee like splatter that she was concerned was blood.  This was followed by multiple rounds of vomiting, initially her vomit was bright red in color then changing to dark brown with mucoid clots.  She noted bilateral abdominal pain described as burning in nature.  She rarely takes ibuprofen but took some yesterday.  She denies any fevers     ER eval VS stable and she is afebrile   CMP with Alk phos 306 bili 4.1, AST//32, gap is 17  CBC with macrocytic anemia 11 range, and chronic thrombocytopenia 100's     Exam is notable for ruq and epigastric ttp      Problem List:   Hematemesis  Started with bright red blood so desmodn vaughan tear seems less likely.  Last EGD 2019 and did not show any e/o of varices at that time. She was noted to have a gastric diverticulum.  At discharge it was recommended that she follow-up with [facility] for EGD but apparently that never happened. Suspect gastritis although may have developed varices by now   -[facility] consultated and planning to do EGD today  -Will continue PPI and octreotide drip  -Will monitor serial hemoglobin  -Will continue empiric ceftriaxone for now     RUQ abdominal pain with elevated alk phos and bili  Alk phos and bili up compared with baseline and with ruq abdominal pain so AUS ordered and  GB is negative but liver with cirrhotic features and steatohepatitis so wonder about inflammatory enlargement and visceral  pain   -prn low dose oxy and hydromorphone ordered for discomfort     Elevated AG with normal CO2  Likely starvation ketosis  -CK ketones     Cirrhosis alcoholic with small amount of ascites   Alcohol misuse  Reports being sober for 28 days then fell relapsed 2 days pta. She's aware that this damages her already vulnerable liver. She reports a good support network in [place] with family and friends, but also attends meetings regularly and has a sponsor     Thrombocytopenia/Macrocytic anemia  Chronic, likely due to liver disease, stable      Hypomag  Replacement protocol    DVT Prophylaxis: Pneumatic Compression Devices  Code Status: Full Code    Disposition: Expected discharge in 1-2 days     Anna Carrasquillo MD    Interval History   Patient seen and examined. She stated that she is feeling better. She has no nausea or vomiting. No pain. Has no fever.     -Data reviewed today: I reviewed all new labs and imaging results over the last 24 hours. I personally reviewed no images or EKG's today.    Physical Exam   Temp: 97.6  F (36.4  C) Temp src: Oral BP: 119/73 Pulse: 86   Resp: 18 SpO2: 98 % O2 Device: None (Room air)    Vitals:    08/11/23 0624   Weight: 62.2 kg (137 lb 3.2 oz)     Vital Signs with Ranges  Temp:  [97.6  F (36.4  C)-98.3  F (36.8  C)] 97.6  F (36.4  C)  Pulse:  [] 86  Resp:  [16-18] 18  BP: (110-133)/(65-91) 119/73  SpO2:  [96 %-99 %] 98 %  I/O last 3 completed shifts:  In: 97.33 [I.V.:97.33]  Out: -     GEN:  Alert, oriented x 3, appears comfortable, NAD.  HEENT:  Normocephalic/atraumatic, no scleral icterus, no nasal discharge, mouth moist.  CV:  Regular rate and rhythm, no murmur or JVD.  S1 + S2 noted, no S3 or S4.  LUNGS:  Clear to auscultation bilaterally without rales/rhonchi/wheezing/retractions.  Symmetric chest rise on inhalation noted.  ABD:  Active bowel sounds,  soft, non-tender/non-distended.  No rebound/guarding/rigidity.  EXT:  No edema or cyanosis.  Hands/feet warm to touch with good signs of peripheral perfusion.  No joint synovitis noted.  SKIN:  Dry to touch, no exanthems noted in the visualized areas.  NEURO:  Symmetric muscle strength, sensation to touch grossly intact.  No new focal deficits appreciated.    Medications    octreotide (sandoSTATIN) infusion ADULT 50 mcg/hr (08/11/23 0818)    pantoprazole (PROTONIX) 80 mg in sodium chloride 0.9 % 100 mL infusion 8 mg/hr (08/11/23 0847)      cefTRIAXone  1 g Intravenous Q24H    docusate sodium  100 mg Oral BID       Data   Recent Labs   Lab 08/11/23  0720 08/10/23  2156   WBC 5.4 7.0   HGB 9.4* 10.9*   * 101*   PLT 80* 112*    140   POTASSIUM 3.7 3.8   CHLORIDE 99 97*   CO2 23 26   BUN 11.1 12.3   CR 0.42* 0.37*   ANIONGAP 17* 17*   NORA 8.3* 9.7   GLC 55* 86   ALBUMIN 3.5 4.0   PROTTOTAL 6.8 8.2   BILITOTAL 3.3* 4.1*   ALKPHOS 241* 306*   ALT 24 32   * 162*   LIPASE  --  37       Recent Results (from the past 24 hour(s))   US Abdomen Limited    Narrative    EXAM: US ABDOMEN LIMITED  LOCATION: [facility]  DATE: 8/11/2023    INDICATION: Hematemesis.  COMPARISON: 06/17/2023.  TECHNIQUE: Limited abdominal ultrasound.    FINDINGS:    GALLBLADDER: Normal. No gallstones, wall thickening, or pericholecystic fluid. Negative sonographic Mckeon's sign.    BILE DUCTS: No biliary dilatation. The common duct measures 4 mm.    LIVER: Fatty infiltration of the liver. There is also heterogeneous hepatic echotexture with nodular contour compatible with underlying hepatic parenchymal disease. No focal hepatic lesions.    RIGHT KIDNEY: No hydronephrosis.    PANCREAS: The visualized portions are normal.    Trace amount of ascites in the right upper quadrant.      Impression    IMPRESSION:  1.  Gallbladder is negative. No biliary dilatation.    2.  Cirrhotic appearance of the liver with diffuse  fatty infiltration.    3.  Trace amount of ascites in the right upper quadrant.

## 2023-08-11 NOTE — PHARMACY-ADMISSION MEDICATION HISTORY
Pharmacist Admission Medication History    Admission medication history is complete. The information provided in this note is only as accurate as the sources available at the time of the update.      Prior to Admission medications    None

## 2023-08-12 LAB
ALBUMIN SERPL BCG-MCNC: 3.6 G/DL (ref 3.5–5.2)
ALP SERPL-CCNC: 226 U/L (ref 35–104)
ALT SERPL W P-5'-P-CCNC: 26 U/L (ref 0–50)
ANION GAP SERPL CALCULATED.3IONS-SCNC: 8 MMOL/L (ref 7–15)
AST SERPL W P-5'-P-CCNC: 119 U/L (ref 0–45)
BILIRUB SERPL-MCNC: 2.6 MG/DL
BUN SERPL-MCNC: 8.4 MG/DL (ref 6–20)
CALCIUM SERPL-MCNC: 8.6 MG/DL (ref 8.6–10)
CHLORIDE SERPL-SCNC: 97 MMOL/L (ref 98–107)
CREAT SERPL-MCNC: 0.47 MG/DL (ref 0.51–0.95)
DEPRECATED HCO3 PLAS-SCNC: 29 MMOL/L (ref 22–29)
ERYTHROCYTE [DISTWIDTH] IN BLOOD BY AUTOMATED COUNT: 17 % (ref 10–15)
GFR SERPL CREATININE-BSD FRML MDRD: >90 ML/MIN/1.73M2
GLUCOSE SERPL-MCNC: 164 MG/DL (ref 70–99)
HCT VFR BLD AUTO: 28.2 % (ref 35–47)
HGB BLD-MCNC: 9.3 G/DL (ref 11.7–15.7)
MCH RBC QN AUTO: 33.1 PG (ref 26.5–33)
MCHC RBC AUTO-ENTMCNC: 33 G/DL (ref 31.5–36.5)
MCV RBC AUTO: 100 FL (ref 78–100)
PLATELET # BLD AUTO: 95 10E3/UL (ref 150–450)
POTASSIUM SERPL-SCNC: 4.3 MMOL/L (ref 3.4–5.3)
PROT SERPL-MCNC: 6.7 G/DL (ref 6.4–8.3)
RBC # BLD AUTO: 2.81 10E6/UL (ref 3.8–5.2)
SODIUM SERPL-SCNC: 134 MMOL/L (ref 136–145)
WBC # BLD AUTO: 5 10E3/UL (ref 4–11)

## 2023-08-12 PROCEDURE — 36415 COLL VENOUS BLD VENIPUNCTURE: CPT | Performed by: INTERNAL MEDICINE

## 2023-08-12 PROCEDURE — 258N000003 HC RX IP 258 OP 636: Performed by: INTERNAL MEDICINE

## 2023-08-12 PROCEDURE — 250N000011 HC RX IP 250 OP 636: Mod: JZ | Performed by: INTERNAL MEDICINE

## 2023-08-12 PROCEDURE — 80053 COMPREHEN METABOLIC PANEL: CPT | Performed by: INTERNAL MEDICINE

## 2023-08-12 PROCEDURE — 250N000013 HC RX MED GY IP 250 OP 250 PS 637: Performed by: INTERNAL MEDICINE

## 2023-08-12 PROCEDURE — 85027 COMPLETE CBC AUTOMATED: CPT | Performed by: INTERNAL MEDICINE

## 2023-08-12 PROCEDURE — 120N000001 HC R&B MED SURG/OB

## 2023-08-12 PROCEDURE — 99232 SBSQ HOSP IP/OBS MODERATE 35: CPT | Mod: GC | Performed by: INTERNAL MEDICINE

## 2023-08-12 RX ADMIN — OCTREOTIDE ACETATE 50 MCG/HR: 200 INJECTION, SOLUTION INTRAVENOUS; SUBCUTANEOUS at 05:06

## 2023-08-12 RX ADMIN — OXYCODONE HYDROCHLORIDE 5 MG: 5 TABLET ORAL at 07:40

## 2023-08-12 RX ADMIN — DOCUSATE SODIUM 100 MG: 100 CAPSULE, LIQUID FILLED ORAL at 07:40

## 2023-08-12 RX ADMIN — HYDROMORPHONE HYDROCHLORIDE 0.4 MG: 0.2 INJECTION, SOLUTION INTRAMUSCULAR; INTRAVENOUS; SUBCUTANEOUS at 21:12

## 2023-08-12 RX ADMIN — OXYCODONE HYDROCHLORIDE 5 MG: 5 TABLET ORAL at 14:47

## 2023-08-12 RX ADMIN — ONDANSETRON 4 MG: 2 INJECTION INTRAMUSCULAR; INTRAVENOUS at 03:32

## 2023-08-12 RX ADMIN — HYDROMORPHONE HYDROCHLORIDE 0.4 MG: 0.2 INJECTION, SOLUTION INTRAMUSCULAR; INTRAVENOUS; SUBCUTANEOUS at 03:36

## 2023-08-12 RX ADMIN — PANTOPRAZOLE SODIUM 40 MG: 40 TABLET, DELAYED RELEASE ORAL at 07:40

## 2023-08-12 RX ADMIN — ONDANSETRON 4 MG: 2 INJECTION INTRAMUSCULAR; INTRAVENOUS at 16:46

## 2023-08-12 RX ADMIN — CEFTRIAXONE 1 G: 1 INJECTION, POWDER, FOR SOLUTION INTRAMUSCULAR; INTRAVENOUS at 02:35

## 2023-08-12 RX ADMIN — PANTOPRAZOLE SODIUM 40 MG: 40 TABLET, DELAYED RELEASE ORAL at 19:58

## 2023-08-12 RX ADMIN — OXYCODONE HYDROCHLORIDE 5 MG: 5 TABLET ORAL at 22:05

## 2023-08-12 RX ADMIN — OXYCODONE HYDROCHLORIDE 5 MG: 5 TABLET ORAL at 00:44

## 2023-08-12 RX ADMIN — DOCUSATE SODIUM 100 MG: 100 CAPSULE, LIQUID FILLED ORAL at 19:58

## 2023-08-12 RX ADMIN — HYDROMORPHONE HYDROCHLORIDE 0.4 MG: 0.2 INJECTION, SOLUTION INTRAMUSCULAR; INTRAVENOUS; SUBCUTANEOUS at 16:45

## 2023-08-12 ASSESSMENT — ACTIVITIES OF DAILY LIVING (ADL)
ADLS_ACUITY_SCORE: 32

## 2023-08-12 NOTE — PLAN OF CARE
Goal Outcome Evaluation:    Temp: 98.3  F (36.8  C) Temp src: Oral BP: 129/72 Pulse: 107   Resp: 28 SpO2: 98 % O2 Device: None (Room air)       VSS. Capno in place. C/O abdominal pain. PRN oxy and dilaudid given, with improvement. C/O nausea. PRN zofran and compazine given. Octreotide drip infusing. EGD completed today with 5 bands placed. No bowel movement this shift. Tolerating full liquid diet. Up with SBA.      Plan of Care Reviewed With: patient    Overall Patient Progress: improvingOverall Patient Progress: improving

## 2023-08-12 NOTE — PLAN OF CARE
Nursing Summary:    [id number]    Pt is alert and oriented x 4. PRN oxycodone and dilaudid admninistered for pain, Zofran administered for nausea, Pt ambulates 1 assist /SBA. IV Octreotide infusing at 10 ml/hr. Ongoing monitoring.    /76 (BP Location: Right arm)   Pulse 71   Temp 97.6  F (36.4  C) (Oral)   Resp 16   Wt 62.2 kg (137 lb 3.2 oz)   SpO2 97%   BMI 22.14 kg/m

## 2023-08-12 NOTE — PROGRESS NOTES
[facility]    Hospitalist Progress Note  Provider : Anna Carrasquillo MD, MD  Date of Service (when I saw the patient): 08/12/2023    Summary of Stay: Tanika Chau is a 27 year old female with a history of alcoholic cirrhosis without e/o varices by EGD 2019, gastric diverticulum, not on any medications admitted on 8/10/2023 with n/v with hematemesis and dark stools and ruq abdominal pain     She is here from [place] visiting her boyfriend.     She was hospitalized here 6/17-6/19/2023 for hematemesis suspected due to MW tear.       She states she relapsed 2 days ago then woke up with a hangover yesterday.  At around 1030 am she had a dark stool with coffee like splatter that she was concerned was blood.  This was followed by multiple rounds of vomiting, initially her vomit was bright red in color then changing to dark brown with mucoid clots.  She noted bilateral abdominal pain described as burning in nature.  She rarely takes ibuprofen but took some yesterday.  She denies any fevers     ER eval VS stable and she is afebrile   CMP with Alk phos 306 bili 4.1, AST//32, gap is 17  CBC with macrocytic anemia 11 range, and chronic thrombocytopenia 100's     Problem List:   Hematemesis  Started with bright red blood so desmond vaughan tear seems less likely.  Last EGD 2019 and did not show any e/o of varices at that time. She was noted to have a gastric diverticulum.  At discharge it was recommended that she follow-up with [facility] for EGD but apparently that never happened. Suspect gastritis although may have developed varices by now   -[facility] consultated and patient underwent EGD. EGD showed Grade II- III esophageal varices s/p 5 bands placed with incomplete eradication of the varices although did deflate them to grade I-II. Erythematous mucosa in the stomach, consistent with moderate portal hypertensive gastropathy. Normal examined duodenum.   -GI recommending to continue octreotide drip  for 72 hours total, 7 days of antibiotics, PPI po bid. Will neeed repeat EGD in 4 weeks  -Will monitor serial hemoglobin  -Will continue empiric ceftriaxone for now     RUQ abdominal pain with elevated alk phos and bili  Alk phos and bili up compared with baseline and with ruq abdominal pain so AUS ordered and GB is negative but liver with cirrhotic features and steatohepatitis so wonder about inflammatory enlargement and visceral  pain   -prn low dose oxy and hydromorphone ordered for discomfort     Elevated AG with normal CO2  Likely starvation ketosis  -CK ketones     Cirrhosis alcoholic with small amount of ascites   Alcohol misuse  Reports being sober for 28 days then fell relapsed 2 days pta. She's aware that this damages her already vulnerable liver. She reports a good support network in [place] with family and friends, but also attends meetings regularly and has a sponsor     Thrombocytopenia/Macrocytic anemia  Chronic, likely due to liver disease, stable      Hypomag  Replacement protocol    DVT Prophylaxis: Pneumatic Compression Devices  Code Status: Full Code    Disposition: Expected discharge in 1-2 days     Anna Carrasquillo MD    Interval History   Patient seen and examined. She stated that she is feeling better. She has no nausea or vomiting. No pain. Has no fever.     -Data reviewed today: I reviewed all new labs and imaging results over the last 24 hours. I personally reviewed no images or EKG's today.    Physical Exam   Temp: 97.6  F (36.4  C) Temp src: Oral BP: 121/76 Pulse: 71   Resp: 16 SpO2: 97 % O2 Device: None (Room air)    Vitals:    08/11/23 0624   Weight: 62.2 kg (137 lb 3.2 oz)     Vital Signs with Ranges  Temp:  [97.4  F (36.3  C)-98.9  F (37.2  C)] 97.6  F (36.4  C)  Pulse:  [] 71  Resp:  [11-28] 16  BP: (101-146)/(49-98) 121/76  SpO2:  [94 %-100 %] 97 %  I/O last 3 completed shifts:  In: 800 [I.V.:800]  Out: -     GEN:  Alert, oriented x 3, appears comfortable, NAD.  HEENT:   Normocephalic/atraumatic, no scleral icterus, no nasal discharge, mouth moist.  CV:  Regular rate and rhythm, no murmur or JVD.  S1 + S2 noted, no S3 or S4.  LUNGS:  Clear to auscultation bilaterally without rales/rhonchi/wheezing/retractions.  Symmetric chest rise on inhalation noted.  ABD:  Active bowel sounds, soft, non-tender/non-distended.  No rebound/guarding/rigidity.  EXT:  No edema or cyanosis.  Hands/feet warm to touch with good signs of peripheral perfusion.  No joint synovitis noted.  SKIN:  Dry to touch, no exanthems noted in the visualized areas.  NEURO:  Symmetric muscle strength, sensation to touch grossly intact.  No new focal deficits appreciated.    Medications    octreotide (sandoSTATIN) infusion ADULT 50 mcg/hr (08/12/23 0741)      cefTRIAXone  1 g Intravenous Q24H    docusate sodium  100 mg Oral BID    pantoprazole  40 mg Oral BID       Data   Recent Labs   Lab 08/12/23  0709 08/11/23  1757 08/11/23  1329 08/11/23  1206 08/11/23  0720 08/10/23  2156   WBC 5.0  --   --   --  5.4 7.0   HGB 9.3* 9.6*  --  9.6* 9.4* 10.9*     --   --   --  103* 101*   PLT 95*  --   --   --  80* 112*   *  --   --   --  139 140   POTASSIUM 4.3  --   --   --  3.7 3.8   CHLORIDE 97*  --   --   --  99 97*   CO2 29  --   --   --  23 26   BUN 8.4  --   --   --  11.1 12.3   CR 0.47*  --   --   --  0.42* 0.37*   ANIONGAP 8  --   --   --  17* 17*   NORA 8.6  --   --   --  8.3* 9.7   *  --  133*  --  55* 86   ALBUMIN 3.6  --   --   --  3.5 4.0   PROTTOTAL 6.7  --   --   --  6.8 8.2   BILITOTAL 2.6*  --   --   --  3.3* 4.1*   ALKPHOS 226*  --   --   --  241* 306*   ALT 26  --   --   --  24 32   *  --   --   --  125* 162*   LIPASE  --   --   --   --   --  37         No results found for this or any previous visit (from the past 24 hour(s)).

## 2023-08-12 NOTE — PROGRESS NOTES
Gastroenterology Progress Note     Subjective   Patient is a 27-year-old female with a history of alcohol abuse.  She was admitted in June 2023 with dark emesis and melena thought to be secondary to a Rupali-Garland tear.  Outpatient upper endoscopy was recommended but never done.  She reported PPI for 6 weeks after that.  She was sober for 28 days.  She then had 5 white claws on 8/9/2023.  She started having black stool and later bloody emesis.  She had an upper endoscopy which showed esophageal varices which were banded.  She is currently on PPI and octreotide.  She is on a full liquid diet.  She is having some abdominal discomfort.     Objective     Vitals Blood pressure 121/76, pulse 71, temperature 97.6  F (36.4  C), temperature source Oral, resp. rate 16, weight 62.2 kg (137 lb 3.2 oz), SpO2 97 %.          Physical Exam  General: awake, alert, oriented times three   Cardiovascular: RRR, no edema   Chest: lungs are clear to auscultation bilaterally   Abdomen: soft, non-tender, non-distended, bowel sounds present   Neurologic: grossly intact, moves all four extremities         Laboratory     Electrolytes    Recent Labs   Lab 08/12/23  0709 08/11/23  1329 08/11/23  0720 08/10/23  2156   *  --  139 140   POTASSIUM 4.3  --  3.7 3.8   CHLORIDE 97*  --  99 97*   CO2 29  --  23 26   * 133* 55* 86   CR 0.47*  --  0.42* 0.37*   BUN 8.4  --  11.1 12.3      Hematology    Recent Labs   Lab 08/12/23  0709 08/11/23  1757 08/11/23  1206 08/11/23  0720 08/10/23  2156   HGB 9.3* 9.6* 9.6* 9.4* 10.9*     --   --  103* 101*   WBC 5.0  --   --  5.4 7.0   PLT 95*  --   --  80* 112*      LFTs & Lipase    Recent Labs   Lab 08/12/23  0709 08/11/23  0720 08/10/23  2156   * 125* 162*   ALT 26 24 32   ALKPHOS 226* 241* 306*   BILITOTAL 2.6* 3.3* 4.1*   LIPASE  --   --  37       I have reviewed the current diagnostic and laboratory tests.           Impression and Plan    1.  Melena hematemesis.  Esophageal  varices with 5 bands placed yesterday.  On octreotide and PPI drip.  Octreotide for 3 days or until discharge. May advance diet.    2.  Alcoholic hepatitis/liver cirrhosis.  Imaging suggestive of cirrhosis.  She does have thrombocytopenia, elevated INR, macrocytic anemia.  Steroids for alcoholic hepatitis contraindicated with GI bleed.  Follow LFTs. Renal function normal.    3. Patient may be able to be discharged tomorrow or Monday       30 minutes of total time was spent providing patient care including patient evaluation, reviewing documentation/test results, and .           Azam Vaughan MD  Thank you for the opportunity to participate in the care of this patient.   Please feel free to call me with any questions or concerns.  Phone number [phone].

## 2023-08-12 NOTE — PLAN OF CARE
Goal Outcome Evaluation:      Plan of Care Reviewed With: patient    Overall Patient Progress: improvingOverall Patient Progress: improving    /76 (BP Location: Right arm)   Pulse 71   Temp 97.6  F (36.4  C) (Oral)   Resp 16   Wt 62.2 kg (137 lb 3.2 oz)   SpO2 97%   BMI 22.14 kg/m      On RA.    Pertinent Assessments: A/Ox4. LS clear. 5-7/10 abdominal pain. Reports intermittent nausea mostly associated with IV dilaudid. Voids via BR. Up with SBA.   Major Shift Events: Advanced to regular diet.   Treatment Plan: Pain mgmt. IV abx. GI following. Per recommendation in EGD report, Octreotide gtt  for 72 hours (started 8/11 approx 0315), abx for 7 days and EGD repeat recommended in 4 weeks. On PO PPI.

## 2023-08-13 VITALS
SYSTOLIC BLOOD PRESSURE: 115 MMHG | WEIGHT: 133.9 LBS | DIASTOLIC BLOOD PRESSURE: 75 MMHG | TEMPERATURE: 98.6 F | RESPIRATION RATE: 16 BRPM | BODY MASS INDEX: 21.61 KG/M2 | OXYGEN SATURATION: 97 % | HEART RATE: 76 BPM

## 2023-08-13 LAB
ALBUMIN SERPL BCG-MCNC: 3.3 G/DL (ref 3.5–5.2)
ALP SERPL-CCNC: 222 U/L (ref 35–104)
ALT SERPL W P-5'-P-CCNC: 31 U/L (ref 0–50)
ANION GAP SERPL CALCULATED.3IONS-SCNC: 9 MMOL/L (ref 7–15)
AST SERPL W P-5'-P-CCNC: 143 U/L (ref 0–45)
BILIRUB DIRECT SERPL-MCNC: 1.49 MG/DL (ref 0–0.3)
BILIRUB SERPL-MCNC: 2.4 MG/DL
BUN SERPL-MCNC: 8.8 MG/DL (ref 6–20)
CALCIUM SERPL-MCNC: 8.2 MG/DL (ref 8.6–10)
CHLORIDE SERPL-SCNC: 99 MMOL/L (ref 98–107)
CREAT SERPL-MCNC: 0.53 MG/DL (ref 0.51–0.95)
DEPRECATED HCO3 PLAS-SCNC: 28 MMOL/L (ref 22–29)
ERYTHROCYTE [DISTWIDTH] IN BLOOD BY AUTOMATED COUNT: 17.3 % (ref 10–15)
GFR SERPL CREATININE-BSD FRML MDRD: >90 ML/MIN/1.73M2
GLUCOSE SERPL-MCNC: 119 MG/DL (ref 70–99)
HCT VFR BLD AUTO: 28.6 % (ref 35–47)
HGB BLD-MCNC: 9.4 G/DL (ref 11.7–15.7)
MCH RBC QN AUTO: 33.6 PG (ref 26.5–33)
MCHC RBC AUTO-ENTMCNC: 32.9 G/DL (ref 31.5–36.5)
MCV RBC AUTO: 102 FL (ref 78–100)
PLATELET # BLD AUTO: 106 10E3/UL (ref 150–450)
POTASSIUM SERPL-SCNC: 3.6 MMOL/L (ref 3.4–5.3)
PROT SERPL-MCNC: 6.5 G/DL (ref 6.4–8.3)
RBC # BLD AUTO: 2.8 10E6/UL (ref 3.8–5.2)
SODIUM SERPL-SCNC: 136 MMOL/L (ref 136–145)
WBC # BLD AUTO: 5.4 10E3/UL (ref 4–11)

## 2023-08-13 PROCEDURE — 99239 HOSP IP/OBS DSCHRG MGMT >30: CPT | Performed by: INTERNAL MEDICINE

## 2023-08-13 PROCEDURE — 85048 AUTOMATED LEUKOCYTE COUNT: CPT | Performed by: INTERNAL MEDICINE

## 2023-08-13 PROCEDURE — 250N000011 HC RX IP 250 OP 636: Performed by: INTERNAL MEDICINE

## 2023-08-13 PROCEDURE — 82248 BILIRUBIN DIRECT: CPT | Performed by: INTERNAL MEDICINE

## 2023-08-13 PROCEDURE — 82310 ASSAY OF CALCIUM: CPT | Performed by: INTERNAL MEDICINE

## 2023-08-13 PROCEDURE — 36415 COLL VENOUS BLD VENIPUNCTURE: CPT | Performed by: INTERNAL MEDICINE

## 2023-08-13 PROCEDURE — 85027 COMPLETE CBC AUTOMATED: CPT | Performed by: INTERNAL MEDICINE

## 2023-08-13 PROCEDURE — 258N000003 HC RX IP 258 OP 636: Performed by: INTERNAL MEDICINE

## 2023-08-13 PROCEDURE — 250N000013 HC RX MED GY IP 250 OP 250 PS 637: Performed by: INTERNAL MEDICINE

## 2023-08-13 RX ORDER — PANTOPRAZOLE SODIUM 40 MG/1
40 TABLET, DELAYED RELEASE ORAL 2 TIMES DAILY
Qty: 60 TABLET | Refills: 0 | Status: SHIPPED | OUTPATIENT
Start: 2023-08-13 | End: 2023-09-12

## 2023-08-13 RX ORDER — ONDANSETRON 4 MG/1
4 TABLET, FILM COATED ORAL EVERY 6 HOURS PRN
Qty: 20 TABLET | Refills: 0 | Status: SHIPPED | OUTPATIENT
Start: 2023-08-13 | End: 2024-04-21

## 2023-08-13 RX ORDER — CIPROFLOXACIN 500 MG/1
500 TABLET, FILM COATED ORAL 2 TIMES DAILY
Qty: 8 TABLET | Refills: 0 | Status: SHIPPED | OUTPATIENT
Start: 2023-08-13 | End: 2023-08-17

## 2023-08-13 RX ADMIN — OCTREOTIDE ACETATE 50 MCG/HR: 200 INJECTION, SOLUTION INTRAVENOUS; SUBCUTANEOUS at 04:58

## 2023-08-13 RX ADMIN — DOCUSATE SODIUM 100 MG: 100 CAPSULE, LIQUID FILLED ORAL at 07:53

## 2023-08-13 RX ADMIN — CEFTRIAXONE 1 G: 1 INJECTION, POWDER, FOR SOLUTION INTRAMUSCULAR; INTRAVENOUS at 01:40

## 2023-08-13 RX ADMIN — HYDROMORPHONE HYDROCHLORIDE 0.4 MG: 0.2 INJECTION, SOLUTION INTRAMUSCULAR; INTRAVENOUS; SUBCUTANEOUS at 01:40

## 2023-08-13 RX ADMIN — MAGNESIUM HYDROXIDE 30 ML: 400 SUSPENSION ORAL at 11:35

## 2023-08-13 RX ADMIN — ONDANSETRON 4 MG: 4 TABLET, ORALLY DISINTEGRATING ORAL at 01:40

## 2023-08-13 RX ADMIN — PANTOPRAZOLE SODIUM 40 MG: 40 TABLET, DELAYED RELEASE ORAL at 07:53

## 2023-08-13 RX ADMIN — OXYCODONE HYDROCHLORIDE 5 MG: 5 TABLET ORAL at 07:53

## 2023-08-13 RX ADMIN — OXYCODONE HYDROCHLORIDE 2.5 MG: 5 TABLET ORAL at 03:28

## 2023-08-13 ASSESSMENT — ACTIVITIES OF DAILY LIVING (ADL)
ADLS_ACUITY_SCORE: 32

## 2023-08-13 NOTE — DISCHARGE INSTRUCTIONS
Pastor [phone]  [facility]:  [phone]    The GI clinic should call you to schedule a repeat EGD and follow-up clinic appointment.

## 2023-08-13 NOTE — PLAN OF CARE
Goal Outcome Evaluation:      Plan of Care Reviewed With: patient    Overall Patient Progress: improvingOverall Patient Progress: improving     Temp: 98.4  F (36.9  C) Temp src: Oral BP: 102/70 Pulse: 68   Resp: 16 SpO2: 96 % O2 Device: None (Room air)       Aox4. On RA. Up SBA. C/of pain and nausea. IV dilaudid, Oxy, and Zofran given. No bloody stool/vomit.

## 2023-08-13 NOTE — PLAN OF CARE
Goal Outcome Evaluation:      Plan of Care Reviewed With: patient    Overall Patient Progress: no changeOverall Patient Progress: no change     Temp: 98.4  F (36.9  C) Temp src: Oral BP: 104/68 Pulse: 69   Resp: 18 SpO2: 96 % O2 Device: None (Room air)       A/O4. VSS. Up SBA. Abd pain, Prn dilaudid given x2. Oxycodone given x1. Pain gets worse with eating. Zofran given x1 for nausea. Tolerating regular diet. No emesis or stool this shift. On IV abx. Octreotide infusing. Bed alarm on. Discharge in 1-2 days.

## 2023-08-13 NOTE — PLAN OF CARE
Goal Outcome Evaluation:      Plan of Care Reviewed With: patient    Overall Patient Progress: improving    Reviewed discharge instructions with patient. Questions answered. Patient discharged to home via private ride with discharge instructions, medications (protonix, cipro and zofran) and belongings.    /75 (BP Location: Right arm)   Pulse 76   Temp 98.6  F (37  C) (Oral)   Resp 16   Wt 60.7 kg (133 lb 14.4 oz)   SpO2 97%   BMI 21.61 kg/m      On RA.    Discussed pain mgmt plan at discharge. Pt verbalized that MD instructed her not to take NSAIDs or Tylenol and that she would not be prescribed narcotics. Pt reported prior recommendations included heat and ice. Pt reported that she has been instructed by MD to continue OTC bowel meds and prune juice.

## 2023-08-13 NOTE — PROGRESS NOTES
Gastroenterology Progress Note     Subjective   Patient is a 27-year-old female with a history of alcohol abuse.  She was admitted in June 2023 with dark emesis and melena thought to be secondary to a Rupali-Garland tear.  Outpatient upper endoscopy was recommended but never done.  She reported PPI for 6 weeks after that.  She was sober for 28 days.  She then had 5 white claws on 8/9/2023.  She started having black stool and later bloody emesis.  She had an upper endoscopy which showed esophageal varices which were banded.  She is currently on PPI and octreotide.     She has not had a bowel movement since last Thursday.  She has tried some milk of magnesia and Colace without success.  She would like to be discharged despite this.     Objective     Vitals Blood pressure 106/77, pulse 74, temperature 98.5  F (36.9  C), temperature source Oral, resp. rate 16, weight 60.7 kg (133 lb 14.4 oz), SpO2 97 %.          Physical Exam  General: awake, alert, oriented times three   Cardiovascular: RRR, no edema   Chest: lungs are clear to auscultation bilaterally   Abdomen: soft, non-tender, non-distended, bowel sounds present   Neurologic: grossly intact, moves all four extremities         Laboratory     Electrolytes    Recent Labs   Lab 08/13/23  0636 08/12/23  0709 08/11/23  1329 08/11/23  0720    134*  --  139   POTASSIUM 3.6 4.3  --  3.7   CHLORIDE 99 97*  --  99   CO2 28 29  --  23   * 164* 133* 55*   CR 0.53 0.47*  --  0.42*   BUN 8.8 8.4  --  11.1        Hematology    Recent Labs   Lab 08/13/23  0636 08/12/23  0709 08/11/23  1757 08/11/23  1206 08/11/23  0720   HGB 9.4* 9.3* 9.6*   < > 9.4*   * 100  --   --  103*   WBC 5.4 5.0  --   --  5.4   * 95*  --   --  80*    < > = values in this interval not displayed.        LFTs & Lipase    Recent Labs   Lab 08/13/23  0636 08/12/23  0709 08/11/23  0720 08/10/23  2156   * 119* 125* 162*   ALT 31 26 24 32   ALKPHOS 222* 226* 241* 306*   BILITOTAL  2.4* 2.6* 3.3* 4.1*   LIPASE  --   --   --  37         I have reviewed the current diagnostic and laboratory tests.           Impression and Plan    1.  Melena, hematemesis.  Esophageal varices with 5 bands placed.  Is on octreotide and PPI.  She should have a follow-up upper endoscopy with possible repeat banding in 4 weeks.  I will arrange with [facility].    2.  Alcoholic hepatitis/liver cirrhosis.  Imaging suggestive of cirrhosis.  She does have thrombocytopenia, elevated INR, macrocytic anemia.  Steroids for alcoholic hepatitis contraindicated with GI bleed.  Renal function normal.  We discussed how critical it is for her to stop drinking completely.  We will schedule clinic follow-up in our hepatology clinic.    3.  Constipation.  We discussed using magnesium citrate or prune juice as an outpatient.  She may also use MiraLAX every 8 hours until bowel movement occurs.    4. Patient may be able to be discharged today.       25 minutes of total time was spent providing patient care including patient evaluation, reviewing documentation/test results, and .           Azam Vaughan MD  Thank you for the opportunity to participate in the care of this patient.   Please feel free to call me with any questions or concerns.  Phone number [phone].

## 2023-08-21 NOTE — DISCHARGE SUMMARY
[facility]    Discharge Summary  Hospitalist    Date of Admission:  8/10/2023  Date of Discharge:  8/13/2023  4:15 PM  Discharging Provider: Anna Carrasqulilo MD, MD  Date of Service (when I saw the patient): 8/13/2023    Discharge Diagnoses     Hematemesis  Started with bright red blood so desmond vaughan tear seems less likely     RUQ abdominal pain with elevated alk phos and bili    Elevated AG with normal CO2    Cirrhosis alcoholic with small amount of ascites   Alcohol misuse    Thrombocytopenia/Macrocytic anemia    Hypomagnesemia    History of Present Illness   Tanika Chau is an 27 year old female who presented with hematemesis and RUQ pain. Please see hospital course for details.    Hospital Course   Summary of Stay: Tanika Chau is a 27 year old female with a history of alcoholic cirrhosis without e/o varices by EGD 2019, gastric diverticulum, not on any medications admitted on 8/10/2023 with n/v with hematemesis and dark stools and ruq abdominal pain     She is here from [place] visiting her boyfriend.     She was hospitalized here 6/17-6/19/2023 for hematemesis suspected due to MW tear.       She states she relapsed 2 days ago then woke up with a hangover yesterday.  At around 1030 am she had a dark stool with coffee like splatter that she was concerned was blood.  This was followed by multiple rounds of vomiting, initially her vomit was bright red in color then changing to dark brown with mucoid clots.  She noted bilateral abdominal pain described as burning in nature.  She rarely takes ibuprofen but took some yesterday.  She denies any fevers     ER eval VS stable and she is afebrile   CMP with Alk phos 306 bili 4.1, AST//32, gap is 17  CBC with macrocytic anemia 11 range, and chronic thrombocytopenia 100's     Problem List:   Hematemesis  Started with bright red blood so desmond vaughan tear seems less likely.  Last EGD 2019 and did not show any e/o of varices  at that time. She was noted to have a gastric diverticulum.  At discharge it was recommended that she follow-up with [facility] for EGD but apparently that never happened. Suspect gastritis although may have developed varices by now   -[facility] consultated and patient underwent EGD. EGD showed Grade II- III esophageal varices s/p 5 bands placed with incomplete eradication of the varices although did deflate them to grade I-II. Erythematous mucosa in the stomach, consistent with moderate portal hypertensive gastropathy. Normal examined duodenum.   -GI recommending to continue octreotide drip for 72 hours total, 7 days of antibiotics, PPI po bid. Will neeed repeat EGD in 4 weeks  -Treated with IV Ceftriaxone during hospital course. She was discharged on oral Cipro to complete 7 days course. She was also discharged on PPI BID     RUQ abdominal pain with elevated alk phos and bili  Alk phos and bili up compared with baseline and with ruq abdominal pain so AUS ordered and GB is negative but liver with cirrhotic features and steatohepatitis so wonder about inflammatory enlargement and visceral  pain   -prn low dose oxy and hydromorphone ordered for discomfort     Elevated AG with normal CO2  Likely starvation ketosis  -CK ketones     Cirrhosis alcoholic with small amount of ascites   Alcohol misuse  Reports being sober for 28 days then fell relapsed 2 days pta. She's aware that this damages her already vulnerable liver. She reports a good support network in [place] with family and friends, but also attends meetings regularly and has a sponsor     Thrombocytopenia/Macrocytic anemia  Chronic, likely due to liver disease, stable      Hypomag  Replacement protocol     Significant Results and Procedures   Results for orders placed or performed during the hospital encounter of 08/10/23   US Abdomen Limited    Narrative    EXAM: US ABDOMEN LIMITED  LOCATION: [facility]  DATE: 8/11/2023    INDICATION:  Hematemesis.  COMPARISON: 06/17/2023.  TECHNIQUE: Limited abdominal ultrasound.    FINDINGS:    GALLBLADDER: Normal. No gallstones, wall thickening, or pericholecystic fluid. Negative sonographic Mckeon's sign.    BILE DUCTS: No biliary dilatation. The common duct measures 4 mm.    LIVER: Fatty infiltration of the liver. There is also heterogeneous hepatic echotexture with nodular contour compatible with underlying hepatic parenchymal disease. No focal hepatic lesions.    RIGHT KIDNEY: No hydronephrosis.    PANCREAS: The visualized portions are normal.    Trace amount of ascites in the right upper quadrant.      Impression    IMPRESSION:  1.  Gallbladder is negative. No biliary dilatation.    2.  Cirrhotic appearance of the liver with diffuse fatty infiltration.    3.  Trace amount of ascites in the right upper quadrant.         Pending Results   None      Code Status   Full Code       Primary Care Physician   Physician No Ref-Primary        Discharge Disposition   Discharged to home  Condition at discharge: Stable    Consultations This Hospital Stay   GASTROENTEROLOGY IP CONSULT    Time Spent on this Encounter   IAnna MD, MD, personally saw the patient today and spent greater than 30 minutes discharging this patient.    Discharge Orders      Reason for your hospital stay    GI bleeding     Activity    Your activity upon discharge: activity as tolerated     Follow-up and recommended labs and tests     Follow up with primary care provider, Physician No Ref-Primary, within 7 days  Follow up with gastroenterology as scheduled     Diet    Follow this diet upon discharge: Orders Placed This Encounter      Regular Diet Adult     Discharge Medications   Discharge Medication List as of 8/13/2023  4:09 PM        START taking these medications    Details   ciprofloxacin (CIPRO) 500 MG tablet Take 1 tablet (500 mg) by mouth 2 times daily for 4 days, Disp-8 tablet, R-0, Local Print      ondansetron (ZOFRAN)  4 MG tablet Take 1 tablet (4 mg) by mouth every 6 hours as needed for nausea, Disp-20 tablet, R-0, E-Prescribe      pantoprazole (PROTONIX) 40 MG EC tablet Take 1 tablet (40 mg) by mouth 2 times daily for 30 days, Disp-60 tablet, R-0, E-Prescribe             Allergies   Allergies   Allergen Reactions    Acetaminophen Unknown     impaired liver function, Other reaction(s): Other (see comments), impaired liver function    Dilaudid [Hydromorphone] Nausea     Data   Most Recent 3 CBC's:  Recent Labs   Lab Test 08/13/23  0636 08/12/23  0709 08/11/23  1757 08/11/23  1206 08/11/23  0720   WBC 5.4 5.0  --   --  5.4   HGB 9.4* 9.3* 9.6*   < > 9.4*   * 100  --   --  103*   * 95*  --   --  80*    < > = values in this interval not displayed.      Most Recent 3 BMP's:  Recent Labs   Lab Test 08/13/23  0636 08/12/23  0709 08/11/23  1329 08/11/23  0720    134*  --  139   POTASSIUM 3.6 4.3  --  3.7   CHLORIDE 99 97*  --  99   CO2 28 29  --  23   BUN 8.8 8.4  --  11.1   CR 0.53 0.47*  --  0.42*   ANIONGAP 9 8  --  17*   NORA 8.2* 8.6  --  8.3*   * 164* 133* 55*     Most Recent 2 LFT's:  Recent Labs   Lab Test 08/13/23  0636 08/12/23  0709   * 119*   ALT 31 26   ALKPHOS 222* 226*   BILITOTAL 2.4* 2.6*     Most Recent INR's and Anticoagulation Dosing History:  Anticoagulation Dose History          Latest Ref Rng & Units 9/25/2019 6/17/2023 6/18/2023   Recent Dosing and Labs   INR 0.85 - 1.15 1.07  1.21  1.29      Most Recent 3 Troponin's:No lab results found.  Most Recent Cholesterol Panel:  Recent Labs   Lab Test 08/14/19  1043   CHOL 229*      HDL 96   TRIG 93     Most Recent 6 Bacteria Isolates From Any Culture (See EPIC Reports for Culture Details):No lab results found.  Most Recent TSH, T4 and A1c Labs:No lab results found.

## 2023-09-08 ENCOUNTER — TRANSFERRED RECORDS (OUTPATIENT)
Dept: HEALTH INFORMATION MANAGEMENT | Facility: CLINIC | Age: 28
End: 2023-09-08
Payer: COMMERCIAL

## 2023-11-24 ENCOUNTER — WALK IN (OUTPATIENT)
Dept: URGENT CARE | Age: 28
End: 2023-11-24
Attending: EMERGENCY MEDICINE

## 2023-11-24 VITALS
DIASTOLIC BLOOD PRESSURE: 89 MMHG | BODY MASS INDEX: 20.97 KG/M2 | RESPIRATION RATE: 12 BRPM | SYSTOLIC BLOOD PRESSURE: 133 MMHG | OXYGEN SATURATION: 98 % | HEART RATE: 86 BPM | WEIGHT: 126 LBS | TEMPERATURE: 97.6 F

## 2023-11-24 DIAGNOSIS — L30.9 DERMATITIS: Primary | ICD-10-CM

## 2023-11-24 PROCEDURE — 99212 OFFICE O/P EST SF 10 MIN: CPT

## 2023-11-24 RX ORDER — NYSTATIN 100000 U/G
1 CREAM TOPICAL 2 TIMES DAILY
Qty: 30 G | Refills: 1 | Status: SHIPPED | OUTPATIENT
Start: 2023-11-24

## 2023-11-24 ASSESSMENT — PAIN SCALES - GENERAL: PAINLEVEL: 2

## 2023-12-08 ENCOUNTER — WALK IN (OUTPATIENT)
Dept: URGENT CARE | Age: 28
End: 2023-12-08
Attending: EMERGENCY MEDICINE

## 2023-12-08 VITALS
OXYGEN SATURATION: 98 % | DIASTOLIC BLOOD PRESSURE: 82 MMHG | WEIGHT: 130 LBS | RESPIRATION RATE: 16 BRPM | BODY MASS INDEX: 21.63 KG/M2 | HEART RATE: 76 BPM | TEMPERATURE: 98.4 F | SYSTOLIC BLOOD PRESSURE: 122 MMHG

## 2023-12-08 DIAGNOSIS — R21 RASH AND NONSPECIFIC SKIN ERUPTION: Primary | ICD-10-CM

## 2023-12-08 DIAGNOSIS — T78.40XA ALLERGIC REACTION TO DRUG, INITIAL ENCOUNTER: ICD-10-CM

## 2023-12-08 RX ORDER — PREDNISONE 20 MG/1
40 TABLET ORAL DAILY
Qty: 8 TABLET | Refills: 0 | Status: SHIPPED | OUTPATIENT
Start: 2023-12-08 | End: 2023-12-12

## 2023-12-08 ASSESSMENT — PAIN SCALES - GENERAL: PAINLEVEL_OUTOF10: 0

## 2024-03-03 ENCOUNTER — HEALTH MAINTENANCE LETTER (OUTPATIENT)
Age: 29
End: 2024-03-03

## 2024-04-21 ENCOUNTER — HOSPITAL ENCOUNTER (EMERGENCY)
Facility: CLINIC | Age: 29
Discharge: HOME | End: 2024-04-22
Attending: EMERGENCY MEDICINE | Admitting: EMERGENCY MEDICINE
Payer: COMMERCIAL

## 2024-04-21 DIAGNOSIS — E83.42 HYPOMAGNESEMIA: ICD-10-CM

## 2024-04-21 DIAGNOSIS — D64.9 CHRONIC ANEMIA: ICD-10-CM

## 2024-04-21 DIAGNOSIS — S09.90XA CLOSED HEAD INJURY, INITIAL ENCOUNTER: ICD-10-CM

## 2024-04-21 DIAGNOSIS — R55 SYNCOPE, UNSPECIFIED SYNCOPE TYPE: ICD-10-CM

## 2024-04-21 DIAGNOSIS — F10.929 ALCOHOLIC INTOXICATION WITH COMPLICATION (H): ICD-10-CM

## 2024-04-21 DIAGNOSIS — K70.10 ALCOHOLIC HEPATITIS, UNSPECIFIED WHETHER ASCITES PRESENT (H): ICD-10-CM

## 2024-04-21 PROCEDURE — 99285 EMERGENCY DEPT VISIT HI MDM: CPT | Mod: 25

## 2024-04-21 PROCEDURE — 93005 ELECTROCARDIOGRAM TRACING: CPT

## 2024-04-21 ASSESSMENT — COLUMBIA-SUICIDE SEVERITY RATING SCALE - C-SSRS
1. IN THE PAST MONTH, HAVE YOU WISHED YOU WERE DEAD OR WISHED YOU COULD GO TO SLEEP AND NOT WAKE UP?: NO
2. HAVE YOU ACTUALLY HAD ANY THOUGHTS OF KILLING YOURSELF IN THE PAST MONTH?: NO
6. HAVE YOU EVER DONE ANYTHING, STARTED TO DO ANYTHING, OR PREPARED TO DO ANYTHING TO END YOUR LIFE?: NO

## 2024-04-22 ENCOUNTER — APPOINTMENT (OUTPATIENT)
Dept: CT IMAGING | Facility: CLINIC | Age: 29
End: 2024-04-22
Attending: EMERGENCY MEDICINE
Payer: COMMERCIAL

## 2024-04-22 VITALS
TEMPERATURE: 99 F | WEIGHT: 130 LBS | BODY MASS INDEX: 20.89 KG/M2 | HEIGHT: 66 IN | SYSTOLIC BLOOD PRESSURE: 126 MMHG | RESPIRATION RATE: 16 BRPM | OXYGEN SATURATION: 94 % | DIASTOLIC BLOOD PRESSURE: 82 MMHG | HEART RATE: 98 BPM

## 2024-04-22 LAB
ALBUMIN SERPL BCG-MCNC: 4.1 G/DL (ref 3.5–5.2)
ALBUMIN UR-MCNC: NEGATIVE MG/DL
ALP SERPL-CCNC: 295 U/L (ref 40–150)
ALT SERPL W P-5'-P-CCNC: 37 U/L (ref 0–50)
ANION GAP SERPL CALCULATED.3IONS-SCNC: 14 MMOL/L (ref 7–15)
APPEARANCE UR: CLEAR
AST SERPL W P-5'-P-CCNC: 182 U/L (ref 0–45)
ATRIAL RATE - MUSE: 112 BPM
BACTERIA #/AREA URNS HPF: ABNORMAL /HPF
BASOPHILS # BLD AUTO: 0.1 10E3/UL (ref 0–0.2)
BASOPHILS NFR BLD AUTO: 1 %
BILIRUB SERPL-MCNC: 1.7 MG/DL
BILIRUB UR QL STRIP: NEGATIVE
BUN SERPL-MCNC: 3.5 MG/DL (ref 6–20)
CALCIUM SERPL-MCNC: 8.8 MG/DL (ref 8.6–10)
CHLORIDE SERPL-SCNC: 101 MMOL/L (ref 98–107)
COLOR UR AUTO: ABNORMAL
CREAT SERPL-MCNC: 0.41 MG/DL (ref 0.51–0.95)
DEPRECATED HCO3 PLAS-SCNC: 25 MMOL/L (ref 22–29)
DIASTOLIC BLOOD PRESSURE - MUSE: NORMAL MMHG
EGFRCR SERPLBLD CKD-EPI 2021: >90 ML/MIN/1.73M2
EOSINOPHIL # BLD AUTO: 0.2 10E3/UL (ref 0–0.7)
EOSINOPHIL NFR BLD AUTO: 2 %
ERYTHROCYTE [DISTWIDTH] IN BLOOD BY AUTOMATED COUNT: 18.4 % (ref 10–15)
ETHANOL SERPL-MCNC: 0.36 G/DL
GLUCOSE SERPL-MCNC: 108 MG/DL (ref 70–99)
GLUCOSE UR STRIP-MCNC: NEGATIVE MG/DL
HCG SERPL QL: NEGATIVE
HCT VFR BLD AUTO: 29.7 % (ref 35–47)
HGB BLD-MCNC: 9.6 G/DL (ref 11.7–15.7)
HGB UR QL STRIP: NEGATIVE
IMM GRANULOCYTES # BLD: 0 10E3/UL
IMM GRANULOCYTES NFR BLD: 0 %
INTERPRETATION ECG - MUSE: NORMAL
KETONES UR STRIP-MCNC: NEGATIVE MG/DL
LACTATE SERPL-SCNC: 1.9 MMOL/L (ref 0.7–2)
LEUKOCYTE ESTERASE UR QL STRIP: NEGATIVE
LIPASE SERPL-CCNC: 66 U/L (ref 13–60)
LYMPHOCYTES # BLD AUTO: 2.4 10E3/UL (ref 0.8–5.3)
LYMPHOCYTES NFR BLD AUTO: 32 %
MAGNESIUM SERPL-MCNC: 1.4 MG/DL (ref 1.7–2.3)
MCH RBC QN AUTO: 30.5 PG (ref 26.5–33)
MCHC RBC AUTO-ENTMCNC: 32.3 G/DL (ref 31.5–36.5)
MCV RBC AUTO: 94 FL (ref 78–100)
MONOCYTES # BLD AUTO: 0.7 10E3/UL (ref 0–1.3)
MONOCYTES NFR BLD AUTO: 9 %
NEUTROPHILS # BLD AUTO: 4.2 10E3/UL (ref 1.6–8.3)
NEUTROPHILS NFR BLD AUTO: 56 %
NITRATE UR QL: NEGATIVE
NRBC # BLD AUTO: 0 10E3/UL
NRBC BLD AUTO-RTO: 0 /100
P AXIS - MUSE: 83 DEGREES
PH UR STRIP: 6.5 [PH] (ref 5–7)
PLATELET # BLD AUTO: 109 10E3/UL (ref 150–450)
POTASSIUM SERPL-SCNC: 3.5 MMOL/L (ref 3.4–5.3)
PR INTERVAL - MUSE: 178 MS
PROT SERPL-MCNC: 7.9 G/DL (ref 6.4–8.3)
QRS DURATION - MUSE: 94 MS
QT - MUSE: 348 MS
QTC - MUSE: 475 MS
R AXIS - MUSE: 34 DEGREES
RBC # BLD AUTO: 3.15 10E6/UL (ref 3.8–5.2)
RBC URINE: <1 /HPF
SODIUM SERPL-SCNC: 140 MMOL/L (ref 135–145)
SP GR UR STRIP: 1 (ref 1–1.03)
SYSTOLIC BLOOD PRESSURE - MUSE: NORMAL MMHG
T AXIS - MUSE: 33 DEGREES
TROPONIN T SERPL HS-MCNC: <6 NG/L
UROBILINOGEN UR STRIP-MCNC: NORMAL MG/DL
VENTRICULAR RATE- MUSE: 112 BPM
WBC # BLD AUTO: 7.5 10E3/UL (ref 4–11)
WBC URINE: <1 /HPF

## 2024-04-22 PROCEDURE — 70450 CT HEAD/BRAIN W/O DYE: CPT

## 2024-04-22 PROCEDURE — 250N000011 HC RX IP 250 OP 636: Performed by: EMERGENCY MEDICINE

## 2024-04-22 PROCEDURE — 80053 COMPREHEN METABOLIC PANEL: CPT | Performed by: EMERGENCY MEDICINE

## 2024-04-22 PROCEDURE — 36415 COLL VENOUS BLD VENIPUNCTURE: CPT | Performed by: EMERGENCY MEDICINE

## 2024-04-22 PROCEDURE — 84484 ASSAY OF TROPONIN QUANT: CPT | Performed by: EMERGENCY MEDICINE

## 2024-04-22 PROCEDURE — 82077 ASSAY SPEC XCP UR&BREATH IA: CPT | Performed by: EMERGENCY MEDICINE

## 2024-04-22 PROCEDURE — 96361 HYDRATE IV INFUSION ADD-ON: CPT

## 2024-04-22 PROCEDURE — 81001 URINALYSIS AUTO W/SCOPE: CPT | Performed by: EMERGENCY MEDICINE

## 2024-04-22 PROCEDURE — 83690 ASSAY OF LIPASE: CPT | Performed by: EMERGENCY MEDICINE

## 2024-04-22 PROCEDURE — 258N000003 HC RX IP 258 OP 636: Performed by: EMERGENCY MEDICINE

## 2024-04-22 PROCEDURE — 85025 COMPLETE CBC W/AUTO DIFF WBC: CPT | Performed by: EMERGENCY MEDICINE

## 2024-04-22 PROCEDURE — 83605 ASSAY OF LACTIC ACID: CPT | Performed by: EMERGENCY MEDICINE

## 2024-04-22 PROCEDURE — 83735 ASSAY OF MAGNESIUM: CPT | Performed by: EMERGENCY MEDICINE

## 2024-04-22 PROCEDURE — 96365 THER/PROPH/DIAG IV INF INIT: CPT

## 2024-04-22 PROCEDURE — 84703 CHORIONIC GONADOTROPIN ASSAY: CPT | Performed by: EMERGENCY MEDICINE

## 2024-04-22 PROCEDURE — 72125 CT NECK SPINE W/O DYE: CPT

## 2024-04-22 RX ORDER — MAGNESIUM SULFATE HEPTAHYDRATE 40 MG/ML
2 INJECTION, SOLUTION INTRAVENOUS ONCE
Status: COMPLETED | OUTPATIENT
Start: 2024-04-22 | End: 2024-04-22

## 2024-04-22 RX ADMIN — MAGNESIUM SULFATE HEPTAHYDRATE 2 G: 2 INJECTION, SOLUTION INTRAVENOUS at 00:59

## 2024-04-22 RX ADMIN — SODIUM CHLORIDE 1000 ML: 9 INJECTION, SOLUTION INTRAVENOUS at 00:08

## 2024-04-22 ASSESSMENT — ACTIVITIES OF DAILY LIVING (ADL)
ADLS_ACUITY_SCORE: 35
ADLS_ACUITY_SCORE: 35

## 2024-04-22 NOTE — ED PROVIDER NOTES
History     Chief Complaint:  Syncope     HPI   Tanika Chau is a 28 year old female with history as indicated below who presents to the emergency department for a syncopal episode. She hit the right side of her head on the dryer and was unwitnessed as her boyfriend was at work. When he saw her, she was sitting with heavy breathing and making snorting/grunting sounds. He noticed her pupils to be bigger in addition to a somewhat decreased function of motor skills. Tanika had a subsequent near syncopal episode at about 2300. Chest pain endorsed in addition, as well as tearful while at home. Patient complains of numbness on her left side beginning yesterday, though is uncertain of why.  Patient has been drinking alcohol states she probably had 5 drinks today.  She denies melena or hematochezia.  No hematemesis.  Denies chance of pregnancy.    Independent Historian:   The patient is present with her boyfriend, helping to contribute to the history as described above.    Review of External Notes:   I reviewed the discharge summary from 3/9/24.    I reviewed the discharge summary from 12/1/23 where the patient saw infectious disease and dermatology.    Medications:    No current outpatient medications on file.      Past Medical History:    Past Medical History:   Diagnosis Date    Alcohol abuse     Alcoholic cirrhosis of liver     Anemia     Anxiety     Esophageal varices     Gastric diverticulum     Psoriasis        Past Surgical History:    Past Surgical History:   Procedure Laterality Date    ENDOSCOPIC RETROGRADE CHOLANGIOPANCREATOGRAM N/A 09/26/2019    Procedure: ENDOSCOPIC RETROGRADE CHOLANGIOPANCREATOGRAPHY AND PANCREATIC STENT PLACEMENT;  Surgeon: Mak Golden MD;  Location: [facility];  Service: Gastroenterology    ESOPHAGOSCOPY, GASTROSCOPY, DUODENOSCOPY (EGD), COMBINED N/A 8/11/2023    Procedure: Esophagogastroduodenoscopy with banding x 5 bands;  Surgeon: Lucy Ordoñez MD;  Location:  "[facility]    TONSILLECTOMY, ADENOIDECTOMY, COMBINED          Physical Exam   Patient Vitals for the past 24 hrs:   BP Temp Temp src Pulse Resp SpO2 Height Weight   04/22/24 0130 128/89 -- -- 114 -- 90 % -- --   04/22/24 0115  131/93 -- -- 110 -- 95 % -- --   04/22/24 0100 127/86 -- -- 113 -- 94 % -- --   04/22/24 0045 129/89 -- -- 108 -- -- -- --   04/22/24 0015 135/89 -- -- 109 -- -- -- --   04/22/24 0012 135/89 -- -- 106 -- -- -- --   04/22/24 0002  151/104 -- -- -- -- -- -- --   04/21/24 2352  152/100 -- -- -- -- 96 % -- --   04/21/24 2342  148/101 -- -- -- -- 97 % -- --   04/21/24 2331  143/98 99  F (37.2  C) Oral 110 20 96 % 1.676 m (5' 6\") 59 kg (130 lb)      Physical Exam  Nursing note and vitals reviewed.  Constitutional: Cooperative. Intoxicated appearing and slurring words.  HENT:   Mouth/Throat: Mucous membranes are normal. C collar in place.  Eyes: Pupils are equal, round, and reactive to light.  Extraocular movements intact  Cardiovascular: Tachycardic rate, regular rhythm and normal heart sounds.  No murmur.  Pulmonary/Chest: Effort normal and breath sounds normal. No respiratory distress. No wheezes.  Abdominal: Soft. Normal appearance and bowel sounds are normal. No distension. Diffuse abdominal tenderness limited by patient's apprehension.    Musculoskeletal: Normal range of motion.   Neurological: Alert.  GCS 14 (slurring words).  Strength normal in all extremities.  Cranial nerves II through XII intact.  Skin: Skin is warm and dry. Chronic appearing rash in scaly patches diffusely on extremities.    Emergency Department Course   ECG  ECG taken at 2345, ECG read at 2352  Sinus tachycardia  Rate 112 bpm. DC interval 178 ms. QRS duration 94 ms. QT/QTc 348/475 ms. P-R-T axes 83 34 33.     Imaging:  CT scan of the head and neck: No fracture or other abnormality per radiology    Laboratory:  Labs Ordered and Resulted from Time of ED Arrival to Time of ED Departure   ETHYL ALCOHOL LEVEL - Abnormal       " Result Value    Alcohol ethyl 0.36 (*)    COMPREHENSIVE METABOLIC PANEL - Abnormal    Sodium 140      Potassium 3.5      Carbon Dioxide (CO2) 25      Anion Gap 14      Urea Nitrogen 3.5 (*)     Creatinine 0.41 (*)     GFR Estimate >90      Calcium 8.8      Chloride 101      Glucose 108 (*)     Alkaline Phosphatase 295 (*)      (*)     ALT 37      Protein Total 7.9      Albumin 4.1      Bilirubin Total 1.7 (*)    LIPASE - Abnormal    Lipase 66 (*)    MAGNESIUM - Abnormal    Magnesium 1.4 (*)    ROUTINE UA WITH MICROSCOPIC - Abnormal    Color Urine Straw      Appearance Urine Clear      Glucose Urine Negative      Bilirubin Urine Negative      Ketones Urine Negative      Specific Gravity Urine 1.003      Blood Urine Negative      pH Urine 6.5      Protein Albumin Urine Negative      Urobilinogen Urine Normal      Nitrite Urine Negative      Leukocyte Esterase Urine Negative      Bacteria Urine Few (*)     RBC Urine <1      WBC Urine <1     CBC WITH PLATELETS AND DIFFERENTIAL - Abnormal    WBC Count 7.5      RBC Count 3.15 (*)     Hemoglobin 9.6 (*)     Hematocrit 29.7 (*)     MCV 94      MCH 30.5      MCHC 32.3      RDW 18.4 (*)     Platelet Count 109 (*)     % Neutrophils 56      % Lymphocytes 32      % Monocytes 9      % Eosinophils 2      % Basophils 1      % Immature Granulocytes 0      NRBCs per 100 WBC 0      Absolute Neutrophils 4.2      Absolute Lymphocytes 2.4      Absolute Monocytes 0.7      Absolute Eosinophils 0.2      Absolute Basophils 0.1      Absolute Immature Granulocytes 0.0      Absolute NRBCs 0.0     TROPONIN T, HIGH SENSITIVITY - Normal    Troponin T, High Sensitivity <6     LACTIC ACID WHOLE BLOOD - Normal    Lactic Acid 1.9     HCG QUALITATIVE PREGNANCY - Normal    hCG Serum Qualitative Negative        Interventions:  Medications   sodium chloride 0.9% BOLUS 1,000 mL ( Intravenous Restarted 4/22/24 0133)   magnesium sulfate 2 g in 50 mL sterile water intermittent infusion ( Intravenous  Restarted 4/22/24 0133)      Independent Interpretation (X-rays, CTs, rhythm strip):  None    Assessments/Consultations/Discussion of Management or Tests:    ED Course as of 04/22/24 0221   Sun Apr 21, 2024   2348 My medical student, Katie, saw the patient for an initial exam and evaluation.   Mon Apr 22, 2024   0001 I saw the patient for an initial evaluation and exam.   0134 I rechecked and updated the patient.   0221 Patient declines detox. Will be discharged to home.     Social Determinants of Health affecting care:   Ongoing daily alcohol use significantly impacting health care    Disposition:  The patient was discharged to home.     Impression & Plan      Medical Decision Making:  Is a 28-year-old female who struggles with ongoing alcohol abuse and dependence who presents significantly intoxicated with a blood alcohol of 0.36.  She did have a syncopal event at home.  EKG and troponin are reassuring.  I doubt an acute cardiogenic process as I feel this ultimately is likely related to her alcohol use.  Labs show chronic and stable anemia as well as elevated liver function tests.  She was slightly hypomagnesemic which was repleted.  Fluids administered.  Vital signs reassuring.  No fever or signs of infection.  She is not pregnant.  CT scan of the head and neck was unremarkable.  After reassuring workup I did encourage her to allow us to contact the detox center who did have a bed available but she is declining transfer in favor of going home with a sober family member.  She knows the emergency department is always here should she change her mind as we are happy to care for her in the future    Diagnosis:    ICD-10-CM    1. Syncope, unspecified syncope type  R55       2. Closed head injury, initial encounter  S09.90XA       3. Hypomagnesemia  E83.42       4. Chronic anemia  D64.9       5. Alcoholic hepatitis  K70.10       6. Alcoholic intoxication with complication (H24)  F10.929          Scribe Disclosure:  I,  Fawad Barrera, am serving as a scribe at 11:45 PM on 4/21/2024 to document services personally performed by Charli Ma MD based on my observations and the provider's statements to me.     Charli Ma MD  04/22/24 0302

## 2024-04-22 NOTE — ED TRIAGE NOTES
"Pt reports she fainted twice today, last time was PTA while doing laundry. Pt has been lightheaded for the last couple weeks and reports a \"weird\" rash all over her body. Pt has been seen for the rash. Pt reports ETOH use 1 hr ago. C-collar applied in triage        "
Fall with Harm Risk

## 2024-11-24 ENCOUNTER — WALK IN (OUTPATIENT)
Dept: URGENT CARE | Age: 29
End: 2024-11-24

## 2024-11-24 VITALS
WEIGHT: 140 LBS | BODY MASS INDEX: 22.5 KG/M2 | HEIGHT: 66 IN | RESPIRATION RATE: 17 BRPM | SYSTOLIC BLOOD PRESSURE: 125 MMHG | HEART RATE: 95 BPM | OXYGEN SATURATION: 98 % | DIASTOLIC BLOOD PRESSURE: 80 MMHG | TEMPERATURE: 98 F

## 2024-11-24 LAB
FLUAV AG UPPER RESP QL IA.RAPID: NEGATIVE
FLUBV AG UPPER RESP QL IA.RAPID: NEGATIVE
INTERNAL PROCEDURAL CONTROLS ACCEPTABLE: YES
S PYO AG THROAT QL IA.RAPID: NEGATIVE
SARS-COV+SARS-COV-2 AG RESP QL IA.RAPID: NOT DETECTED
TEST LOT EXPIRATION DATE: NORMAL
TEST LOT EXPIRATION DATE: NORMAL
TEST LOT NUMBER: NORMAL
TEST LOT NUMBER: NORMAL

## 2024-11-24 PROCEDURE — 87880 STREP A ASSAY W/OPTIC: CPT | Performed by: NURSE PRACTITIONER

## 2024-11-24 PROCEDURE — 87428 SARSCOV & INF VIR A&B AG IA: CPT | Performed by: NURSE PRACTITIONER

## 2025-02-04 ENCOUNTER — NURSE TRIAGE (OUTPATIENT)
Dept: TELEHEALTH | Age: 30
End: 2025-02-04

## 2025-04-06 ENCOUNTER — HEALTH MAINTENANCE LETTER (OUTPATIENT)
Age: 30
End: 2025-04-06

## 2025-04-25 ENCOUNTER — OCC HEALTH (OUTPATIENT)
Dept: OCCUPATIONAL MEDICINE | Age: 30
End: 2025-04-25

## 2025-04-25 DIAGNOSIS — Z00.8 HEALTH EXAMINATION IN POPULATION SURVEYS: Primary | ICD-10-CM

## 2025-04-25 PROCEDURE — OH021 PRE PLACEMENT PHYSICAL EXAM: Performed by: EMERGENCY MEDICINE

## 2025-05-21 ENCOUNTER — APPOINTMENT (OUTPATIENT)
Dept: CT IMAGING | Age: 30
DRG: 053 | End: 2025-05-21
Attending: EMERGENCY MEDICINE

## 2025-05-21 ENCOUNTER — HOSPITAL ENCOUNTER (INPATIENT)
Age: 30
Discharge: HOME | DRG: 053 | End: 2025-05-21
Attending: EMERGENCY MEDICINE | Admitting: INTERNAL MEDICINE

## 2025-05-21 DIAGNOSIS — E83.42 HYPOMAGNESEMIA: ICD-10-CM

## 2025-05-21 DIAGNOSIS — R56.9 SEIZURE-LIKE ACTIVITY  (CMD): Primary | ICD-10-CM

## 2025-05-21 DIAGNOSIS — E86.0 DEHYDRATION: ICD-10-CM

## 2025-05-21 DIAGNOSIS — F10.920 ALCOHOLIC INTOXICATION WITHOUT COMPLICATION (CMD): ICD-10-CM

## 2025-05-21 DIAGNOSIS — R17 JAUNDICE: ICD-10-CM

## 2025-05-21 DIAGNOSIS — D61.818 PANCYTOPENIA (CMD): ICD-10-CM

## 2025-05-21 DIAGNOSIS — D69.6 THROMBOCYTOPENIA (CMD): ICD-10-CM

## 2025-05-21 PROBLEM — K70.40 ALCOHOLIC LIVER FAILURE  (CMD): Status: ACTIVE | Noted: 2025-05-21

## 2025-05-21 LAB
ALBUMIN SERPL-MCNC: 2.2 G/DL (ref 3.4–5)
ALBUMIN/GLOB SERPL: 0.6 {RATIO} (ref 1–2.4)
ALP SERPL-CCNC: 200 UNITS/L (ref 45–117)
ALT SERPL-CCNC: 70 UNITS/L
AMMONIA PLAS-SCNC: <10 MCMOL/L
ANION GAP SERPL CALC-SCNC: 11 MMOL/L (ref 7–19)
APTT PPP: 45 SEC (ref 22–32)
AST SERPL-CCNC: 156 UNITS/L
ATRIAL RATE (BPM): 131
BASOPHILS # BLD: 0 K/MCL (ref 0–0.3)
BASOPHILS NFR BLD: 1 %
BILIRUB SERPL-MCNC: 17.1 MG/DL (ref 0.2–1)
BUN SERPL-MCNC: 8 MG/DL (ref 6–20)
BUN/CREAT SERPL: 16 (ref 7–25)
CALCIUM SERPL-MCNC: 7.8 MG/DL (ref 8.4–10.2)
CHLORIDE SERPL-SCNC: 110 MMOL/L (ref 97–110)
CO2 SERPL-SCNC: 25 MMOL/L (ref 21–32)
CREAT SERPL-MCNC: 0.5 MG/DL (ref 0.51–0.95)
DEPRECATED RDW RBC: 52.7 FL (ref 39–50)
EGFRCR SERPLBLD CKD-EPI 2021: >90 ML/MIN/{1.73_M2}
EOSINOPHIL # BLD: 0.1 K/MCL (ref 0–0.5)
EOSINOPHIL NFR BLD: 1 %
ERYTHROCYTE [DISTWIDTH] IN BLOOD: 13.2 % (ref 11–15)
ETHANOL SERPL-MCNC: 319 MG/DL
FASTING DURATION TIME PATIENT: ABNORMAL H
GLOBULIN SER-MCNC: 3.5 G/DL (ref 2–4)
GLUCOSE SERPL-MCNC: 119 MG/DL (ref 70–99)
HCG SERPL-ACNC: <3 MUNITS/ML
HCT VFR BLD CALC: 25.8 % (ref 36–46.5)
HGB BLD-MCNC: 8.6 G/DL (ref 12–15.5)
IMM GRANULOCYTES # BLD AUTO: 0.1 K/MCL (ref 0–0.2)
IMM GRANULOCYTES # BLD: 1 %
INR PPP: 2.3
LYMPHOCYTES # BLD: 1.1 K/MCL (ref 1–4.8)
LYMPHOCYTES NFR BLD: 16 %
MAGNESIUM SERPL-MCNC: 1.5 MG/DL (ref 1.7–2.4)
MCH RBC QN AUTO: 37.4 PG (ref 26–34)
MCHC RBC AUTO-ENTMCNC: 33.3 G/DL (ref 32–36.5)
MCV RBC AUTO: 112.2 FL (ref 78–100)
MONOCYTES # BLD: 0.4 K/MCL (ref 0.3–0.9)
MONOCYTES NFR BLD: 6 %
NEUTROPHILS # BLD: 5 K/MCL (ref 1.8–7.7)
NEUTROPHILS NFR BLD: 75 %
NRBC BLD MANUAL-RTO: 0 /100 WBC
P AXIS (DEGREES): 77
PLATELET # BLD AUTO: 51 K/MCL (ref 140–450)
POTASSIUM SERPL-SCNC: 3.6 MMOL/L (ref 3.4–5.1)
PR-INTERVAL (MSEC): 134
PROT SERPL-MCNC: 5.7 G/DL (ref 6.4–8.2)
PROTHROMBIN TIME: 22.5 SEC (ref 9.7–11.8)
QRS-INTERVAL (MSEC): 114
QT-INTERVAL (MSEC): 384
QTC: 567
R AXIS (DEGREES): 39
RBC # BLD: 2.3 MIL/MCL (ref 4–5.2)
REPORT TEXT: NORMAL
SODIUM SERPL-SCNC: 142 MMOL/L (ref 135–145)
T AXIS (DEGREES): 33
VENTRICULAR RATE EKG/MIN (BPM): 131
WBC # BLD: 6.6 K/MCL (ref 4.2–11)

## 2025-05-21 PROCEDURE — 10002807 HB RX 258: Performed by: EMERGENCY MEDICINE

## 2025-05-21 PROCEDURE — 85025 COMPLETE CBC W/AUTO DIFF WBC: CPT | Performed by: EMERGENCY MEDICINE

## 2025-05-21 PROCEDURE — 82077 ASSAY SPEC XCP UR&BREATH IA: CPT | Performed by: EMERGENCY MEDICINE

## 2025-05-21 PROCEDURE — 70450 CT HEAD/BRAIN W/O DYE: CPT

## 2025-05-21 PROCEDURE — 80053 COMPREHEN METABOLIC PANEL: CPT | Performed by: EMERGENCY MEDICINE

## 2025-05-21 PROCEDURE — 99285 EMERGENCY DEPT VISIT HI MDM: CPT

## 2025-05-21 PROCEDURE — 93005 ELECTROCARDIOGRAM TRACING: CPT | Performed by: EMERGENCY MEDICINE

## 2025-05-21 PROCEDURE — 96375 TX/PRO/DX INJ NEW DRUG ADDON: CPT

## 2025-05-21 PROCEDURE — 96361 HYDRATE IV INFUSION ADD-ON: CPT

## 2025-05-21 PROCEDURE — 85610 PROTHROMBIN TIME: CPT | Performed by: EMERGENCY MEDICINE

## 2025-05-21 PROCEDURE — 74177 CT ABD & PELVIS W/CONTRAST: CPT

## 2025-05-21 PROCEDURE — 10002800 HB RX 250 W HCPCS: Performed by: EMERGENCY MEDICINE

## 2025-05-21 PROCEDURE — 96366 THER/PROPH/DIAG IV INF ADDON: CPT

## 2025-05-21 PROCEDURE — 85730 THROMBOPLASTIN TIME PARTIAL: CPT | Performed by: EMERGENCY MEDICINE

## 2025-05-21 PROCEDURE — 10002805 HB CONTRAST AGENT: Performed by: EMERGENCY MEDICINE

## 2025-05-21 PROCEDURE — 82140 ASSAY OF AMMONIA: CPT | Performed by: EMERGENCY MEDICINE

## 2025-05-21 PROCEDURE — 84702 CHORIONIC GONADOTROPIN TEST: CPT | Performed by: EMERGENCY MEDICINE

## 2025-05-21 PROCEDURE — 96365 THER/PROPH/DIAG IV INF INIT: CPT

## 2025-05-21 PROCEDURE — 83735 ASSAY OF MAGNESIUM: CPT | Performed by: EMERGENCY MEDICINE

## 2025-05-21 PROCEDURE — 10003585 HB ROOM CHARGE INTERMEDIATE

## 2025-05-21 PROCEDURE — HZ2ZZZZ DETOXIFICATION SERVICES FOR SUBSTANCE ABUSE TREATMENT: ICD-10-PCS | Performed by: INTERNAL MEDICINE

## 2025-05-21 RX ORDER — LORAZEPAM 2 MG/ML
2 INJECTION INTRAMUSCULAR ONCE
Status: COMPLETED | OUTPATIENT
Start: 2025-05-21 | End: 2025-05-21

## 2025-05-21 RX ORDER — MAGNESIUM SULFATE 4 G/50ML
4 INJECTION INTRAVENOUS ONCE
Status: COMPLETED | OUTPATIENT
Start: 2025-05-21 | End: 2025-05-22

## 2025-05-21 RX ORDER — DROPERIDOL 2.5 MG/ML
5 INJECTION, SOLUTION INTRAMUSCULAR; INTRAVENOUS ONCE
Status: COMPLETED | OUTPATIENT
Start: 2025-05-21 | End: 2025-05-21

## 2025-05-21 RX ADMIN — DROPERIDOL 5 MG: 2.5 INJECTION, SOLUTION INTRAMUSCULAR; INTRAVENOUS at 17:28

## 2025-05-21 RX ADMIN — SODIUM CHLORIDE 1000 ML: 9 INJECTION, SOLUTION INTRAVENOUS at 16:33

## 2025-05-21 RX ADMIN — IOHEXOL 80 ML: 350 INJECTION, SOLUTION INTRAVENOUS at 19:51

## 2025-05-21 RX ADMIN — LORAZEPAM 2 MG: 2 INJECTION INTRAMUSCULAR; INTRAVENOUS at 16:30

## 2025-05-21 RX ADMIN — MAGNESIUM SULFATE HEPTAHYDRATE 4 G: 80 INJECTION, SOLUTION INTRAVENOUS at 18:15

## 2025-05-22 ENCOUNTER — APPOINTMENT (OUTPATIENT)
Dept: NEUROLOGY | Age: 30
DRG: 053 | End: 2025-05-22
Attending: PSYCHIATRY & NEUROLOGY

## 2025-05-22 ENCOUNTER — APPOINTMENT (OUTPATIENT)
Dept: GENERAL RADIOLOGY | Age: 30
DRG: 053 | End: 2025-05-22
Attending: INTERNAL MEDICINE

## 2025-05-22 LAB
ALBUMIN SERPL-MCNC: 1.9 G/DL (ref 3.4–5)
ALBUMIN/GLOB SERPL: 0.6 {RATIO} (ref 1–2.4)
ALP SERPL-CCNC: 175 UNITS/L (ref 45–117)
ALT SERPL-CCNC: 63 UNITS/L
ANION GAP SERPL CALC-SCNC: 8 MMOL/L (ref 7–19)
AST SERPL-CCNC: 149 UNITS/L
ATRIAL RATE (BPM): 136
BILIRUB SERPL-MCNC: 16.6 MG/DL (ref 0.2–1)
BUN SERPL-MCNC: 8 MG/DL (ref 6–20)
BUN/CREAT SERPL: 17 (ref 7–25)
CALCIUM SERPL-MCNC: 7.6 MG/DL (ref 8.4–10.2)
CHLORIDE SERPL-SCNC: 112 MMOL/L (ref 97–110)
CO2 SERPL-SCNC: 27 MMOL/L (ref 21–32)
CREAT SERPL-MCNC: 0.48 MG/DL (ref 0.51–0.95)
DEPRECATED RDW RBC: 53.5 FL (ref 39–50)
EGFRCR SERPLBLD CKD-EPI 2021: >90 ML/MIN/{1.73_M2}
ERYTHROCYTE [DISTWIDTH] IN BLOOD: 13.2 % (ref 11–15)
FASTING DURATION TIME PATIENT: ABNORMAL H
GLOBULIN SER-MCNC: 3.3 G/DL (ref 2–4)
GLUCOSE BLDC GLUCOMTR-MCNC: 112 MG/DL (ref 70–99)
GLUCOSE SERPL-MCNC: 104 MG/DL (ref 70–99)
HCT VFR BLD CALC: 21.2 % (ref 36–46.5)
HGB BLD-MCNC: 7 G/DL (ref 12–15.5)
MAGNESIUM SERPL-MCNC: 2 MG/DL (ref 1.7–2.4)
MCH RBC QN AUTO: 37.6 PG (ref 26–34)
MCHC RBC AUTO-ENTMCNC: 33 G/DL (ref 32–36.5)
MCV RBC AUTO: 114 FL (ref 78–100)
NRBC BLD MANUAL-RTO: 0 /100 WBC
P AXIS (DEGREES): 82
PLATELET # BLD AUTO: 37 K/MCL (ref 140–450)
POTASSIUM SERPL-SCNC: 4.3 MMOL/L (ref 3.4–5.1)
PR-INTERVAL (MSEC): 142
PROT SERPL-MCNC: 5.2 G/DL (ref 6.4–8.2)
QRS-INTERVAL (MSEC): 82
QT-INTERVAL (MSEC): 292
QTC: 439
R AXIS (DEGREES): 70
RAINBOW EXTRA TUBES HOLD SPECIMEN: NORMAL
RBC # BLD: 1.86 MIL/MCL (ref 4–5.2)
REPORT TEXT: NORMAL
SODIUM SERPL-SCNC: 143 MMOL/L (ref 135–145)
T AXIS (DEGREES): 20
VENTRICULAR RATE EKG/MIN (BPM): 136
WBC # BLD: 3.8 K/MCL (ref 4.2–11)

## 2025-05-22 PROCEDURE — 95819 EEG AWAKE AND ASLEEP: CPT

## 2025-05-22 PROCEDURE — 10002800 HB RX 250 W HCPCS: Performed by: INTERNAL MEDICINE

## 2025-05-22 PROCEDURE — 10006031 HB ROOM CHARGE TELEMETRY

## 2025-05-22 PROCEDURE — 85027 COMPLETE CBC AUTOMATED: CPT | Performed by: INTERNAL MEDICINE

## 2025-05-22 PROCEDURE — 10002803 HB RX 637: Performed by: INTERNAL MEDICINE

## 2025-05-22 PROCEDURE — 80053 COMPREHEN METABOLIC PANEL: CPT | Performed by: INTERNAL MEDICINE

## 2025-05-22 PROCEDURE — 36415 COLL VENOUS BLD VENIPUNCTURE: CPT | Performed by: INTERNAL MEDICINE

## 2025-05-22 PROCEDURE — 10002807 HB RX 258: Performed by: INTERNAL MEDICINE

## 2025-05-22 PROCEDURE — 83735 ASSAY OF MAGNESIUM: CPT | Performed by: INTERNAL MEDICINE

## 2025-05-22 PROCEDURE — 10004651 HB RX, NO CHARGE ITEM: Performed by: INTERNAL MEDICINE

## 2025-05-22 PROCEDURE — 71045 X-RAY EXAM CHEST 1 VIEW: CPT

## 2025-05-22 PROCEDURE — 10002803 HB RX 637: Performed by: FAMILY MEDICINE

## 2025-05-22 PROCEDURE — 99223 1ST HOSP IP/OBS HIGH 75: CPT | Performed by: INTERNAL MEDICINE

## 2025-05-22 PROCEDURE — 99254 IP/OBS CNSLTJ NEW/EST MOD 60: CPT | Performed by: INTERNAL MEDICINE

## 2025-05-22 RX ORDER — CALCIUM CARBONATE 500 MG/1
500 TABLET, CHEWABLE ORAL EVERY 4 HOURS PRN
Status: DISCONTINUED | OUTPATIENT
Start: 2025-05-22 | End: 2025-05-27 | Stop reason: HOSPADM

## 2025-05-22 RX ORDER — TRAMADOL HYDROCHLORIDE 50 MG/1
50 TABLET ORAL EVERY 6 HOURS PRN
Status: ON HOLD | COMMUNITY
End: 2025-05-27 | Stop reason: HOSPADM

## 2025-05-22 RX ORDER — LORAZEPAM 2 MG/ML
3 INJECTION INTRAMUSCULAR
Status: DISCONTINUED | OUTPATIENT
Start: 2025-05-22 | End: 2025-05-25

## 2025-05-22 RX ORDER — POLYETHYLENE GLYCOL 3350 17 G/17G
17 POWDER, FOR SOLUTION ORAL DAILY PRN
Status: DISCONTINUED | OUTPATIENT
Start: 2025-05-22 | End: 2025-05-27 | Stop reason: HOSPADM

## 2025-05-22 RX ORDER — 0.9 % SODIUM CHLORIDE 0.9 %
2 VIAL (ML) INJECTION EVERY 12 HOURS SCHEDULED
Status: DISCONTINUED | OUTPATIENT
Start: 2025-05-22 | End: 2025-05-27 | Stop reason: HOSPADM

## 2025-05-22 RX ORDER — 0.9 % SODIUM CHLORIDE 0.9 %
10 VIAL (ML) INJECTION PRN
Status: DISCONTINUED | OUTPATIENT
Start: 2025-05-22 | End: 2025-05-27 | Stop reason: HOSPADM

## 2025-05-22 RX ORDER — PANTOPRAZOLE SODIUM 40 MG/1
40 TABLET, DELAYED RELEASE ORAL 2 TIMES DAILY
COMMUNITY

## 2025-05-22 RX ORDER — ONDANSETRON 2 MG/ML
4 INJECTION INTRAMUSCULAR; INTRAVENOUS EVERY 6 HOURS PRN
Status: DISCONTINUED | OUTPATIENT
Start: 2025-05-22 | End: 2025-05-27 | Stop reason: HOSPADM

## 2025-05-22 RX ORDER — LORAZEPAM 2 MG/1
2 TABLET ORAL
Status: DISCONTINUED | OUTPATIENT
Start: 2025-05-22 | End: 2025-05-25

## 2025-05-22 RX ORDER — PANTOPRAZOLE SODIUM 40 MG/1
40 TABLET, DELAYED RELEASE ORAL
Status: DISCONTINUED | OUTPATIENT
Start: 2025-05-22 | End: 2025-05-24 | Stop reason: CLARIF

## 2025-05-22 RX ORDER — LORAZEPAM 2 MG/ML
2 INJECTION INTRAMUSCULAR
Status: DISCONTINUED | OUTPATIENT
Start: 2025-05-22 | End: 2025-05-25

## 2025-05-22 RX ORDER — LORAZEPAM 2 MG/1
4 TABLET ORAL
Status: DISCONTINUED | OUTPATIENT
Start: 2025-05-22 | End: 2025-05-25

## 2025-05-22 RX ORDER — FOLIC ACID 1 MG/1
1 TABLET ORAL DAILY
COMMUNITY

## 2025-05-22 RX ORDER — ONDANSETRON 4 MG/1
4 TABLET, ORALLY DISINTEGRATING ORAL EVERY 12 HOURS PRN
Status: DISCONTINUED | OUTPATIENT
Start: 2025-05-22 | End: 2025-05-27 | Stop reason: HOSPADM

## 2025-05-22 RX ORDER — FUROSEMIDE 40 MG/1
40 TABLET ORAL DAILY
Status: ON HOLD | COMMUNITY
End: 2025-05-27

## 2025-05-22 RX ORDER — SODIUM CHLORIDE 9 MG/ML
INJECTION, SOLUTION INTRAVENOUS CONTINUOUS
Status: DISCONTINUED | OUTPATIENT
Start: 2025-05-22 | End: 2025-05-22

## 2025-05-22 RX ORDER — LACTULOSE 10 G/15ML
20 SOLUTION ORAL 3 TIMES DAILY
COMMUNITY

## 2025-05-22 RX ORDER — ONDANSETRON 4 MG/1
4 TABLET, FILM COATED ORAL EVERY 8 HOURS PRN
COMMUNITY

## 2025-05-22 RX ORDER — DEXTROAMPHETAMINE SACCHARATE, AMPHETAMINE ASPARTATE MONOHYDRATE, DEXTROAMPHETAMINE SULFATE AND AMPHETAMINE SULFATE 5; 5; 5; 5 MG/1; MG/1; MG/1; MG/1
20 CAPSULE, EXTENDED RELEASE ORAL DAILY
COMMUNITY

## 2025-05-22 RX ORDER — PROPRANOLOL HCL 20 MG
20 TABLET ORAL EVERY 8 HOURS SCHEDULED
Status: DISCONTINUED | OUTPATIENT
Start: 2025-05-22 | End: 2025-05-27 | Stop reason: HOSPADM

## 2025-05-22 RX ORDER — ACETAMINOPHEN 500 MG
500 TABLET ORAL EVERY 4 HOURS PRN
Status: DISCONTINUED | OUTPATIENT
Start: 2025-05-22 | End: 2025-05-22

## 2025-05-22 RX ORDER — IBUPROFEN 800 MG/1
400 TABLET, FILM COATED ORAL EVERY 6 HOURS PRN
Status: DISCONTINUED | OUTPATIENT
Start: 2025-05-22 | End: 2025-05-27 | Stop reason: HOSPADM

## 2025-05-22 RX ORDER — LORAZEPAM 2 MG/ML
4 INJECTION INTRAMUSCULAR
Status: DISCONTINUED | OUTPATIENT
Start: 2025-05-22 | End: 2025-05-25

## 2025-05-22 RX ORDER — SPIRONOLACTONE 100 MG/1
100 TABLET, FILM COATED ORAL DAILY
Status: ON HOLD | COMMUNITY
End: 2025-05-27

## 2025-05-22 RX ORDER — POTASSIUM CHLORIDE 14.9 MG/ML
20 INJECTION INTRAVENOUS ONCE
Status: COMPLETED | OUTPATIENT
Start: 2025-05-22 | End: 2025-05-22

## 2025-05-22 RX ORDER — FOLIC ACID 1 MG/1
1 TABLET ORAL DAILY
Status: DISCONTINUED | OUTPATIENT
Start: 2025-05-22 | End: 2025-05-27 | Stop reason: HOSPADM

## 2025-05-22 RX ADMIN — IBUPROFEN 400 MG: 800 TABLET, FILM COATED ORAL at 13:50

## 2025-05-22 RX ADMIN — SODIUM CHLORIDE, PRESERVATIVE FREE 2 ML: 5 INJECTION INTRAVENOUS at 20:36

## 2025-05-22 RX ADMIN — Medication 100 MG: at 08:58

## 2025-05-22 RX ADMIN — PANTOPRAZOLE SODIUM 40 MG: 40 TABLET, DELAYED RELEASE ORAL at 11:03

## 2025-05-22 RX ADMIN — LORAZEPAM 2 MG: 2 TABLET ORAL at 22:56

## 2025-05-22 RX ADMIN — FOLIC ACID 1 MG: 1 TABLET ORAL at 08:58

## 2025-05-22 RX ADMIN — POTASSIUM CHLORIDE 20 MEQ: 14.9 INJECTION, SOLUTION INTRAVENOUS at 02:59

## 2025-05-22 RX ADMIN — ANTACID TABLETS 500 MG: 500 TABLET, CHEWABLE ORAL at 14:49

## 2025-05-22 RX ADMIN — SODIUM CHLORIDE: 9 INJECTION, SOLUTION INTRAVENOUS at 00:55

## 2025-05-22 RX ADMIN — LORAZEPAM 2 MG: 2 INJECTION INTRAMUSCULAR; INTRAVENOUS at 20:37

## 2025-05-22 RX ADMIN — SODIUM CHLORIDE, PRESERVATIVE FREE 2 ML: 5 INJECTION INTRAVENOUS at 08:58

## 2025-05-22 RX ADMIN — LORAZEPAM 4 MG: 2 INJECTION INTRAMUSCULAR; INTRAVENOUS at 15:43

## 2025-05-22 RX ADMIN — POTASSIUM CHLORIDE 20 MEQ: 14.9 INJECTION, SOLUTION INTRAVENOUS at 00:56

## 2025-05-22 RX ADMIN — SODIUM CHLORIDE, PRESERVATIVE FREE 2 ML: 5 INJECTION INTRAVENOUS at 00:54

## 2025-05-22 RX ADMIN — PROPRANOLOL HYDROCHLORIDE 20 MG: 20 TABLET ORAL at 22:13

## 2025-05-22 RX ADMIN — ONDANSETRON 4 MG: 2 INJECTION INTRAMUSCULAR; INTRAVENOUS at 14:49

## 2025-05-22 SDOH — ECONOMIC STABILITY: HOUSING INSECURITY: DO YOU HAVE PROBLEMS WITH ANY OF THE FOLLOWING?: PATIENT DECLINED

## 2025-05-22 SDOH — ECONOMIC STABILITY: HOUSING INSECURITY: WHAT IS YOUR LIVING SITUATION TODAY?: I HAVE A STEADY PLACE TO LIVE

## 2025-05-22 ASSESSMENT — ENCOUNTER SYMPTOMS
RESPIRATORY NEGATIVE: 1
ABDOMINAL PAIN: 0
PSYCHIATRIC NEGATIVE: 1
FEVER: 0
DIZZINESS: 0
NEUROLOGICAL NEGATIVE: 1
BRUISES/BLEEDS EASILY: 1
DIARRHEA: 0
CHILLS: 0
NAUSEA: 0
FATIGUE: 1
VOMITING: 0
HEMATOLOGIC/LYMPHATIC NEGATIVE: 1
ENDOCRINE NEGATIVE: 1
LIGHT-HEADEDNESS: 0
SEIZURES: 1
SHORTNESS OF BREATH: 0
EYES NEGATIVE: 1

## 2025-05-22 ASSESSMENT — COGNITIVE AND FUNCTIONAL STATUS - GENERAL
BECAUSE OF A PHYSICAL, MENTAL, OR EMOTIONAL CONDITION, DO YOU HAVE DIFFICULTY DOING ERRANDS ALONE: NO
DO YOU HAVE DIFFICULTY DRESSING OR BATHING: NO
BECAUSE OF A PHYSICAL, MENTAL, OR EMOTIONAL CONDITION, DO YOU HAVE SERIOUS DIFFICULTY CONCENTRATING, REMEMBERING OR MAKING DECISIONS: NO
DO YOU HAVE SERIOUS DIFFICULTY WALKING OR CLIMBING STAIRS: NO

## 2025-05-22 ASSESSMENT — LIFESTYLE VARIABLES
VISUAL DISTURBANCES: NOT PRESENT
NAUSEA AND VOMITING: MILD NAUSEA WITH NO VOMITING
HEADACHE, FULLNESS IN HEAD: MODERATE
AUDITORY DISTURBANCES: MILD HARSHNESS OR ABILITY TO FRIGHTEN
VISUAL DISTURBANCES: NOT PRESENT
TACTILE DISTURBANCES: VERY MILD ITCHING, PINS AND NEEDLES BURNING OR NUMBNESS
HEADACHE, FULLNESS IN HEAD: MILD
AGITATION: NORMAL ACTIVITY
ANXIETY: MILDLY ANXIOUS
AGITATION: NORMAL ACTIVITY
TREMOR: NOT VISIBLE, BUT CAN BE FELT FINGERTIP TO FINGERTIP
NAUSEA AND VOMITING: MILD NAUSEA WITH NO VOMITING
NAUSEA AND VOMITING: MILD NAUSEA WITH NO VOMITING
TREMOR: NO TREMOR
AUDITORY DISTURBANCES: NOT PRESENT
NAUSEA AND VOMITING: 2
ANXIETY: MILDLY ANXIOUS
ANXIETY: NO ANXIETY, AT EASE
HEADACHE, FULLNESS IN HEAD: MODERATELY SEVERE
PAROXYSMAL SWEATS: NO SWEAT VISIBLE
AGITATION: NORMAL ACTIVITY
PAROXYSMAL SWEATS: NO SWEAT VISIBLE
PAROXYSMAL SWEATS: NO SWEAT VISIBLE
HEADACHE, FULLNESS IN HEAD: MILD
TREMOR: NO TREMOR
VISUAL DISTURBANCES: NOT PRESENT
ANXIETY: NO ANXIETY, AT EASE
TREMOR: NOT VISIBLE, BUT CAN BE FELT FINGERTIP TO FINGERTIP
PAROXYSMAL SWEATS: NO SWEAT VISIBLE
AGITATION: NORMAL ACTIVITY
TACTILE DISTURBANCES: VERY MILD ITCHING, PINS AND NEEDLES BURNING OR NUMBNESS
AUDITORY DISTURBANCES: NOT PRESENT
VISUAL DISTURBANCES: NOT PRESENT
TACTILE DISTURBANCES: VERY MILD ITCHING, PINS AND NEEDLES BURNING OR NUMBNESS
AUDITORY DISTURBANCES: NOT PRESENT

## 2025-05-22 ASSESSMENT — PAIN SCALES - GENERAL
PAINLEVEL_OUTOF10: 5
PAINLEVEL_OUTOF10: 9
PAINLEVEL_OUTOF10: 7
PAINLEVEL_OUTOF10: 7
PAINLEVEL_OUTOF10: 3

## 2025-05-23 LAB
ABO + RH BLD: NORMAL
ACANTHOCYTES BLD QL SMEAR: ABNORMAL
ALBUMIN SERPL-MCNC: 1.8 G/DL (ref 3.4–5)
ALBUMIN/GLOB SERPL: 0.6 {RATIO} (ref 1–2.4)
ALP SERPL-CCNC: 163 UNITS/L (ref 45–117)
ALT SERPL-CCNC: 59 UNITS/L
AMMONIA PLAS-SCNC: 58 MCMOL/L
ANION GAP SERPL CALC-SCNC: 7 MMOL/L (ref 7–19)
AST SERPL-CCNC: 149 UNITS/L
BASOPHILS # BLD: 0 K/MCL (ref 0–0.3)
BASOPHILS NFR BLD: 1 %
BILIRUB SERPL-MCNC: 15.8 MG/DL (ref 0.2–1)
BLD GP AB SCN SERPL QL GEL: NEGATIVE
BLOOD EXPIRATION DATE: NORMAL
BUN SERPL-MCNC: 13 MG/DL (ref 6–20)
BUN/CREAT SERPL: 26 (ref 7–25)
BURR CELLS BLD QL SMEAR: ABNORMAL
CALCIUM SERPL-MCNC: 7.9 MG/DL (ref 8.4–10.2)
CHLORIDE SERPL-SCNC: 109 MMOL/L (ref 97–110)
CO2 SERPL-SCNC: 28 MMOL/L (ref 21–32)
CREAT SERPL-MCNC: 0.5 MG/DL (ref 0.51–0.95)
CROSSMATCH RESULT: NORMAL
DEPRECATED RDW RBC: 52.9 FL (ref 39–50)
DISPENSE STATUS: NORMAL
EGFRCR SERPLBLD CKD-EPI 2021: >90 ML/MIN/{1.73_M2}
EOSINOPHIL # BLD: 0 K/MCL (ref 0–0.5)
EOSINOPHIL NFR BLD: 1 %
ERYTHROCYTE [DISTWIDTH] IN BLOOD: 12.9 % (ref 11–15)
FASTING DURATION TIME PATIENT: ABNORMAL H
GLOBULIN SER-MCNC: 3.1 G/DL (ref 2–4)
GLUCOSE SERPL-MCNC: 97 MG/DL (ref 70–99)
HCT VFR BLD CALC: 19.4 % (ref 36–46.5)
HGB BLD-MCNC: 6.4 G/DL (ref 12–15.5)
HGB BLD-MCNC: 7.9 G/DL (ref 12–15.5)
ISBT BLOOD TYPE: 5100
ISSUE DATE/TIME: NORMAL
LYMPHOCYTES # BLD: 0.6 K/MCL (ref 1–4.8)
LYMPHOCYTES NFR BLD: 22 %
MAGNESIUM SERPL-MCNC: 1.8 MG/DL (ref 1.7–2.4)
MCH RBC QN AUTO: 37.6 PG (ref 26–34)
MCHC RBC AUTO-ENTMCNC: 33 G/DL (ref 32–36.5)
MCV RBC AUTO: 114.1 FL (ref 78–100)
MYELOCYTES # BLD MANUAL: 1 %
NEUTROPHILS # BLD: 2 K/MCL (ref 1.8–7.7)
NEUTS SEG NFR BLD: 75 %
NRBC BLD MANUAL-RTO: 0 /100 WBC
OVALOCYTES BLD QL SMEAR: ABNORMAL
PHOSPHATE SERPL-MCNC: 2.6 MG/DL (ref 2.4–4.7)
PLAT MORPH BLD: NORMAL
PLATELET # BLD AUTO: 37 K/MCL (ref 140–450)
POTASSIUM SERPL-SCNC: 4 MMOL/L (ref 3.4–5.1)
PRODUCT CODE: NORMAL
PRODUCT DESCRIPTION: NORMAL
PRODUCT ID: NORMAL
PROT SERPL-MCNC: 4.9 G/DL (ref 6.4–8.2)
RBC # BLD: 1.7 MIL/MCL (ref 4–5.2)
SODIUM SERPL-SCNC: 140 MMOL/L (ref 135–145)
TYPE AND SCREEN EXPIRATION DATE: NORMAL
UNIT BLOOD TYPE: NORMAL
UNIT NUMBER: NORMAL
WBC # BLD: 2.7 K/MCL (ref 4.2–11)
WBC MORPH BLD: NORMAL

## 2025-05-23 PROCEDURE — 10002803 HB RX 637: Performed by: INTERNAL MEDICINE

## 2025-05-23 PROCEDURE — 99233 SBSQ HOSP IP/OBS HIGH 50: CPT | Performed by: INTERNAL MEDICINE

## 2025-05-23 PROCEDURE — 99233 SBSQ HOSP IP/OBS HIGH 50: CPT

## 2025-05-23 PROCEDURE — 84100 ASSAY OF PHOSPHORUS: CPT | Performed by: INTERNAL MEDICINE

## 2025-05-23 PROCEDURE — 83735 ASSAY OF MAGNESIUM: CPT | Performed by: INTERNAL MEDICINE

## 2025-05-23 PROCEDURE — P9016 RBC LEUKOCYTES REDUCED: HCPCS

## 2025-05-23 PROCEDURE — 10002800 HB RX 250 W HCPCS: Performed by: INTERNAL MEDICINE

## 2025-05-23 PROCEDURE — 85018 HEMOGLOBIN: CPT | Performed by: INTERNAL MEDICINE

## 2025-05-23 PROCEDURE — 86850 RBC ANTIBODY SCREEN: CPT | Performed by: INTERNAL MEDICINE

## 2025-05-23 PROCEDURE — 82140 ASSAY OF AMMONIA: CPT | Performed by: INTERNAL MEDICINE

## 2025-05-23 PROCEDURE — 85027 COMPLETE CBC AUTOMATED: CPT | Performed by: INTERNAL MEDICINE

## 2025-05-23 PROCEDURE — 36415 COLL VENOUS BLD VENIPUNCTURE: CPT | Performed by: INTERNAL MEDICINE

## 2025-05-23 PROCEDURE — 10006031 HB ROOM CHARGE TELEMETRY

## 2025-05-23 PROCEDURE — 10002803 HB RX 637: Performed by: STUDENT IN AN ORGANIZED HEALTH CARE EDUCATION/TRAINING PROGRAM

## 2025-05-23 PROCEDURE — 80053 COMPREHEN METABOLIC PANEL: CPT | Performed by: INTERNAL MEDICINE

## 2025-05-23 PROCEDURE — 10004651 HB RX, NO CHARGE ITEM: Performed by: INTERNAL MEDICINE

## 2025-05-23 PROCEDURE — 10002803 HB RX 637: Performed by: FAMILY MEDICINE

## 2025-05-23 RX ORDER — LACTULOSE 10 G/15ML
10 SOLUTION ORAL 2 TIMES DAILY
Status: DISCONTINUED | OUTPATIENT
Start: 2025-05-23 | End: 2025-05-23

## 2025-05-23 RX ORDER — HALOPERIDOL 5 MG/ML
2 INJECTION INTRAMUSCULAR
Status: COMPLETED | OUTPATIENT
Start: 2025-05-23 | End: 2025-05-23

## 2025-05-23 RX ORDER — POTASSIUM CHLORIDE 1500 MG/1
40 TABLET, EXTENDED RELEASE ORAL ONCE
Status: COMPLETED | OUTPATIENT
Start: 2025-05-23 | End: 2025-05-23

## 2025-05-23 RX ORDER — SODIUM CHLORIDE 9 MG/ML
INJECTION, SOLUTION INTRAVENOUS CONTINUOUS PRN
Status: ACTIVE | OUTPATIENT
Start: 2025-05-23 | End: 2025-05-23

## 2025-05-23 RX ORDER — LACTULOSE 10 G/15ML
20 SOLUTION ORAL 2 TIMES DAILY
Status: DISCONTINUED | OUTPATIENT
Start: 2025-05-23 | End: 2025-05-27 | Stop reason: HOSPADM

## 2025-05-23 RX ADMIN — POTASSIUM CHLORIDE 40 MEQ: 1500 TABLET, EXTENDED RELEASE ORAL at 15:49

## 2025-05-23 RX ADMIN — RIFAXIMIN 550 MG: 550 TABLET ORAL at 21:07

## 2025-05-23 RX ADMIN — PROPRANOLOL HYDROCHLORIDE 20 MG: 20 TABLET ORAL at 21:07

## 2025-05-23 RX ADMIN — LORAZEPAM 2 MG: 2 TABLET ORAL at 02:54

## 2025-05-23 RX ADMIN — PROPRANOLOL HYDROCHLORIDE 20 MG: 20 TABLET ORAL at 15:49

## 2025-05-23 RX ADMIN — ONDANSETRON 4 MG: 2 INJECTION INTRAMUSCULAR; INTRAVENOUS at 17:57

## 2025-05-23 RX ADMIN — SODIUM CHLORIDE 10 ML: 9 INJECTION, SOLUTION INTRAMUSCULAR; INTRAVENOUS; SUBCUTANEOUS at 23:45

## 2025-05-23 RX ADMIN — SODIUM CHLORIDE, PRESERVATIVE FREE 2 ML: 5 INJECTION INTRAVENOUS at 21:08

## 2025-05-23 RX ADMIN — LORAZEPAM 2 MG: 2 INJECTION INTRAMUSCULAR; INTRAVENOUS at 21:30

## 2025-05-23 RX ADMIN — LORAZEPAM 2 MG: 2 TABLET ORAL at 18:14

## 2025-05-23 RX ADMIN — PROPRANOLOL HYDROCHLORIDE 20 MG: 20 TABLET ORAL at 11:10

## 2025-05-23 RX ADMIN — LORAZEPAM 4 MG: 2 INJECTION INTRAMUSCULAR; INTRAVENOUS at 23:44

## 2025-05-23 RX ADMIN — SODIUM CHLORIDE, PRESERVATIVE FREE 2 ML: 5 INJECTION INTRAVENOUS at 11:11

## 2025-05-23 RX ADMIN — IBUPROFEN 400 MG: 800 TABLET, FILM COATED ORAL at 11:10

## 2025-05-23 RX ADMIN — Medication 100 MG: at 11:10

## 2025-05-23 RX ADMIN — PANTOPRAZOLE SODIUM 40 MG: 40 TABLET, DELAYED RELEASE ORAL at 11:10

## 2025-05-23 RX ADMIN — LACTULOSE 20 G: 10 SOLUTION ORAL at 21:07

## 2025-05-23 RX ADMIN — HALOPERIDOL LACTATE 2 MG: 5 INJECTION, SOLUTION INTRAMUSCULAR at 23:44

## 2025-05-23 RX ADMIN — FOLIC ACID 1 MG: 1 TABLET ORAL at 11:10

## 2025-05-23 ASSESSMENT — LIFESTYLE VARIABLES
TREMOR: NO TREMOR
AUDITORY DISTURBANCES: NOT PRESENT
TREMOR: NO TREMOR
VISUAL DISTURBANCES: MILD SENSITIVITY
AUDITORY DISTURBANCES: NOT PRESENT
HEADACHE, FULLNESS IN HEAD: MODERATE
AGITATION: NORMAL ACTIVITY
AGITATION: NORMAL ACTIVITY
NAUSEA AND VOMITING: MILD NAUSEA WITH NO VOMITING
TREMOR: NO TREMOR
NAUSEA AND VOMITING: MILD NAUSEA WITH NO VOMITING
AGITATION: SOMEWHAT MORE THAN NORMAL ACTIVITY
PAROXYSMAL SWEATS: NO SWEAT VISIBLE
HEADACHE, FULLNESS IN HEAD: NOT PRESENT
ANXIETY: NO ANXIETY, AT EASE
HEADACHE, FULLNESS IN HEAD: NOT PRESENT
VISUAL DISTURBANCES: MILD SENSITIVITY
TREMOR: NO TREMOR
AUDITORY DISTURBANCES: NOT PRESENT
PAROXYSMAL SWEATS: NO SWEAT VISIBLE
PAROXYSMAL SWEATS: BARELY PERCEPTIBLE SWEATING, PALMS MOIST
TREMOR: NO TREMOR
TREMOR: NO TREMOR
TACTILE DISTURBANCES: VERY MILD ITCHING, PINS AND NEEDLES BURNING OR NUMBNESS
ANXIETY: MILDLY ANXIOUS
VISUAL DISTURBANCES: NOT PRESENT
PAROXYSMAL SWEATS: NO SWEAT VISIBLE
AUDITORY DISTURBANCES: NOT PRESENT
HEADACHE, FULLNESS IN HEAD: MODERATE
HEADACHE, FULLNESS IN HEAD: MILD
ANXIETY: MILDLY ANXIOUS
TREMOR: NO TREMOR
ANXIETY: 2
PAROXYSMAL SWEATS: NO SWEAT VISIBLE
NAUSEA AND VOMITING: 3
VISUAL DISTURBANCES: NOT PRESENT
HEADACHE, FULLNESS IN HEAD: VERY MILD
VISUAL DISTURBANCES: NOT PRESENT
ANXIETY: MILDLY ANXIOUS
TACTILE DISTURBANCES: VERY MILD ITCHING, PINS AND NEEDLES BURNING OR NUMBNESS
AGITATION: NORMAL ACTIVITY
AGITATION: NORMAL ACTIVITY
TREMOR: NOT VISIBLE, BUT CAN BE FELT FINGERTIP TO FINGERTIP
AUDITORY DISTURBANCES: NOT PRESENT
NAUSEA AND VOMITING: MILD NAUSEA WITH NO VOMITING
AGITATION: NORMAL ACTIVITY
VISUAL DISTURBANCES: NOT PRESENT
TREMOR: NO TREMOR
AUDITORY DISTURBANCES: NOT PRESENT
ANXIETY: NO ANXIETY, AT EASE
VISUAL DISTURBANCES: NOT PRESENT
HEADACHE, FULLNESS IN HEAD: MODERATELY SEVERE
AUDITORY DISTURBANCES: NOT PRESENT
AGITATION: NORMAL ACTIVITY
TACTILE DISTURBANCES: VERY MILD ITCHING, PINS AND NEEDLES BURNING OR NUMBNESS
ANXIETY: MILDLY ANXIOUS
ANXIETY: 2
ANXIETY: MILDLY ANXIOUS
PAROXYSMAL SWEATS: NO SWEAT VISIBLE
NAUSEA AND VOMITING: MILD NAUSEA WITH NO VOMITING
NAUSEA AND VOMITING: MILD NAUSEA WITH NO VOMITING
PAROXYSMAL SWEATS: NO SWEAT VISIBLE
PAROXYSMAL SWEATS: NO SWEAT VISIBLE
AUDITORY DISTURBANCES: NOT PRESENT
HEADACHE, FULLNESS IN HEAD: MILD
NAUSEA AND VOMITING: MILD NAUSEA WITH NO VOMITING
AGITATION: NORMAL ACTIVITY
AUDITORY DISTURBANCES: NOT PRESENT
PAROXYSMAL SWEATS: NO SWEAT VISIBLE
HEADACHE, FULLNESS IN HEAD: MODERATE
AUDITORY DISTURBANCES: NOT PRESENT
ANXIETY: MILDLY ANXIOUS
HEADACHE, FULLNESS IN HEAD: MODERATE
PAROXYSMAL SWEATS: NO SWEAT VISIBLE
AGITATION: NORMAL ACTIVITY
NAUSEA AND VOMITING: 2
VISUAL DISTURBANCES: NOT PRESENT
HEADACHE, FULLNESS IN HEAD: MODERATE
TACTILE DISTURBANCES: MODERATE ITCHING, PINS AND NEEDLES, BURNING OR NUMBNESS
AGITATION: NORMAL ACTIVITY
TREMOR: NO TREMOR
AUDITORY DISTURBANCES: NOT PRESENT
AGITATION: NORMAL ACTIVITY
NAUSEA AND VOMITING: MILD NAUSEA WITH NO VOMITING
AGITATION: NORMAL ACTIVITY
PAROXYSMAL SWEATS: NO SWEAT VISIBLE
VISUAL DISTURBANCES: NOT PRESENT
TREMOR: NO TREMOR
PAROXYSMAL SWEATS: NO SWEAT VISIBLE
HEADACHE, FULLNESS IN HEAD: MILD
NAUSEA AND VOMITING: 3
ANXIETY: MILDLY ANXIOUS
VISUAL DISTURBANCES: NOT PRESENT
VISUAL DISTURBANCES: NOT PRESENT
TACTILE DISTURBANCES: SEVERE HALLUCINATIONS
NAUSEA AND VOMITING: MILD NAUSEA WITH NO VOMITING
TREMOR: NOT VISIBLE, BUT CAN BE FELT FINGERTIP TO FINGERTIP
ANXIETY: MILDLY ANXIOUS
AUDITORY DISTURBANCES: NOT PRESENT
VISUAL DISTURBANCES: NOT PRESENT
TACTILE DISTURBANCES: MILD ITCHING, PINS AND NEEDLES, BURNING OR NUMBNESS
NAUSEA AND VOMITING: 2

## 2025-05-23 ASSESSMENT — PAIN SCALES - GENERAL
PAINLEVEL_OUTOF10: 1
PAINLEVEL_OUTOF10: 2
PAINLEVEL_OUTOF10: 2

## 2025-05-24 ENCOUNTER — APPOINTMENT (OUTPATIENT)
Dept: GENERAL RADIOLOGY | Age: 30
DRG: 053 | End: 2025-05-24
Attending: INTERNAL MEDICINE

## 2025-05-24 ENCOUNTER — APPOINTMENT (OUTPATIENT)
Dept: CT IMAGING | Age: 30
DRG: 053 | End: 2025-05-24
Attending: INTERNAL MEDICINE

## 2025-05-24 LAB
ALBUMIN SERPL-MCNC: 1.8 G/DL (ref 3.4–5)
ALBUMIN/GLOB SERPL: 0.5 {RATIO} (ref 1–2.4)
ALP SERPL-CCNC: 163 UNITS/L (ref 45–117)
ALT SERPL-CCNC: 52 UNITS/L
AMMONIA PLAS-SCNC: <10 MCMOL/L
ANION GAP SERPL CALC-SCNC: 7 MMOL/L (ref 7–19)
AST SERPL-CCNC: 109 UNITS/L
BASOPHILS # BLD: 0 K/MCL (ref 0–0.3)
BASOPHILS NFR BLD: 0 %
BILIRUB SERPL-MCNC: 16.8 MG/DL (ref 0.2–1)
BUN SERPL-MCNC: 18 MG/DL (ref 6–20)
BUN/CREAT SERPL: 28 (ref 7–25)
CALCIUM SERPL-MCNC: 8.1 MG/DL (ref 8.4–10.2)
CHLORIDE SERPL-SCNC: 109 MMOL/L (ref 97–110)
CO2 SERPL-SCNC: 26 MMOL/L (ref 21–32)
CREAT SERPL-MCNC: 0.65 MG/DL (ref 0.51–0.95)
DEPRECATED RDW RBC: 64.5 FL (ref 39–50)
EGFRCR SERPLBLD CKD-EPI 2021: >90 ML/MIN/{1.73_M2}
EOSINOPHIL # BLD: 0.1 K/MCL (ref 0–0.5)
EOSINOPHIL NFR BLD: 1 %
ERYTHROCYTE [DISTWIDTH] IN BLOOD: 18.3 % (ref 11–15)
FASTING DURATION TIME PATIENT: ABNORMAL H
GLOBULIN SER-MCNC: 3.3 G/DL (ref 2–4)
GLUCOSE SERPL-MCNC: 102 MG/DL (ref 70–99)
HCT VFR BLD CALC: 23 % (ref 36–46.5)
HGB BLD-MCNC: 7.9 G/DL (ref 12–15.5)
IMM GRANULOCYTES # BLD AUTO: 0 K/MCL (ref 0–0.2)
IMM GRANULOCYTES # BLD: 1 %
INR PPP: 2.4
LYMPHOCYTES # BLD: 0.8 K/MCL (ref 1–4.8)
LYMPHOCYTES NFR BLD: 19 %
MAGNESIUM SERPL-MCNC: 1.5 MG/DL (ref 1.7–2.4)
MCH RBC QN AUTO: 37.4 PG (ref 26–34)
MCHC RBC AUTO-ENTMCNC: 34.3 G/DL (ref 32–36.5)
MCV RBC AUTO: 109 FL (ref 78–100)
MONOCYTES # BLD: 0.3 K/MCL (ref 0.3–0.9)
MONOCYTES NFR BLD: 7 %
NEUTROPHILS # BLD: 3.2 K/MCL (ref 1.8–7.7)
NEUTROPHILS NFR BLD: 72 %
NRBC BLD MANUAL-RTO: 0 /100 WBC
PHOSPHATE SERPL-MCNC: 2.3 MG/DL (ref 2.4–4.7)
PLATELET # BLD AUTO: 37 K/MCL (ref 140–450)
POTASSIUM SERPL-SCNC: 4.3 MMOL/L (ref 3.4–5.1)
PROT SERPL-MCNC: 5.1 G/DL (ref 6.4–8.2)
PROTHROMBIN TIME: 23.5 SEC (ref 9.7–11.8)
RBC # BLD: 2.11 MIL/MCL (ref 4–5.2)
SODIUM SERPL-SCNC: 138 MMOL/L (ref 135–145)
WBC # BLD: 4.3 K/MCL (ref 4.2–11)

## 2025-05-24 PROCEDURE — 71045 X-RAY EXAM CHEST 1 VIEW: CPT

## 2025-05-24 PROCEDURE — 10004651 HB RX, NO CHARGE ITEM: Performed by: INTERNAL MEDICINE

## 2025-05-24 PROCEDURE — 82140 ASSAY OF AMMONIA: CPT | Performed by: INTERNAL MEDICINE

## 2025-05-24 PROCEDURE — 80053 COMPREHEN METABOLIC PANEL: CPT | Performed by: INTERNAL MEDICINE

## 2025-05-24 PROCEDURE — 10002800 HB RX 250 W HCPCS: Performed by: PSYCHIATRY & NEUROLOGY

## 2025-05-24 PROCEDURE — 10002800 HB RX 250 W HCPCS: Performed by: INTERNAL MEDICINE

## 2025-05-24 PROCEDURE — 10002803 HB RX 637: Performed by: STUDENT IN AN ORGANIZED HEALTH CARE EDUCATION/TRAINING PROGRAM

## 2025-05-24 PROCEDURE — 70450 CT HEAD/BRAIN W/O DYE: CPT

## 2025-05-24 PROCEDURE — 85025 COMPLETE CBC W/AUTO DIFF WBC: CPT | Performed by: INTERNAL MEDICINE

## 2025-05-24 PROCEDURE — 36415 COLL VENOUS BLD VENIPUNCTURE: CPT | Performed by: INTERNAL MEDICINE

## 2025-05-24 PROCEDURE — 84100 ASSAY OF PHOSPHORUS: CPT

## 2025-05-24 PROCEDURE — 10002803 HB RX 637: Performed by: INTERNAL MEDICINE

## 2025-05-24 PROCEDURE — 10006031 HB ROOM CHARGE TELEMETRY

## 2025-05-24 PROCEDURE — 85610 PROTHROMBIN TIME: CPT | Performed by: INTERNAL MEDICINE

## 2025-05-24 PROCEDURE — 10002807 HB RX 258: Performed by: INTERNAL MEDICINE

## 2025-05-24 PROCEDURE — 99233 SBSQ HOSP IP/OBS HIGH 50: CPT | Performed by: STUDENT IN AN ORGANIZED HEALTH CARE EDUCATION/TRAINING PROGRAM

## 2025-05-24 PROCEDURE — 83735 ASSAY OF MAGNESIUM: CPT

## 2025-05-24 PROCEDURE — 99233 SBSQ HOSP IP/OBS HIGH 50: CPT | Performed by: INTERNAL MEDICINE

## 2025-05-24 PROCEDURE — 90792 PSYCH DIAG EVAL W/MED SRVCS: CPT | Performed by: PSYCHIATRY & NEUROLOGY

## 2025-05-24 PROCEDURE — 90839 PSYTX CRISIS INITIAL 60 MIN: CPT

## 2025-05-24 RX ORDER — HALOPERIDOL 5 MG/ML
2 INJECTION INTRAMUSCULAR EVERY 6 HOURS PRN
Status: DISCONTINUED | OUTPATIENT
Start: 2025-05-24 | End: 2025-05-27 | Stop reason: HOSPADM

## 2025-05-24 RX ORDER — LANOLIN ALCOHOL/MO/W.PET/CERES
400 CREAM (GRAM) TOPICAL ONCE
Status: COMPLETED | OUTPATIENT
Start: 2025-05-25 | End: 2025-05-25

## 2025-05-24 RX ORDER — LANOLIN ALCOHOL/MO/W.PET/CERES
400 CREAM (GRAM) TOPICAL ONCE
Status: COMPLETED | OUTPATIENT
Start: 2025-05-24 | End: 2025-05-24

## 2025-05-24 RX ADMIN — POTASSIUM & SODIUM PHOSPHATES POWDER PACK 280-160-250 MG 1 PACKET: 280-160-250 PACK at 22:47

## 2025-05-24 RX ADMIN — LORAZEPAM 3 MG: 2 INJECTION INTRAMUSCULAR; INTRAVENOUS at 22:19

## 2025-05-24 RX ADMIN — SODIUM CHLORIDE, PRESERVATIVE FREE 2 ML: 5 INJECTION INTRAVENOUS at 08:28

## 2025-05-24 RX ADMIN — PANTOPRAZOLE 40 MG: 40 TABLET, DELAYED RELEASE ORAL at 06:00

## 2025-05-24 RX ADMIN — SODIUM CHLORIDE, PRESERVATIVE FREE 2 ML: 5 INJECTION INTRAVENOUS at 21:52

## 2025-05-24 RX ADMIN — LACTULOSE 20 G: 10 SOLUTION ORAL at 08:27

## 2025-05-24 RX ADMIN — RIFAXIMIN 550 MG: 550 TABLET ORAL at 21:45

## 2025-05-24 RX ADMIN — PHYTONADIONE 5 MG: 10 INJECTION, EMULSION INTRAMUSCULAR; INTRAVENOUS; SUBCUTANEOUS at 13:03

## 2025-05-24 RX ADMIN — PROPRANOLOL HYDROCHLORIDE 20 MG: 20 TABLET ORAL at 06:00

## 2025-05-24 RX ADMIN — RIFAXIMIN 550 MG: 550 TABLET ORAL at 08:27

## 2025-05-24 RX ADMIN — PROPRANOLOL HYDROCHLORIDE 20 MG: 20 TABLET ORAL at 17:03

## 2025-05-24 RX ADMIN — PROPRANOLOL HYDROCHLORIDE 20 MG: 20 TABLET ORAL at 21:45

## 2025-05-24 RX ADMIN — POTASSIUM & SODIUM PHOSPHATES POWDER PACK 280-160-250 MG 1 PACKET: 280-160-250 PACK at 17:03

## 2025-05-24 RX ADMIN — MAGNESIUM OXIDE TAB 400 MG (241.3 MG ELEMENTAL MG) 400 MG: 400 (241.3 MG) TAB at 17:04

## 2025-05-24 RX ADMIN — HALOPERIDOL LACTATE 2 MG: 5 INJECTION, SOLUTION INTRAMUSCULAR at 20:20

## 2025-05-24 RX ADMIN — FOLIC ACID 1 MG: 1 TABLET ORAL at 08:28

## 2025-05-24 RX ADMIN — THIAMINE HYDROCHLORIDE 400 MG: 100 INJECTION, SOLUTION INTRAMUSCULAR; INTRAVENOUS at 13:03

## 2025-05-24 RX ADMIN — THIAMINE HYDROCHLORIDE 400 MG: 100 INJECTION, SOLUTION INTRAMUSCULAR; INTRAVENOUS at 21:45

## 2025-05-24 RX ADMIN — Medication 100 MG: at 08:28

## 2025-05-24 SDOH — SOCIAL STABILITY: SOCIAL INSECURITY: HOW OFTEN DOES ANYONE, INCLUDING FAMILY AND FRIENDS, INSULT OR TALK DOWN TO YOU?: NEVER

## 2025-05-24 SDOH — ECONOMIC STABILITY: HOUSING INSECURITY: DO YOU HAVE PROBLEMS WITH ANY OF THE FOLLOWING?: NONE OF THE ABOVE

## 2025-05-24 SDOH — ECONOMIC STABILITY: HOUSING INSECURITY: WHAT IS YOUR LIVING SITUATION TODAY?: I HAVE A STEADY PLACE TO LIVE

## 2025-05-24 SDOH — ECONOMIC STABILITY: FOOD INSECURITY: WITHIN THE PAST 12 MONTHS, THE FOOD YOU BOUGHT JUST DIDN'T LAST AND YOU DIDN'T HAVE MONEY TO GET MORE.: NEVER TRUE

## 2025-05-24 SDOH — SOCIAL STABILITY: SOCIAL NETWORK
HOW OFTEN DO YOU SEE OR TALK TO PEOPLE THAT YOU CARE ABOUT AND FEEL CLOSE TO? (FOR EXAMPLE: TALKING TO FRIENDS ON THE PHONE, VISITING FRIENDS OR FAMILY, GOING TO CHURCH OR CLUB MEETINGS): 1 OR 2 TIMES A WEEK

## 2025-05-24 SDOH — SOCIAL STABILITY: SOCIAL INSECURITY: HOW OFTEN DOES ANYONE, INCLUDING FAMILY AND FRIENDS, SCREAM OR CURSE AT YOU?: NEVER

## 2025-05-24 SDOH — ECONOMIC STABILITY: TRANSPORTATION INSECURITY
IN THE PAST 12 MONTHS, HAS LACK OF RELIABLE TRANSPORTATION KEPT YOU FROM MEDICAL APPOINTMENTS, MEETINGS, WORK OR FROM GETTING THINGS NEEDED FOR DAILY LIVING?: NO

## 2025-05-24 SDOH — ECONOMIC STABILITY: GENERAL

## 2025-05-24 SDOH — SOCIAL STABILITY: SOCIAL INSECURITY: HOW OFTEN DOES ANYONE, INCLUDING FAMILY AND FRIENDS, THREATEN YOU WITH HARM?: NEVER

## 2025-05-24 SDOH — SOCIAL STABILITY: SOCIAL INSECURITY: HOW OFTEN DOES ANYONE, INCLUDING FAMILY AND FRIENDS, PHYSICALLY HURT YOU?: NEVER

## 2025-05-24 ASSESSMENT — LIFESTYLE VARIABLES
AUDITORY DISTURBANCES: NOT PRESENT
PAROXYSMAL SWEATS: BARELY PERCEPTIBLE SWEATING, PALMS MOIST
ANXIETY: 3
NAUSEA AND VOMITING: NO NAUSEA AND NO VOMITING
AGITATION: MODERATELY FIDGETY AND RESTLESS
TREMOR: 2
PAROXYSMAL SWEATS: BARELY PERCEPTIBLE SWEATING, PALMS MOIST
TREMOR: NOT VISIBLE, BUT CAN BE FELT FINGERTIP TO FINGERTIP
ANXIETY: 3
HEADACHE, FULLNESS IN HEAD: NOT PRESENT
AGITATION: MODERATELY FIDGETY AND RESTLESS
VISUAL DISTURBANCES: NOT PRESENT
HEADACHE, FULLNESS IN HEAD: NOT PRESENT
VISUAL DISTURBANCES: NOT PRESENT
NAUSEA AND VOMITING: NO NAUSEA AND NO VOMITING
AUDITORY DISTURBANCES: NOT PRESENT

## 2025-05-24 ASSESSMENT — COGNITIVE AND FUNCTIONAL STATUS - GENERAL
MOTOR_BEHAVIOR-RETARDATION_CALCULATED: CALM AND PURPOSEFUL
LEVEL_OF_CONSCIOUSNESS_CALCULATED: ALERT
MEMORY: INTACT
AFFECT: APPROPRIATE TO SITUATION
BEHAVIOR: APPROPRIATE TO SITUATION
ATTENTION_CALCULATED: MAINTAINS ATTENTION
MOTOR_BEHAVIOR-AGITATION_CALCULATED: CALM AND PURPOSEFUL
ORIENTATION: ORIENTED TO PERSON;ORIENTED TO PLACE;ORIENTED TO TIME
SPEECH: NO SPEECH/VOCALIZATION (INTUBATED)
PERCEPTUAL_MISINTERPRETATIONS_HALLUCINATIONS: CLEAR REALITY BASED PERCEPTIONS
MOOD: UNREMARKABLE

## 2025-05-25 VITALS
BODY MASS INDEX: 25.86 KG/M2 | HEIGHT: 66 IN | SYSTOLIC BLOOD PRESSURE: 119 MMHG | DIASTOLIC BLOOD PRESSURE: 66 MMHG | WEIGHT: 160.94 LBS | RESPIRATION RATE: 16 BRPM | OXYGEN SATURATION: 95 % | TEMPERATURE: 99.5 F | HEART RATE: 92 BPM

## 2025-05-25 LAB
ALBUMIN SERPL-MCNC: 2 G/DL (ref 3.4–5)
ALBUMIN/GLOB SERPL: 0.6 {RATIO} (ref 1–2.4)
ALP SERPL-CCNC: 181 UNITS/L (ref 45–117)
ALT SERPL-CCNC: 47 UNITS/L
ANION GAP SERPL CALC-SCNC: 10 MMOL/L (ref 7–19)
AST SERPL-CCNC: 79 UNITS/L
BASOPHILS # BLD: 0 K/MCL (ref 0–0.3)
BASOPHILS NFR BLD: 0 %
BILIRUB SERPL-MCNC: 16.9 MG/DL (ref 0.2–1)
BUN SERPL-MCNC: 19 MG/DL (ref 6–20)
BUN/CREAT SERPL: 26 (ref 7–25)
CALCIUM SERPL-MCNC: 8.1 MG/DL (ref 8.4–10.2)
CHLORIDE SERPL-SCNC: 108 MMOL/L (ref 97–110)
CO2 SERPL-SCNC: 25 MMOL/L (ref 21–32)
CREAT SERPL-MCNC: 0.72 MG/DL (ref 0.51–0.95)
DEPRECATED RDW RBC: 66.7 FL (ref 39–50)
EGFRCR SERPLBLD CKD-EPI 2021: >90 ML/MIN/{1.73_M2}
EOSINOPHIL # BLD: 0 K/MCL (ref 0–0.5)
EOSINOPHIL NFR BLD: 0 %
ERYTHROCYTE [DISTWIDTH] IN BLOOD: 20.2 % (ref 11–15)
FASTING DURATION TIME PATIENT: ABNORMAL H
GLOBULIN SER-MCNC: 3.5 G/DL (ref 2–4)
GLUCOSE SERPL-MCNC: 125 MG/DL (ref 70–99)
HCT VFR BLD CALC: 24.9 % (ref 36–46.5)
HGB BLD-MCNC: 8.4 G/DL (ref 12–15.5)
IMM GRANULOCYTES # BLD AUTO: 0.1 K/MCL (ref 0–0.2)
IMM GRANULOCYTES # BLD: 1 %
INR PPP: 2.1
LYMPHOCYTES # BLD: 1.4 K/MCL (ref 1–4.8)
LYMPHOCYTES NFR BLD: 15 %
MAGNESIUM SERPL-MCNC: 1.7 MG/DL (ref 1.7–2.4)
MCH RBC QN AUTO: 37.3 PG (ref 26–34)
MCHC RBC AUTO-ENTMCNC: 33.7 G/DL (ref 32–36.5)
MCV RBC AUTO: 110.7 FL (ref 78–100)
MONOCYTES # BLD: 0.7 K/MCL (ref 0.3–0.9)
MONOCYTES NFR BLD: 7 %
NEUTROPHILS # BLD: 7.3 K/MCL (ref 1.8–7.7)
NEUTROPHILS NFR BLD: 77 %
NRBC BLD MANUAL-RTO: 0 /100 WBC
PHOSPHATE SERPL-MCNC: 2.7 MG/DL (ref 2.4–4.7)
PLATELET # BLD AUTO: 48 K/MCL (ref 140–450)
POTASSIUM SERPL-SCNC: 3.7 MMOL/L (ref 3.4–5.1)
PROT SERPL-MCNC: 5.5 G/DL (ref 6.4–8.2)
PROTHROMBIN TIME: 21.4 SEC (ref 9.7–11.8)
RBC # BLD: 2.25 MIL/MCL (ref 4–5.2)
SODIUM SERPL-SCNC: 139 MMOL/L (ref 135–145)
WBC # BLD: 9.5 K/MCL (ref 4.2–11)

## 2025-05-25 PROCEDURE — 85025 COMPLETE CBC W/AUTO DIFF WBC: CPT | Performed by: INTERNAL MEDICINE

## 2025-05-25 PROCEDURE — 10002800 HB RX 250 W HCPCS: Performed by: INTERNAL MEDICINE

## 2025-05-25 PROCEDURE — 10006031 HB ROOM CHARGE TELEMETRY

## 2025-05-25 PROCEDURE — 80053 COMPREHEN METABOLIC PANEL: CPT | Performed by: INTERNAL MEDICINE

## 2025-05-25 PROCEDURE — 10002803 HB RX 637: Performed by: INTERNAL MEDICINE

## 2025-05-25 PROCEDURE — 36415 COLL VENOUS BLD VENIPUNCTURE: CPT | Performed by: INTERNAL MEDICINE

## 2025-05-25 PROCEDURE — 84100 ASSAY OF PHOSPHORUS: CPT | Performed by: INTERNAL MEDICINE

## 2025-05-25 PROCEDURE — 10002803 HB RX 637: Performed by: STUDENT IN AN ORGANIZED HEALTH CARE EDUCATION/TRAINING PROGRAM

## 2025-05-25 PROCEDURE — 99233 SBSQ HOSP IP/OBS HIGH 50: CPT | Performed by: INTERNAL MEDICINE

## 2025-05-25 PROCEDURE — 10004651 HB RX, NO CHARGE ITEM: Performed by: INTERNAL MEDICINE

## 2025-05-25 PROCEDURE — 85610 PROTHROMBIN TIME: CPT | Performed by: INTERNAL MEDICINE

## 2025-05-25 PROCEDURE — 10002807 HB RX 258: Performed by: INTERNAL MEDICINE

## 2025-05-25 PROCEDURE — 83735 ASSAY OF MAGNESIUM: CPT | Performed by: INTERNAL MEDICINE

## 2025-05-25 PROCEDURE — 99233 SBSQ HOSP IP/OBS HIGH 50: CPT

## 2025-05-25 RX ORDER — POTASSIUM CHLORIDE 14.9 MG/ML
20 INJECTION INTRAVENOUS ONCE
Status: COMPLETED | OUTPATIENT
Start: 2025-05-25 | End: 2025-05-25

## 2025-05-25 RX ADMIN — PROPRANOLOL HYDROCHLORIDE 20 MG: 20 TABLET ORAL at 13:57

## 2025-05-25 RX ADMIN — PANTOPRAZOLE 40 MG: 40 TABLET, DELAYED RELEASE ORAL at 06:00

## 2025-05-25 RX ADMIN — SODIUM CHLORIDE, PRESERVATIVE FREE 2 ML: 5 INJECTION INTRAVENOUS at 22:19

## 2025-05-25 RX ADMIN — POTASSIUM CHLORIDE 20 MEQ: 14.9 INJECTION, SOLUTION INTRAVENOUS at 11:14

## 2025-05-25 RX ADMIN — THIAMINE HYDROCHLORIDE 400 MG: 100 INJECTION, SOLUTION INTRAMUSCULAR; INTRAVENOUS at 22:23

## 2025-05-25 RX ADMIN — PROPRANOLOL HYDROCHLORIDE 20 MG: 20 TABLET ORAL at 05:51

## 2025-05-25 RX ADMIN — PROPRANOLOL HYDROCHLORIDE 20 MG: 20 TABLET ORAL at 22:19

## 2025-05-25 RX ADMIN — RIFAXIMIN 550 MG: 550 TABLET ORAL at 22:19

## 2025-05-25 RX ADMIN — POTASSIUM CHLORIDE 20 MEQ: 14.9 INJECTION, SOLUTION INTRAVENOUS at 13:57

## 2025-05-25 RX ADMIN — THIAMINE HYDROCHLORIDE 400 MG: 100 INJECTION, SOLUTION INTRAMUSCULAR; INTRAVENOUS at 17:38

## 2025-05-25 RX ADMIN — MAGNESIUM SULFATE HEPTAHYDRATE 2 G: 40 INJECTION, SOLUTION INTRAVENOUS at 09:09

## 2025-05-25 RX ADMIN — THIAMINE HYDROCHLORIDE 400 MG: 100 INJECTION, SOLUTION INTRAMUSCULAR; INTRAVENOUS at 05:50

## 2025-05-25 RX ADMIN — MAGNESIUM OXIDE TAB 400 MG (241.3 MG ELEMENTAL MG) 400 MG: 400 (241.3 MG) TAB at 00:23

## 2025-05-25 ASSESSMENT — LIFESTYLE VARIABLES
AGITATION: NORMAL ACTIVITY
AUDITORY DISTURBANCES: NOT PRESENT
HEADACHE, FULLNESS IN HEAD: NOT PRESENT
NAUSEA AND VOMITING: NO NAUSEA AND NO VOMITING
AGITATION: NORMAL ACTIVITY
TREMOR: NO TREMOR
ANXIETY: NO ANXIETY, AT EASE
ANXIETY: NO ANXIETY, AT EASE
PAROXYSMAL SWEATS: BARELY PERCEPTIBLE SWEATING, PALMS MOIST
VISUAL DISTURBANCES: NOT PRESENT
ANXIETY: MILDLY ANXIOUS
TREMOR: NO TREMOR
AGITATION: NORMAL ACTIVITY
AUDITORY DISTURBANCES: NOT PRESENT
HEADACHE, FULLNESS IN HEAD: NOT PRESENT
VISUAL DISTURBANCES: NOT PRESENT
NAUSEA AND VOMITING: NO NAUSEA AND NO VOMITING
ANXIETY: NO ANXIETY, AT EASE
HEADACHE, FULLNESS IN HEAD: NOT PRESENT
VISUAL DISTURBANCES: NOT PRESENT
PAROXYSMAL SWEATS: NO SWEAT VISIBLE
HEADACHE, FULLNESS IN HEAD: NOT PRESENT
PAROXYSMAL SWEATS: NO SWEAT VISIBLE
NAUSEA AND VOMITING: NO NAUSEA AND NO VOMITING
AGITATION: NORMAL ACTIVITY
TREMOR: NO TREMOR
ANXIETY: NO ANXIETY, AT EASE
TREMOR: NO TREMOR
AUDITORY DISTURBANCES: NOT PRESENT
VISUAL DISTURBANCES: NOT PRESENT
PAROXYSMAL SWEATS: NO SWEAT VISIBLE
VISUAL DISTURBANCES: NOT PRESENT
PAROXYSMAL SWEATS: BARELY PERCEPTIBLE SWEATING, PALMS MOIST
NAUSEA AND VOMITING: NO NAUSEA AND NO VOMITING
NAUSEA AND VOMITING: NO NAUSEA AND NO VOMITING
AGITATION: NORMAL ACTIVITY
AUDITORY DISTURBANCES: NOT PRESENT
HEADACHE, FULLNESS IN HEAD: NOT PRESENT
TREMOR: NO TREMOR
AUDITORY DISTURBANCES: NOT PRESENT

## 2025-05-25 ASSESSMENT — PAIN SCALES - GENERAL
PAINLEVEL_OUTOF10: 0
PAINLEVEL_OUTOF10: 0

## 2025-05-26 LAB
ALBUMIN SERPL-MCNC: 1.9 G/DL (ref 3.4–5)
ALBUMIN/GLOB SERPL: 0.6 {RATIO} (ref 1–2.4)
ALP SERPL-CCNC: 239 UNITS/L (ref 45–117)
ALT SERPL-CCNC: 42 UNITS/L
ANION GAP SERPL CALC-SCNC: 6 MMOL/L (ref 7–19)
AST SERPL-CCNC: 62 UNITS/L
BILIRUB SERPL-MCNC: 12.8 MG/DL (ref 0.2–1)
BUN SERPL-MCNC: 13 MG/DL (ref 6–20)
BUN/CREAT SERPL: 25 (ref 7–25)
CALCIUM SERPL-MCNC: 7.7 MG/DL (ref 8.4–10.2)
CHLORIDE SERPL-SCNC: 110 MMOL/L (ref 97–110)
CO2 SERPL-SCNC: 27 MMOL/L (ref 21–32)
CREAT SERPL-MCNC: 0.51 MG/DL (ref 0.51–0.95)
DEPRECATED RDW RBC: 64 FL (ref 39–50)
EGFRCR SERPLBLD CKD-EPI 2021: >90 ML/MIN/{1.73_M2}
ERYTHROCYTE [DISTWIDTH] IN BLOOD: 20.8 % (ref 11–15)
FASTING DURATION TIME PATIENT: ABNORMAL H
GLOBULIN SER-MCNC: 3.3 G/DL (ref 2–4)
GLUCOSE SERPL-MCNC: 103 MG/DL (ref 70–99)
HCT VFR BLD CALC: 24.6 % (ref 36–46.5)
HGB BLD-MCNC: 8.2 G/DL (ref 12–15.5)
INR PPP: 2.1
MAGNESIUM SERPL-MCNC: 1.7 MG/DL (ref 1.7–2.4)
MCH RBC QN AUTO: 37.3 PG (ref 26–34)
MCHC RBC AUTO-ENTMCNC: 33.3 G/DL (ref 32–36.5)
MCV RBC AUTO: 111.8 FL (ref 78–100)
NRBC BLD MANUAL-RTO: 1 /100 WBC
PLATELET # BLD AUTO: 41 K/MCL (ref 140–450)
POTASSIUM SERPL-SCNC: 3.7 MMOL/L (ref 3.4–5.1)
PROT SERPL-MCNC: 5.2 G/DL (ref 6.4–8.2)
PROTHROMBIN TIME: 21 SEC (ref 9.7–11.8)
RBC # BLD: 2.2 MIL/MCL (ref 4–5.2)
SODIUM SERPL-SCNC: 139 MMOL/L (ref 135–145)
WBC # BLD: 6.1 K/MCL (ref 4.2–11)

## 2025-05-26 PROCEDURE — 10002800 HB RX 250 W HCPCS: Performed by: INTERNAL MEDICINE

## 2025-05-26 PROCEDURE — 10004651 HB RX, NO CHARGE ITEM: Performed by: INTERNAL MEDICINE

## 2025-05-26 PROCEDURE — 10006031 HB ROOM CHARGE TELEMETRY

## 2025-05-26 PROCEDURE — 99233 SBSQ HOSP IP/OBS HIGH 50: CPT | Performed by: INTERNAL MEDICINE

## 2025-05-26 PROCEDURE — 97535 SELF CARE MNGMENT TRAINING: CPT

## 2025-05-26 PROCEDURE — 10002803 HB RX 637: Performed by: STUDENT IN AN ORGANIZED HEALTH CARE EDUCATION/TRAINING PROGRAM

## 2025-05-26 PROCEDURE — 10002803 HB RX 637: Performed by: FAMILY MEDICINE

## 2025-05-26 PROCEDURE — 85610 PROTHROMBIN TIME: CPT | Performed by: INTERNAL MEDICINE

## 2025-05-26 PROCEDURE — 97116 GAIT TRAINING THERAPY: CPT

## 2025-05-26 PROCEDURE — 83735 ASSAY OF MAGNESIUM: CPT | Performed by: INTERNAL MEDICINE

## 2025-05-26 PROCEDURE — 10002807 HB RX 258: Performed by: INTERNAL MEDICINE

## 2025-05-26 PROCEDURE — 97162 PT EVAL MOD COMPLEX 30 MIN: CPT

## 2025-05-26 PROCEDURE — 85027 COMPLETE CBC AUTOMATED: CPT | Performed by: INTERNAL MEDICINE

## 2025-05-26 PROCEDURE — 10002803 HB RX 637: Performed by: INTERNAL MEDICINE

## 2025-05-26 PROCEDURE — 36415 COLL VENOUS BLD VENIPUNCTURE: CPT | Performed by: INTERNAL MEDICINE

## 2025-05-26 PROCEDURE — 97166 OT EVAL MOD COMPLEX 45 MIN: CPT

## 2025-05-26 PROCEDURE — 97165 OT EVAL LOW COMPLEX 30 MIN: CPT

## 2025-05-26 PROCEDURE — 80053 COMPREHEN METABOLIC PANEL: CPT | Performed by: INTERNAL MEDICINE

## 2025-05-26 RX ORDER — LANOLIN ALCOHOL/MO/W.PET/CERES
400 CREAM (GRAM) TOPICAL ONCE
Status: COMPLETED | OUTPATIENT
Start: 2025-05-26 | End: 2025-05-26

## 2025-05-26 RX ORDER — LANOLIN ALCOHOL/MO/W.PET/CERES
400 CREAM (GRAM) TOPICAL ONCE
Status: DISCONTINUED | OUTPATIENT
Start: 2025-05-26 | End: 2025-05-27 | Stop reason: HOSPADM

## 2025-05-26 RX ORDER — PANTOPRAZOLE SODIUM 40 MG/1
40 TABLET, DELAYED RELEASE ORAL
Status: DISCONTINUED | OUTPATIENT
Start: 2025-05-27 | End: 2025-05-27 | Stop reason: HOSPADM

## 2025-05-26 RX ORDER — POTASSIUM CHLORIDE 1500 MG/1
40 TABLET, EXTENDED RELEASE ORAL ONCE
Status: COMPLETED | OUTPATIENT
Start: 2025-05-26 | End: 2025-05-26

## 2025-05-26 RX ORDER — NICOTINE 21 MG/24HR
1 PATCH, TRANSDERMAL 24 HOURS TRANSDERMAL DAILY
Status: DISCONTINUED | OUTPATIENT
Start: 2025-05-26 | End: 2025-05-27 | Stop reason: HOSPADM

## 2025-05-26 RX ADMIN — SODIUM CHLORIDE, PRESERVATIVE FREE 2 ML: 5 INJECTION INTRAVENOUS at 22:10

## 2025-05-26 RX ADMIN — PROPRANOLOL HYDROCHLORIDE 20 MG: 20 TABLET ORAL at 05:32

## 2025-05-26 RX ADMIN — RIFAXIMIN 550 MG: 550 TABLET ORAL at 22:06

## 2025-05-26 RX ADMIN — NICOTINE 1 PATCH: 21 PATCH, EXTENDED RELEASE TRANSDERMAL at 22:10

## 2025-05-26 RX ADMIN — PANTOPRAZOLE 40 MG: 40 TABLET, DELAYED RELEASE ORAL at 05:32

## 2025-05-26 RX ADMIN — RIFAXIMIN 550 MG: 550 TABLET ORAL at 08:44

## 2025-05-26 RX ADMIN — FOLIC ACID 1 MG: 1 TABLET ORAL at 08:45

## 2025-05-26 RX ADMIN — POTASSIUM CHLORIDE 40 MEQ: 1500 TABLET, EXTENDED RELEASE ORAL at 08:45

## 2025-05-26 RX ADMIN — THIAMINE HYDROCHLORIDE 400 MG: 100 INJECTION, SOLUTION INTRAMUSCULAR; INTRAVENOUS at 05:37

## 2025-05-26 RX ADMIN — Medication 400 MG: at 08:44

## 2025-05-26 RX ADMIN — PROPRANOLOL HYDROCHLORIDE 20 MG: 20 TABLET ORAL at 22:06

## 2025-05-26 RX ADMIN — IBUPROFEN 400 MG: 800 TABLET, FILM COATED ORAL at 08:57

## 2025-05-26 RX ADMIN — LACTULOSE 20 G: 10 SOLUTION ORAL at 22:06

## 2025-05-26 RX ADMIN — ONDANSETRON 4 MG: 2 INJECTION INTRAMUSCULAR; INTRAVENOUS at 13:31

## 2025-05-26 ASSESSMENT — PAIN SCALES - GENERAL
PAINLEVEL_OUTOF10: 5
PAINLEVEL_OUTOF10: 5
PAINLEVEL_OUTOF10: 0
PAINLEVEL_OUTOF10: 2

## 2025-05-26 ASSESSMENT — COGNITIVE AND FUNCTIONAL STATUS - GENERAL
DAILY_ACTIVITY_CONVERTED_SCORE: 47.10
DAILY_ACTIVITY_RAW_SCORE: 22
HELP NEEDED FOR BATHING: A LITTLE
BASIC_MOBILITY_RAW_SCORE: 23
BASIC_MOBILITY_CONVERTED_SCORE: 50.88
HELP NEEDED FOR TOILETING: A LITTLE

## 2025-05-26 ASSESSMENT — PAIN SCALES - WONG BAKER
WONGBAKER_NUMERICALRESPONSE: 1
WONGBAKER_NUMERICALRESPONSE: 2

## 2025-05-26 ASSESSMENT — ACTIVITIES OF DAILY LIVING (ADL)
PRIOR_ADL: INDEPENDENT
HOME_MANAGEMENT_TIME_ENTRY: 10

## 2025-05-27 VITALS
HEART RATE: 81 BPM | RESPIRATION RATE: 16 BRPM | BODY MASS INDEX: 26.79 KG/M2 | OXYGEN SATURATION: 96 % | SYSTOLIC BLOOD PRESSURE: 95 MMHG | TEMPERATURE: 97.7 F | HEIGHT: 66 IN | WEIGHT: 166.67 LBS | DIASTOLIC BLOOD PRESSURE: 53 MMHG

## 2025-05-27 LAB
ALBUMIN SERPL-MCNC: 1.7 G/DL (ref 3.4–5)
ALBUMIN/GLOB SERPL: 0.5 {RATIO} (ref 1–2.4)
ALP SERPL-CCNC: 249 UNITS/L (ref 45–117)
ALT SERPL-CCNC: 43 UNITS/L
AMMONIA PLAS-SCNC: <10 MCMOL/L
ANION GAP SERPL CALC-SCNC: 9 MMOL/L (ref 7–19)
AST SERPL-CCNC: 67 UNITS/L
BASOPHILS # BLD: 0 K/MCL (ref 0–0.3)
BASOPHILS NFR BLD: 1 %
BILIRUB SERPL-MCNC: 10.5 MG/DL (ref 0.2–1)
BUN SERPL-MCNC: 11 MG/DL (ref 6–20)
BUN/CREAT SERPL: 21 (ref 7–25)
CALCIUM SERPL-MCNC: 8.1 MG/DL (ref 8.4–10.2)
CHLORIDE SERPL-SCNC: 109 MMOL/L (ref 97–110)
CO2 SERPL-SCNC: 26 MMOL/L (ref 21–32)
CREAT SERPL-MCNC: 0.53 MG/DL (ref 0.51–0.95)
DEPRECATED RDW RBC: 68.1 FL (ref 39–50)
EGFRCR SERPLBLD CKD-EPI 2021: >90 ML/MIN/{1.73_M2}
EOSINOPHIL # BLD: 0.2 K/MCL (ref 0–0.5)
EOSINOPHIL NFR BLD: 5 %
ERYTHROCYTE [DISTWIDTH] IN BLOOD: 21.9 % (ref 11–15)
FASTING DURATION TIME PATIENT: ABNORMAL H
GLOBULIN SER-MCNC: 3.3 G/DL (ref 2–4)
GLUCOSE SERPL-MCNC: 118 MG/DL (ref 70–99)
HCT VFR BLD CALC: 26.4 % (ref 36–46.5)
HGB BLD-MCNC: 8.8 G/DL (ref 12–15.5)
IMM GRANULOCYTES # BLD AUTO: 0.1 K/MCL (ref 0–0.2)
IMM GRANULOCYTES # BLD: 2 %
LYMPHOCYTES # BLD: 1.2 K/MCL (ref 1–4.8)
LYMPHOCYTES NFR BLD: 30 %
MAGNESIUM SERPL-MCNC: 1.6 MG/DL (ref 1.7–2.4)
MCH RBC QN AUTO: 37.3 PG (ref 26–34)
MCHC RBC AUTO-ENTMCNC: 33.3 G/DL (ref 32–36.5)
MCV RBC AUTO: 111.9 FL (ref 78–100)
MONOCYTES # BLD: 0.5 K/MCL (ref 0.3–0.9)
MONOCYTES NFR BLD: 13 %
NEUTROPHILS # BLD: 1.9 K/MCL (ref 1.8–7.7)
NEUTROPHILS NFR BLD: 49 %
NRBC BLD MANUAL-RTO: 1 /100 WBC
PLATELET # BLD AUTO: 44 K/MCL (ref 140–450)
POTASSIUM SERPL-SCNC: 3.7 MMOL/L (ref 3.4–5.1)
PROT SERPL-MCNC: 5 G/DL (ref 6.4–8.2)
RBC # BLD: 2.36 MIL/MCL (ref 4–5.2)
SODIUM SERPL-SCNC: 140 MMOL/L (ref 135–145)
WBC # BLD: 3.9 K/MCL (ref 4.2–11)

## 2025-05-27 PROCEDURE — 10002803 HB RX 637: Performed by: INTERNAL MEDICINE

## 2025-05-27 PROCEDURE — 10004651 HB RX, NO CHARGE ITEM: Performed by: INTERNAL MEDICINE

## 2025-05-27 PROCEDURE — 10002803 HB RX 637: Performed by: STUDENT IN AN ORGANIZED HEALTH CARE EDUCATION/TRAINING PROGRAM

## 2025-05-27 PROCEDURE — 10002800 HB RX 250 W HCPCS: Performed by: INTERNAL MEDICINE

## 2025-05-27 PROCEDURE — 80053 COMPREHEN METABOLIC PANEL: CPT | Performed by: INTERNAL MEDICINE

## 2025-05-27 PROCEDURE — 36415 COLL VENOUS BLD VENIPUNCTURE: CPT | Performed by: INTERNAL MEDICINE

## 2025-05-27 PROCEDURE — 85025 COMPLETE CBC W/AUTO DIFF WBC: CPT | Performed by: INTERNAL MEDICINE

## 2025-05-27 PROCEDURE — 99233 SBSQ HOSP IP/OBS HIGH 50: CPT | Performed by: INTERNAL MEDICINE

## 2025-05-27 PROCEDURE — 82140 ASSAY OF AMMONIA: CPT | Performed by: INTERNAL MEDICINE

## 2025-05-27 PROCEDURE — 83735 ASSAY OF MAGNESIUM: CPT | Performed by: INTERNAL MEDICINE

## 2025-05-27 RX ORDER — FUROSEMIDE 40 MG/1
40 TABLET ORAL DAILY
Status: SHIPPED | COMMUNITY
Start: 2025-05-27

## 2025-05-27 RX ORDER — PROPRANOLOL HCL 20 MG
20 TABLET ORAL EVERY 8 HOURS SCHEDULED
Qty: 90 TABLET | Refills: 0 | Status: SHIPPED | OUTPATIENT
Start: 2025-05-27 | End: 2025-06-26

## 2025-05-27 RX ORDER — POTASSIUM CHLORIDE 1500 MG/1
40 TABLET, EXTENDED RELEASE ORAL ONCE
Status: DISCONTINUED | OUTPATIENT
Start: 2025-05-27 | End: 2025-05-27 | Stop reason: HOSPADM

## 2025-05-27 RX ORDER — SPIRONOLACTONE 100 MG/1
100 TABLET, FILM COATED ORAL DAILY
Status: SHIPPED | INPATIENT
Start: 2025-05-27

## 2025-05-27 RX ADMIN — LACTULOSE 20 G: 10 SOLUTION ORAL at 09:29

## 2025-05-27 RX ADMIN — NICOTINE 1 PATCH: 21 PATCH, EXTENDED RELEASE TRANSDERMAL at 09:29

## 2025-05-27 RX ADMIN — FOLIC ACID 1 MG: 1 TABLET ORAL at 09:29

## 2025-05-27 RX ADMIN — RIFAXIMIN 550 MG: 550 TABLET ORAL at 09:29

## 2025-05-27 RX ADMIN — PANTOPRAZOLE SODIUM 40 MG: 40 TABLET, DELAYED RELEASE ORAL at 06:15

## 2025-05-27 RX ADMIN — ONDANSETRON 4 MG: 2 INJECTION INTRAMUSCULAR; INTRAVENOUS at 08:12

## 2025-05-27 RX ADMIN — SODIUM CHLORIDE, PRESERVATIVE FREE 2 ML: 5 INJECTION INTRAVENOUS at 09:33

## 2025-05-27 RX ADMIN — PROPRANOLOL HYDROCHLORIDE 20 MG: 20 TABLET ORAL at 06:19

## 2025-05-27 ASSESSMENT — PAIN SCALES - GENERAL: PAINLEVEL_OUTOF10: 0

## 2025-05-29 ENCOUNTER — TELEPHONE (OUTPATIENT)
Dept: CARE COORDINATION | Age: 30
End: 2025-05-29

## 2025-05-30 ENCOUNTER — CASE MANAGEMENT (OUTPATIENT)
Dept: CARE COORDINATION | Age: 30
End: 2025-05-30

## 2025-06-05 ENCOUNTER — TELEPHONE (OUTPATIENT)
Dept: CARE COORDINATION | Age: 30
End: 2025-06-05

## 2025-06-06 ENCOUNTER — TELEPHONE (OUTPATIENT)
Dept: CARE COORDINATION | Age: 30
End: 2025-06-06

## 2025-06-12 ENCOUNTER — TELEPHONE (OUTPATIENT)
Dept: CARE COORDINATION | Age: 30
End: 2025-06-12

## 2025-06-13 ENCOUNTER — CASE MANAGEMENT (OUTPATIENT)
Dept: CARE COORDINATION | Age: 30
End: 2025-06-13

## 2025-06-13 DIAGNOSIS — K70.40 ALCOHOLIC LIVER FAILURE  (CMD): Primary | ICD-10-CM

## 2025-06-18 ENCOUNTER — CASE MANAGEMENT (OUTPATIENT)
Dept: CARE COORDINATION | Age: 30
End: 2025-06-18

## 2025-06-19 ENCOUNTER — TELEPHONE (OUTPATIENT)
Dept: CARE COORDINATION | Age: 30
End: 2025-06-19

## 2025-06-19 SDOH — ECONOMIC STABILITY: HOUSING INSECURITY: WHAT IS YOUR LIVING SITUATION TODAY?: PRIVATE RESIDENCE

## 2025-06-19 SDOH — ECONOMIC STABILITY: HOUSING INSECURITY: DO YOU HAVE STABLE HOUSING?: STABLE

## 2025-06-19 SDOH — ECONOMIC STABILITY: FOOD INSECURITY: FOOD INSECURITY: NO

## 2025-06-20 ENCOUNTER — CASE MANAGEMENT (OUTPATIENT)
Dept: CARE COORDINATION | Age: 30
End: 2025-06-20

## 2025-06-26 ENCOUNTER — TELEPHONE (OUTPATIENT)
Dept: CARE COORDINATION | Age: 30
End: 2025-06-26

## 2025-07-21 ENCOUNTER — APPOINTMENT (OUTPATIENT)
Dept: OCCUPATIONAL MEDICINE | Age: 30
End: 2025-07-21

## 2025-07-22 ENCOUNTER — APPOINTMENT (OUTPATIENT)
Dept: OCCUPATIONAL MEDICINE | Age: 30
End: 2025-07-22

## (undated) DEVICE — SUCTION MANIFOLD NEPTUNE 2 SYS 4 PORT 0702-020-000

## (undated) DEVICE — LINEN HALF SHEET 5512

## (undated) DEVICE — PAD CHUX UNDERPAD 30X36" P3036C

## (undated) DEVICE — SUCTION CANISTER MEDIVAC LINER 3000ML W/LID 65651-530

## (undated) DEVICE — KIT PROCEDURE W/CLEAN-A-SCOPE LINERS V2 200800

## (undated) DEVICE — GOWN XLG DISP 9545

## (undated) DEVICE — SOL WATER IRRIG 1000ML BOTTLE 2F7114

## (undated) DEVICE — ENDO LIGATOR UNIV 6 BAND G31917 MBL-U-6

## (undated) DEVICE — TUBING SUCTION 6"X3/16" N56A

## (undated) DEVICE — LINEN FULL SHEET 5511

## (undated) RX ORDER — ONDANSETRON 2 MG/ML
INJECTION INTRAMUSCULAR; INTRAVENOUS
Status: DISPENSED
Start: 2023-08-11

## (undated) RX ORDER — SIMETHICONE 40MG/0.6ML
SUSPENSION, DROPS(FINAL DOSAGE FORM)(ML) ORAL
Status: DISPENSED
Start: 2023-08-11

## (undated) RX ORDER — FENTANYL CITRATE 50 UG/ML
INJECTION, SOLUTION INTRAMUSCULAR; INTRAVENOUS
Status: DISPENSED
Start: 2023-08-11

## (undated) RX ORDER — DEXTROSE MONOHYDRATE 25 G/50ML
INJECTION, SOLUTION INTRAVENOUS
Status: DISPENSED
Start: 2023-08-11

## (undated) NOTE — ED AVS SNAPSHOT
[facility] in [place] [address]    Phone:  [phone]    Fax:  [place]   MRN: [id number]    Department:  [facility] in [facility]   Date of Visit:  2 It is our goal to assure that you are completely satisfied with every aspect of your visit today.   In an effort to constantly improve our service to you, we would appreciate any positive or negative feedback related to the care you received in our [employer] account. You may have had testing done that requires us to contact you. Please make sure we have your correct phone number on file.      OUR CURRENT HOURS OF OPERATION:  MONDAY THROUGH FRIDAY 8AM - 8PM  WEEKENDS AND HOLIDAYS 8AM - 6PM    I certifi Sign up for [employer], your secure online medical record. [employer] will allow you to access patient instructions from your recent visit,  view other health information, and more. To sign up or find more information, go to [email or web address]